# Patient Record
Sex: MALE | Race: OTHER | NOT HISPANIC OR LATINO | Employment: FULL TIME | URBAN - METROPOLITAN AREA
[De-identification: names, ages, dates, MRNs, and addresses within clinical notes are randomized per-mention and may not be internally consistent; named-entity substitution may affect disease eponyms.]

---

## 2008-10-22 LAB — EXTERNAL HIV SCREEN: NORMAL

## 2018-10-29 ENCOUNTER — HOSPITAL ENCOUNTER (EMERGENCY)
Facility: HOSPITAL | Age: 48
Discharge: HOME | End: 2018-10-29
Attending: EMERGENCY MEDICINE | Admitting: EMERGENCY MEDICINE
Payer: OTHER MISCELLANEOUS

## 2018-10-29 ENCOUNTER — APPOINTMENT (EMERGENCY)
Dept: RADIOLOGY | Facility: HOSPITAL | Age: 48
End: 2018-10-29
Payer: OTHER MISCELLANEOUS

## 2018-10-29 VITALS
DIASTOLIC BLOOD PRESSURE: 82 MMHG | TEMPERATURE: 98.8 F | RESPIRATION RATE: 20 BRPM | SYSTOLIC BLOOD PRESSURE: 130 MMHG | HEART RATE: 100 BPM | OXYGEN SATURATION: 97 % | WEIGHT: 185 LBS

## 2018-10-29 DIAGNOSIS — S68.119A: Primary | ICD-10-CM

## 2018-10-29 LAB
ALBUMIN SERPL BCP-MCNC: 3.4 G/DL (ref 3.5–5)
ALP SERPL-CCNC: 91 U/L (ref 46–116)
ALT SERPL W P-5'-P-CCNC: 37 U/L (ref 12–78)
ANION GAP SERPL CALCULATED.3IONS-SCNC: 9 MMOL/L (ref 4–13)
APTT PPP: 28 SECONDS (ref 24–36)
AST SERPL W P-5'-P-CCNC: 18 U/L (ref 5–45)
BASOPHILS # BLD AUTO: 0.04 THOUSANDS/ΜL (ref 0–0.1)
BASOPHILS NFR BLD AUTO: 1 % (ref 0–1)
BILIRUB SERPL-MCNC: 0.4 MG/DL (ref 0.2–1)
BUN SERPL-MCNC: 13 MG/DL (ref 5–25)
CALCIUM SERPL-MCNC: 8.7 MG/DL (ref 8.3–10.1)
CHLORIDE SERPL-SCNC: 103 MMOL/L (ref 100–108)
CO2 SERPL-SCNC: 26 MMOL/L (ref 21–32)
CREAT SERPL-MCNC: 1.03 MG/DL (ref 0.6–1.3)
EOSINOPHIL # BLD AUTO: 0.43 THOUSAND/ΜL (ref 0–0.61)
EOSINOPHIL NFR BLD AUTO: 7 % (ref 0–6)
ERYTHROCYTE [DISTWIDTH] IN BLOOD BY AUTOMATED COUNT: 12.4 % (ref 11.6–15.1)
GFR SERPL CREATININE-BSD FRML MDRD: 86 ML/MIN/1.73SQ M
GLUCOSE SERPL-MCNC: 126 MG/DL (ref 65–140)
HCT VFR BLD AUTO: 47.1 % (ref 36.5–49.3)
HGB BLD-MCNC: 15.4 G/DL (ref 12–17)
IMM GRANULOCYTES # BLD AUTO: 0.01 THOUSAND/UL (ref 0–0.2)
IMM GRANULOCYTES NFR BLD AUTO: 0 % (ref 0–2)
INR PPP: 1 (ref 0.86–1.16)
LYMPHOCYTES # BLD AUTO: 2.45 THOUSANDS/ΜL (ref 0.6–4.47)
LYMPHOCYTES NFR BLD AUTO: 38 % (ref 14–44)
MCH RBC QN AUTO: 29.6 PG (ref 26.8–34.3)
MCHC RBC AUTO-ENTMCNC: 32.7 G/DL (ref 31.4–37.4)
MCV RBC AUTO: 90 FL (ref 82–98)
MONOCYTES # BLD AUTO: 0.42 THOUSAND/ΜL (ref 0.17–1.22)
MONOCYTES NFR BLD AUTO: 7 % (ref 4–12)
NEUTROPHILS # BLD AUTO: 3.05 THOUSANDS/ΜL (ref 1.85–7.62)
NEUTS SEG NFR BLD AUTO: 47 % (ref 43–75)
NRBC BLD AUTO-RTO: 0 /100 WBCS
PLATELET # BLD AUTO: 322 THOUSANDS/UL (ref 149–390)
PMV BLD AUTO: 9.4 FL (ref 8.9–12.7)
POTASSIUM SERPL-SCNC: 3.7 MMOL/L (ref 3.5–5.3)
PROT SERPL-MCNC: 7.5 G/DL (ref 6.4–8.2)
PROTHROMBIN TIME: 10.5 SECONDS (ref 9.4–11.7)
RBC # BLD AUTO: 5.21 MILLION/UL (ref 3.88–5.62)
SODIUM SERPL-SCNC: 138 MMOL/L (ref 136–145)
WBC # BLD AUTO: 6.4 THOUSAND/UL (ref 4.31–10.16)

## 2018-10-29 PROCEDURE — 36415 COLL VENOUS BLD VENIPUNCTURE: CPT | Performed by: EMERGENCY MEDICINE

## 2018-10-29 PROCEDURE — 80053 COMPREHEN METABOLIC PANEL: CPT | Performed by: EMERGENCY MEDICINE

## 2018-10-29 PROCEDURE — 11730 AVULSION NAIL PLATE SIMPLE 1: CPT | Performed by: ORTHOPAEDIC SURGERY

## 2018-10-29 PROCEDURE — 85025 COMPLETE CBC W/AUTO DIFF WBC: CPT | Performed by: EMERGENCY MEDICINE

## 2018-10-29 PROCEDURE — 96375 TX/PRO/DX INJ NEW DRUG ADDON: CPT

## 2018-10-29 PROCEDURE — 99284 EMERGENCY DEPT VISIT MOD MDM: CPT

## 2018-10-29 PROCEDURE — 96365 THER/PROPH/DIAG IV INF INIT: CPT

## 2018-10-29 PROCEDURE — 73140 X-RAY EXAM OF FINGER(S): CPT

## 2018-10-29 PROCEDURE — 99245 OFF/OP CONSLTJ NEW/EST HI 55: CPT | Performed by: ORTHOPAEDIC SURGERY

## 2018-10-29 PROCEDURE — 20822 REPLANTATION DIGIT COMPLETE: CPT | Performed by: ORTHOPAEDIC SURGERY

## 2018-10-29 PROCEDURE — 85610 PROTHROMBIN TIME: CPT | Performed by: EMERGENCY MEDICINE

## 2018-10-29 PROCEDURE — 85730 THROMBOPLASTIN TIME PARTIAL: CPT | Performed by: EMERGENCY MEDICINE

## 2018-10-29 RX ORDER — BUPIVACAINE HYDROCHLORIDE 2.5 MG/ML
10 INJECTION, SOLUTION EPIDURAL; INFILTRATION; INTRACAUDAL ONCE
Status: COMPLETED | OUTPATIENT
Start: 2018-10-29 | End: 2018-10-29

## 2018-10-29 RX ORDER — CEPHALEXIN 500 MG/1
500 CAPSULE ORAL EVERY 8 HOURS SCHEDULED
Qty: 21 CAPSULE | Refills: 0 | Status: SHIPPED | OUTPATIENT
Start: 2018-10-29 | End: 2018-11-05

## 2018-10-29 RX ORDER — MORPHINE SULFATE 4 MG/ML
4 INJECTION, SOLUTION INTRAMUSCULAR; INTRAVENOUS ONCE
Status: COMPLETED | OUTPATIENT
Start: 2018-10-29 | End: 2018-10-29

## 2018-10-29 RX ORDER — OXYCODONE HYDROCHLORIDE AND ACETAMINOPHEN 5; 325 MG/1; MG/1
1 TABLET ORAL EVERY 4 HOURS PRN
Qty: 20 TABLET | Refills: 0 | Status: SHIPPED | OUTPATIENT
Start: 2018-10-29 | End: 2018-11-08

## 2018-10-29 RX ORDER — LIDOCAINE HYDROCHLORIDE 10 MG/ML
10 INJECTION, SOLUTION EPIDURAL; INFILTRATION; INTRACAUDAL; PERINEURAL ONCE
Status: COMPLETED | OUTPATIENT
Start: 2018-10-29 | End: 2018-10-29

## 2018-10-29 RX ADMIN — CEFAZOLIN SODIUM 1000 MG: 1 SOLUTION INTRAVENOUS at 06:38

## 2018-10-29 RX ADMIN — LIDOCAINE HYDROCHLORIDE 10 ML: 10 INJECTION, SOLUTION EPIDURAL; INFILTRATION; INTRACAUDAL; PERINEURAL at 08:20

## 2018-10-29 RX ADMIN — BUPIVACAINE HYDROCHLORIDE 10 ML: 2.5 INJECTION, SOLUTION EPIDURAL; INFILTRATION; INTRACAUDAL; PERINEURAL at 08:20

## 2018-10-29 RX ADMIN — MORPHINE SULFATE 4 MG: 4 INJECTION INTRAVENOUS at 06:34

## 2018-10-29 NOTE — LETTER
[address] [facility] EMERGENCY DEPARTMENT  [address] [facility] [address]  Dept: [phone]    October 29, 2018     Patient: Lashon Davidson   YOB: 1970   Date of Visit: 10/29/2018       To Whom it May Concern:    Lashon Davidson is under my professional care  He was seen in the hospital from 10/29/2018   to 10/29/18  He may return to work with limitations Left handed duty only until cleared by Dr Janey Ackerman  Needs clean environment, office work only until cleared by Dr Janey Ackerman  If you have any questions or concerns, please don't hesitate to call           Sincerely,          Michael Ramirez, DO

## 2018-10-29 NOTE — DISCHARGE INSTRUCTIONS
*Follow up with Dr Colten Drake in the office on Monday 11/5  *Do not remove dressing  *Pain medicine as needed only  *Take Antibiotic pilss for 7 days  *Left handed duty at work until cleared by Dr Malina Dunlap clean environment at work  *Dressing on at all times  *Do not get dressing we  *Call office if any problems in mean time  *This was discussed with the patient and RN work supervisor

## 2018-10-29 NOTE — OP NOTE
OPERATIVE REPORT  PATIENT NAME: Sony Martínez    :  1970  MRN: [id number]    ER procedure dictation        Operative Findings:  Amputation skin RSF   p3 intact  fdp intact    Complications:   None    Procedure and Technique:  Sony presented to the ER today after a finger tip avulsion type injury to the right small finger  He presented to hours after the injury  He brought the finger tip skin with him  This was placed appropriately and a wet gauze and that bag was placed on ice  Emergency room reason have a skin avulsion type amputation of the right small finger  This is the mid P3 level  FDP FDS Ext were intact  The diagnosis was discussed with the patient and his work comp RN supervisor  He was explained to the patient that we can attempt a composite graft and suture the finger tip back onto the finger  He is aware that this may not work and the finger tip may become necrotic  If it does not work he may require more surgery  Further surgery include shortening the finger in which case he may lose DIP function  He understands this and wishes to proceed with composite graft  He is completely aware the risks that this may fail become necrotic  He understands this  He still wishes for the skin back on  In the ER patient underwent sterile prep and drape procedure  The hand was prepped with Betadine  A sterile glove was used as a tourniquet at the base of the small finger  Attention was then turned to the avulsed tip  The nail was removed keeping the nail matrix in place  This was done successfully  The tip was defatted  This was the fat down to dermis  All foreign material was removed and necrotic tissue was debrided  There is no bony remnant within the tip  Attention was then turned to the finger  All foreign bodies were removed  An all necrotic tissue was debrided  The avulsed tip was then placed on the finger that a topical lidocaine    Suturing was then begun with 5060 chromic suture  There was anatomic apposition of the edges  Patient tolerated procedure well  FDP FDS and EXTR were all intact after the surgery  Vaseline gauze was placed over the germinal matrix and sterile gauze was placed over top of this  A little foam splint was then placed over top  Patient tolerated the procedure well  Again he is aware that this may fail he may require revision surgery  He received Ancef in the ER and will be discharged home on Keflex for 7 days  He also be given pain medicine  He will be one-handed duty at work in a clean environment in the office  He will perform this job until he is cleared by myself  Again he is aware that this may not heal and he may need revision surgery       I was present for the entire procedure      SIGNATURE: Gilma Shaver DO  DATE: October 29, 2018  TIME: 2:34 PM

## 2018-10-29 NOTE — ED PROVIDER NOTES
History  Chief Complaint   Patient presents with    Finger Injury     pt caught right 5th finger in a machine at work     52-year-old right-hand dominant male amputated the tip of his right 5th finger in a piece of machinery at work today  Tetanus up-to-date  Bleeding controlled  Exposed distal phalanx  Finger tip salvaged and on ice wrapped in saline soaked gauze  History provided by:  Patient  Hand Injury   Location:  Finger  Finger location:  R little finger  Injury: yes    Time since incident:  30 minutes  Mechanism of injury: amputation    Amputation:     Extent:  Complete    Cause: Industrial machinery    Body part recovered: yes      Reported condition of body part: Intact    Body part discovered within:  30 minutes    Preservation of body part: On ice  Pain details:     Quality:  Dull    Radiates to:  Does not radiate    Severity:  Moderate    Onset quality:  Sudden    Timing:  Constant    Progression:  Unchanged  Handedness:  Right-handed  Dislocation: no    Foreign body present:  No foreign bodies  Tetanus status:  Up to date  Relieved by:  None tried  Worsened by: Movement  Ineffective treatments:  None tried  Associated symptoms: no fever        None       Past Medical History:   Diagnosis Date    High cholesterol        History reviewed  No pertinent surgical history  Family History   Problem Relation Age of Onset    Hypertension Mother      I have reviewed and agree with the history as documented  Social History   Substance Use Topics    Smoking status: Never Smoker    Smokeless tobacco: Not on file    Alcohol use Not on file        Review of Systems   Constitutional: Negative for chills and fever  HENT: Negative  Respiratory: Negative  Cardiovascular: Negative  Gastrointestinal: Negative  Genitourinary: Negative  Musculoskeletal:        Right 5th finger tip amputation   Skin: Negative  Neurological: Negative  Hematological: Negative  Psychiatric/Behavioral: Negative  All other systems reviewed and are negative  Physical Exam  Physical Exam   Constitutional: He is oriented to person, place, and time  He appears well-developed and well-nourished  HENT:   Head: Normocephalic and atraumatic  Right Ear: External ear normal    Left Ear: External ear normal    Nose: Nose normal    Mouth/Throat: Oropharynx is clear and moist    Eyes: EOM are normal    Neck: Normal range of motion  Neck supple  Cardiovascular: Normal rate, regular rhythm, normal heart sounds and intact distal pulses  Pulmonary/Chest: Effort normal and breath sounds normal    Abdominal: Soft  Bowel sounds are normal    Musculoskeletal: Normal range of motion  Amputated right distal finger tip with exposed distal phalanx, bleeding controlled   Neurological: He is alert and oriented to person, place, and time  Skin: Skin is warm and dry  Capillary refill takes less than 2 seconds  Psychiatric: He has a normal mood and affect  His behavior is normal  Judgment and thought content normal    Nursing note and vitals reviewed        Vital Signs  ED Triage Vitals [10/29/18 0615]   Temperature Pulse Respirations Blood Pressure SpO2   98 8 °F (37 1 °C) 100 20 130/82 97 %      Temp Source Heart Rate Source Patient Position - Orthostatic VS BP Location FiO2 (%)   Tympanic -- -- -- --      Pain Score       8           Vitals:    10/29/18 0615   BP: 130/82   Pulse: 100       Visual Acuity      ED Medications  Medications   morphine (PF) 4 mg/mL injection 4 mg (4 mg Intravenous Given 10/29/18 0634)   ceFAZolin (ANCEF) IVPB (premix) 1,000 mg (0 mg Intravenous Stopped 10/29/18 0724)       Diagnostic Studies  Results Reviewed     Procedure Component Value Units Date/Time    Comprehensive metabolic panel [[id number]]  (Abnormal) Collected:  10/29/18 0636    Lab Status:  Final result Specimen:  Blood from Arm, Left Updated:  10/29/18 0706     Sodium 138 mmol/L      Potassium 3 7 mmol/L Chloride 103 mmol/L      CO2 26 mmol/L      ANION GAP 9 mmol/L      BUN 13 mg/dL      Creatinine 1 03 mg/dL      Glucose 126 mg/dL      Calcium 8 7 mg/dL      AST 18 U/L      ALT 37 U/L      Alkaline Phosphatase 91 U/L      Total Protein 7 5 g/dL      Albumin 3 4 (L) g/dL      Total Bilirubin 0 40 mg/dL      eGFR 86 ml/min/1 73sq m     Narrative:         National Kidney Disease Education Program recommendations are as follows:  GFR calculation is accurate only with a steady state creatinine  Chronic Kidney disease less than 60 ml/min/1 73 sq  meters  Kidney failure less than 15 ml/min/1 73 sq  meters  Protime-INR [[id number]]  (Normal) Collected:  10/29/18 0636    Lab Status:  Final result Specimen:  Blood from Arm, Left Updated:  10/29/18 0704     Protime 10 5 seconds      INR 1 00    APTT [[id number]]  (Normal) Collected:  10/29/18 0636    Lab Status:  Final result Specimen:  Blood from Arm, Left Updated:  10/29/18 0704     PTT 28 seconds     CBC and differential [[id number]]  (Abnormal) Collected:  10/29/18 0636    Lab Status:  Final result Specimen:  Blood from Arm, Left Updated:  10/29/18 0646     WBC 6 40 Thousand/uL      RBC 5 21 Million/uL      Hemoglobin 15 4 g/dL      Hematocrit 47 1 %      MCV 90 fL      MCH 29 6 pg      MCHC 32 7 g/dL      RDW 12 4 %      MPV 9 4 fL      Platelets 857 Thousands/uL      nRBC 0 /100 WBCs      Neutrophils Relative 47 %      Immat GRANS % 0 %      Lymphocytes Relative 38 %      Monocytes Relative 7 %      Eosinophils Relative 7 (H) %      Basophils Relative 1 %      Neutrophils Absolute 3 05 Thousands/µL      Immature Grans Absolute 0 01 Thousand/uL      Lymphocytes Absolute 2 45 Thousands/µL      Monocytes Absolute 0 42 Thousand/µL      Eosinophils Absolute 0 43 Thousand/µL      Basophils Absolute 0 04 Thousands/µL                  XR finger fifth digit-pinkie RIGHT   Final Result by Jenny Richmond MD (10/29 0701)      No fracture    Amputation of the soft tissues distally in the pinky finger  Workstation performed: [id number]                    Procedures  Procedures       Phone Contacts  ED Phone Contact    ED Course                               MDM  CritCare Time    Disposition  Final diagnoses:   Traumatic amputation of finger tip - Right 5th     Time reflects when diagnosis was documented in both MDM as applicable and the Disposition within this note     Time User Action Codes Description Comment    10/29/2018  7:09 AM Billy Warner Add [Z66 655Y] Traumatic amputation of finger tip     10/29/2018  7:10 AM Billy Warner Modify [R34 687M] Traumatic amputation of finger tip Right 5th      ED Disposition     None      Follow-up Information    None         Patient's Medications    No medications on file     No discharge procedures on file      ED Provider  Electronically Signed by           Jaquelin Ovalles MD  10/29/18 0471

## 2018-10-29 NOTE — ED NOTES
Pt arrives with glove he was wearing during accident, glove is intact, tip of right 5th finger removed from glove, rinsed with saline and placed in saline dampened gauze and placed on ice   Betadine and saline dampened gauze placed on right 5th fingertip, exposed bone noted,     Luba Rico, RN  10/29/18 [address] [place] [address], RN  10/29/18 2169

## 2018-11-05 VITALS
HEIGHT: 64 IN | WEIGHT: 194.4 LBS | BODY MASS INDEX: 33.19 KG/M2 | DIASTOLIC BLOOD PRESSURE: 73 MMHG | SYSTOLIC BLOOD PRESSURE: 115 MMHG | HEART RATE: 65 BPM

## 2018-11-05 DIAGNOSIS — S68.119D TRAUMATIC AMPUTATION OF FINGERTIP, SUBSEQUENT ENCOUNTER: Primary | ICD-10-CM

## 2018-11-05 PROCEDURE — 99024 POSTOP FOLLOW-UP VISIT: CPT | Performed by: ORTHOPAEDIC SURGERY

## 2018-11-05 NOTE — LETTER
November 5, 2018     Patient: Kimberley Peterson   YOB: 1970   Date of Visit: 11/5/2018       To Whom it May Concern:    Kimberley Peterson is under my professional care  He was seen in my office on 11/5/2018  He may return to work with limitations of left hand duty only  No use of the right hand  He needs a clean environment  Office work only until further notice  If you have any questions or concerns, please don't hesitate to call           Sincerely,          Jane Whitt, DO

## 2018-11-05 NOTE — PROGRESS NOTES
Assessment/Plan:  1  Traumatic amputation of fingertip, subsequent encounter         Scribe Attestation    I,:   Yanna Decker MA am acting as a scribe while in the presence of the attending physician :        I,:   Daisha Carranza, DO personally performed the services described in this documentation    as scribed in my presence :              I discussed with Sony today that on examination he does have some active bleeding at the nail bed  There is some necrosis at the distal 4 mm of the tip of the right small finger which I did discusses with him today may not survive  I discussed with him today that I would like for him to follow up in 1 week for recheck  He was instructed to keep the hand warm and to avoid bumping his right small finger  A work note was provided today stating he can return to work with left hand duty only, no use of the right hand  He was provided another prescription for an additional week of Keflex   He was also provided with a prescription of Asprin 81 mg that he was instructed to take daily  He was instructed if he notes any redness or increased pain to the right small finger he is to call the office and come back in for reevaluation  Sterile dressing was applied today and he will follow up in 1 week for repeat evaluation  Subjective:   Williams Mcnamara is a 53 y o  male who presents to the office today for right small finger injury  On 10/29/18 he was at work when his gloves got stuck in the machine he was working in pulling his hand into the machine  He suffered a complete finger amputation and presented to the ED where this was reattached as a composite graft  Please see op note 10/29/18  He states he has no pain to the right small finger but does feel some pressure  He states he has been complaint with his dressing and finger splint use  Review of Systems   Constitutional: Negative for chills and fever  HENT: Negative for drooling and sneezing      Eyes: Negative for redness  Respiratory: Negative for cough and wheezing  Gastrointestinal: Negative for nausea and vomiting  Musculoskeletal: Negative for arthralgias, joint swelling and myalgias  Neurological: Negative for weakness and numbness  Psychiatric/Behavioral: Negative for behavioral problems  The patient is not nervous/anxious  Past Medical History:   Diagnosis Date    High cholesterol        History reviewed  No pertinent surgical history  Family History   Problem Relation Age of Onset    Hypertension Mother        Social History     Occupational History    Not on file  Social History Main Topics    Smoking status: Never Smoker    Smokeless tobacco: Never Used    Alcohol use Not on file    Drug use: Unknown    Sexual activity: Not on file         Current Outpatient Prescriptions:     cephalexin (KEFLEX) 500 mg capsule, Take 1 capsule (500 mg total) by mouth every 8 (eight) hours for 7 days (Patient not taking: Reported on 11/5/2018 ), Disp: 21 capsule, Rfl: 0    oxyCODONE-acetaminophen (PERCOCET) 5-325 mg per tablet, Take 1 tablet by mouth every 4 (four) hours as needed for moderate pain for up to 10 days Max Daily Amount: 6 tablets (Patient not taking: Reported on 11/5/2018 ), Disp: 20 tablet, Rfl: 0    No Known Allergies    Objective:  Vitals:    11/05/18 0907   BP: 115/73   Pulse: 65       Ortho Exam     Right small Finger    Necrosis noted distal 4 mm of tip small finger  Bleeding noted from nailbed  FDP and FDS intact   Ext intact  Suture line skin appears viable  No edema about the fingertip      Physical Exam   Constitutional: He is oriented to person, place, and time  He appears well-developed and well-nourished  HENT:   Head: Atraumatic  Eyes: Conjunctivae are normal    Neck: Normal range of motion  Cardiovascular: Normal rate  Pulmonary/Chest: Effort normal    Musculoskeletal:   As noted in HPI   Neurological: He is alert and oriented to person, place, and time  Skin: Skin is warm and dry  Psychiatric: He has a normal mood and affect   His behavior is normal  Judgment and thought content normal

## 2018-11-12 VITALS
WEIGHT: 195.4 LBS | DIASTOLIC BLOOD PRESSURE: 71 MMHG | HEIGHT: 64 IN | BODY MASS INDEX: 33.36 KG/M2 | SYSTOLIC BLOOD PRESSURE: 111 MMHG | HEART RATE: 60 BPM

## 2018-11-12 DIAGNOSIS — S68.119D TRAUMATIC AMPUTATION OF FINGERTIP, SUBSEQUENT ENCOUNTER: Primary | ICD-10-CM

## 2018-11-12 PROCEDURE — 99213 OFFICE O/P EST LOW 20 MIN: CPT | Performed by: ORTHOPAEDIC SURGERY

## 2018-11-12 NOTE — PROGRESS NOTES
Assessment/Plan:  1  Traumatic amputation of fingertip, subsequent encounter         Scribe Attestation    I,:   Yanna Decker MA am acting as a scribe while in the presence of the attending physician :        I,:   Janina Martines, DO personally performed the services described in this documentation    as scribed in my presence :              On examination today Sony's necrosis is unchanged from pervious visit he does have active bleeding noted from the nail bed  I discussed with him today that we can continue to watch this closely for 4 weeks total and he may require surgery to shorten his right small finger  At this time, he is to continue with the daily Asprin use  He does not require anymore anti-biotics at this time  A sterile dressing was placed today  He will follow up in 1 weeks for repeat clinical evaluation  He was instructed if he notes any change to the clinical picture or signs of infections to give the office a call  He needs to continue keeping the wound clean and working in a clean environment under light duty restrictions  Subjective:     Yamilet Mccollum is a 53 y o  male who presents to the office today approximately 2 weeks right small finger amputation sustained on 10/29/18  He states he has been compliant with his splint use and anti-biotic use  He denies any fever or chills  He denies any drainage  He denies any pain to the right small finger  Review of Systems   Constitutional: Negative for chills and fever  HENT: Negative for drooling and sneezing  Eyes: Negative for redness  Respiratory: Negative for cough and wheezing  Gastrointestinal: Negative for nausea and vomiting  Musculoskeletal: Negative for arthralgias, joint swelling and myalgias  Neurological: Negative for weakness and numbness  Psychiatric/Behavioral: Negative for behavioral problems  The patient is not nervous/anxious            Past Medical History:   Diagnosis Date    High cholesterol History reviewed  No pertinent surgical history  Family History   Problem Relation Age of Onset    Hypertension Mother        Social History     Occupational History    Not on file  Social History Main Topics    Smoking status: Never Smoker    Smokeless tobacco: Never Used    Alcohol use No    Drug use: No    Sexual activity: Not on file       No current outpatient prescriptions on file  No Known Allergies    Objective:  Vitals:    11/12/18 0854   BP: 111/71   Pulse: 60       Ortho Exam     Right Small Finger     Sluggish cap refill   Necrosis is the same as last appointment  FDP and FDS intact  Extensors intact   No edema about the fingertip  Sensation intact over the tip   Bleeding noted from the nail bed   No puss drainage or abscess noted         Physical Exam   Constitutional: He is oriented to person, place, and time  He appears well-developed and well-nourished  HENT:   Head: Atraumatic  Eyes: Conjunctivae are normal    Neck: Normal range of motion  Cardiovascular: Normal rate  Pulmonary/Chest: Effort normal    Musculoskeletal:   As noted in HPI   Neurological: He is alert and oriented to person, place, and time  Skin: Skin is warm and dry  Psychiatric: He has a normal mood and affect   His behavior is normal  Judgment and thought content normal

## 2018-11-12 NOTE — LETTER
November 12, 2018     Patient: Antoinette Olivarez   YOB: 1970   Date of Visit: 11/12/2018       To Whom it May Concern:    Antoinette Olivarez is under my professional care  He was seen in my office on 11/12/2018  He may return to work with limitations as stated  May continue prior restrictions  Clean area, No weight bearing right hand, light duty   I will see him in the office next Wed      If you have any questions or concerns, please don't hesitate to call           Sincerely,          Shasta Eisenmenger,         CC: No Recipients

## 2018-11-19 ENCOUNTER — OFFICE VISIT (OUTPATIENT)
Dept: OBGYN CLINIC | Facility: CLINIC | Age: 48
End: 2018-11-19

## 2018-11-19 VITALS
HEIGHT: 64 IN | BODY MASS INDEX: 33.84 KG/M2 | SYSTOLIC BLOOD PRESSURE: 119 MMHG | HEART RATE: 69 BPM | DIASTOLIC BLOOD PRESSURE: 78 MMHG | WEIGHT: 198.2 LBS

## 2018-11-19 DIAGNOSIS — S68.119D TRAUMATIC AMPUTATION OF FINGERTIP, SUBSEQUENT ENCOUNTER: Primary | ICD-10-CM

## 2018-11-19 PROCEDURE — 99024 POSTOP FOLLOW-UP VISIT: CPT | Performed by: ORTHOPAEDIC SURGERY

## 2018-11-19 NOTE — PROGRESS NOTES
Assessment/Plan:  1  Traumatic amputation of fingertip, subsequent encounter         Scribe Attestation    I,:   Yanna Decker MA am acting as a scribe while in the presence of the attending physician :        I,:   John Shields DO personally performed the services described in this documentation    as scribed in my presence :              On examination today Sony's necrosis is unchanged  I discussed with him  that we can continue to watch this and treat this conservatively  He was agreeable to this  There is no puss or drainage noted  There is no signs of infection  He will continue on light duty at this time  A sterile dressing was placed today  He was instructed to continue with Aspirin use  He will follow up in 1 week for repeat evaluation  He was instructed if there is any change in his clinical picture or signs of infection to call the office  He will continue to keep the wound clean  He is aware that the nail will not grow back  Subjective:   Megan Craig is a 53 y o  male who presents to the office today approximately 3 weeks right small finger amputation sustained on 10/29/18  He states he has been complaint with splint use  He denies any issue's  He states he has been compliant with Aspirin use  He denies any increased pain  He denies nay drainage  He denies any fever or chills  Review of Systems   Constitutional: Negative for chills and fever  HENT: Negative for drooling and sneezing  Eyes: Negative for redness  Respiratory: Negative for cough and wheezing  Gastrointestinal: Negative for nausea and vomiting  Musculoskeletal: Negative for arthralgias, joint swelling and myalgias  Neurological: Negative for weakness and numbness  Psychiatric/Behavioral: Negative for behavioral problems  The patient is not nervous/anxious  Past Medical History:   Diagnosis Date    High cholesterol        History reviewed  No pertinent surgical history      Family History Problem Relation Age of Onset    Hypertension Mother        Social History     Occupational History    Not on file  Social History Main Topics    Smoking status: Never Smoker    Smokeless tobacco: Never Used    Alcohol use No    Drug use: No    Sexual activity: Not on file       No current outpatient prescriptions on file  No Known Allergies    Objective:  Vitals:    11/19/18 0911   BP: 119/78   Pulse: 69       Ortho Exam    Right Small finger     FDP and FDS intact  Extensors intact  Sensation intact over fingertip  Necrosis is unchanged  No pus expressed her palpable abscess  No erythema about the finger  No edema  Sluggish cap refill present over distal tip  New skin formation noted her distal tip         Physical Exam   Constitutional: He is oriented to person, place, and time  He appears well-developed and well-nourished  HENT:   Head: Atraumatic  Eyes: Conjunctivae are normal    Neck: Normal range of motion  Cardiovascular: Normal rate  Pulmonary/Chest: Effort normal    Musculoskeletal:   As noted in HPI   Neurological: He is alert and oriented to person, place, and time  Skin: Skin is warm and dry  Psychiatric: He has a normal mood and affect   His behavior is normal  Judgment and thought content normal

## 2018-11-19 NOTE — LETTER
November 19, 2018     Patient: Gomez Zuluaga   YOB: 1970   Date of Visit: 11/19/2018       To Whom it May Concern:    Gomez uZluaga is under my professional care  He was seen in my office on 11/19/2018  He He may return to work with limitations as stated  May continue prior restrictions  Clean area, No weight bearing right hand, light duty  He will follow up in 1 week  If you have any questions or concerns, please don't hesitate to call           Sincerely,          Roxie Bob, DO

## 2018-11-26 ENCOUNTER — APPOINTMENT (OUTPATIENT)
Dept: RADIOLOGY | Facility: CLINIC | Age: 48
End: 2018-11-26
Payer: OTHER MISCELLANEOUS

## 2018-11-26 ENCOUNTER — OFFICE VISIT (OUTPATIENT)
Dept: OBGYN CLINIC | Facility: CLINIC | Age: 48
End: 2018-11-26

## 2018-11-26 VITALS
DIASTOLIC BLOOD PRESSURE: 82 MMHG | BODY MASS INDEX: 33.39 KG/M2 | HEIGHT: 64 IN | HEART RATE: 73 BPM | WEIGHT: 195.6 LBS | SYSTOLIC BLOOD PRESSURE: 120 MMHG

## 2018-11-26 DIAGNOSIS — S68.119D TRAUMATIC AMPUTATION OF FINGERTIP, SUBSEQUENT ENCOUNTER: ICD-10-CM

## 2018-11-26 DIAGNOSIS — S68.119D TRAUMATIC AMPUTATION OF FINGERTIP, SUBSEQUENT ENCOUNTER: Primary | ICD-10-CM

## 2018-11-26 PROCEDURE — 99024 POSTOP FOLLOW-UP VISIT: CPT | Performed by: ORTHOPAEDIC SURGERY

## 2018-11-26 PROCEDURE — 73140 X-RAY EXAM OF FINGER(S): CPT

## 2018-11-26 RX ORDER — CEPHALEXIN 500 MG/1
500 CAPSULE ORAL EVERY 8 HOURS SCHEDULED
Qty: 15 CAPSULE | Refills: 0 | Status: SHIPPED | OUTPATIENT
Start: 2018-11-26 | End: 2018-12-01

## 2018-11-26 RX ORDER — CEPHALEXIN 500 MG/1
500 CAPSULE ORAL EVERY 8 HOURS SCHEDULED
Qty: 15 CAPSULE | Refills: 0 | Status: SHIPPED | OUTPATIENT
Start: 2018-11-26 | End: 2018-11-26 | Stop reason: SDUPTHER

## 2018-11-26 NOTE — PROGRESS NOTES
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 not of been  He was instructed to keep his right small finger clean and dry and no lifting with the right hand  He is to remain on light duty at work and a note was provided today stating this  Debridement of the necrotic tissue was performed in the office today and bleeding granulating tissue was presents underneath  A prescription was provided for Keflex as there is a small amount of drainage on examination today  A sterile dressing was applied  A referral was provided for hand therapy for wound care of the right small finger that he should attend at least 2 times a week  He will follow up in 1 week for repeat evaluation  He was advised if there is any changes in his clinical picture or signs of infection to give the office a call  Hopefully at next visit we can further debride any dead tissue and progress with work duty      Subjective:   Samson Valdovinos is a 53 y o  male who presents to the office 4 weeks right small finger amputation that occurred on 10/29/18  He states he has been compliant with splint and Aspirin use  He denies any fever or chills  He denies any drainage  He states 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  He denies any increase in pain  Review of Systems   Constitutional: Negative for chills and fever  HENT: Negative for drooling and sneezing  Eyes: Negative for redness  Respiratory: Negative for cough and wheezing      Gastrointestinal: Negative for nausea and vomiting  Musculoskeletal: Negative for arthralgias, joint swelling and myalgias  Neurological: Negative for weakness and numbness  Psychiatric/Behavioral: Negative for behavioral problems  The patient is not nervous/anxious  Past Medical History:   Diagnosis Date    High cholesterol        History reviewed  No pertinent surgical history  Family History   Problem Relation Age of Onset    Hypertension Mother        Social History     Occupational History    Not on file  Social History Main Topics    Smoking status: Never Smoker    Smokeless tobacco: Never Used    Alcohol use No    Drug use: No    Sexual activity: Not on file       No current outpatient prescriptions on file  No Known Allergies    Objective:  Vitals:    11/26/18 0902   BP: 120/82   Pulse: 73       Ortho Exam     Right Small Finger    Full composite fist   Tip is stable  Tender at suture line  FDP and FDS intact  Extensors intact   Bleeding granulating tissue underneath necrotic tissue after debridement   Small amount of serous drainage noted at the suture line volarly  No redness or swelling about the proximal aspect of finger      Physical Exam   Constitutional: He is oriented to person, place, and time  He appears well-developed and well-nourished  HENT:   Head: Atraumatic  Eyes: Conjunctivae are normal    Neck: Normal range of motion  Cardiovascular: Normal rate  Pulmonary/Chest: Effort normal    Musculoskeletal:   As noted in HPI   Neurological: He is alert and oriented to person, place, and time  Skin: Skin is warm and dry  Psychiatric: He has a normal mood and affect  His behavior is normal  Judgment and thought content normal       I have personally reviewed pertinent films in PACS and my interpretation is as follows: X-ray right small finger performed in the office today demonstrates no signs of infection    No signs of osteomyelitis    Incision and drain  Date/Time: 11/26/2018 9:54 AM  Performed by: Rere Cuevas by: Oscar Thomson     Patient location:  Clinic  Other Assisting Provider: No    Consent:     Consent obtained:  Verbal    Consent given by:  Patient    Risks discussed:  Bleeding and pain    Alternatives discussed:  Observation  Universal protocol:     Procedure explained and questions answered to patient or proxy's satisfaction: yes      Relevant documents present and verified: yes      Test results available and properly labeled: yes      Radiology Images displayed and confirmed  If images not available, report reviewed: yes      Required blood products, implants, devices, and special equipment available: yes      Site/side marked: yes      Immediately prior to procedure a time out was called: yes      Patient identity confirmed:  Verbally with patient  Location:     Type:  Surgical wound infection    Location:  Upper extremity    Upper extremity location:  R small finger  Pre-procedure details:     Skin preparation:  Antiseptic wash  Anesthesia (see MAR for exact dosages): Anesthesia method:  Nerve block    Block needle gauge:  27 G    Block anesthetic:  Lidocaine 1% w/o epi    Block injection procedure:  Anatomic landmarks identified and introduced needle    Block outcome:  Anesthesia achieved  Procedure details:     Complexity:  Simple    Needle aspiration: no      Incision types: Other (comment)    Approach:  Open    Incision depth:  Skin    Wound management:  Probed and deloculated, debrided and irrigated with saline    Drainage:  Bloody and serous    Wound treatment:  Wound left open    Packing materials:  None  Post-procedure details:     Patient tolerance of procedure:   Tolerated well, no immediate complications  Comments:      Debrided dead skin around tip of finger, good bleeding granulation tissue noted

## 2018-11-26 NOTE — LETTER
November 26, 2018     Patient: Megan Craig   YOB: 1970   Date of Visit: 11/26/2018       To Whom it May Concern:    Megan Craig is under my professional care  He was seen in my office on 11/26/2018  Johnie Gomez return to work with limitations as stated  Gray Hortonr continue prior restrictions  Veto [place] area, No weight bearing right hand, light duty  He will follow up in 1 week  If you have any questions or concerns, please don't hesitate to call           Sincerely,          John Shields DO        CC: No Recipients

## 2018-11-28 ENCOUNTER — EVALUATION (OUTPATIENT)
Dept: PHYSICAL THERAPY | Facility: MEDICAL CENTER | Age: 48
End: 2018-11-28
Payer: OTHER MISCELLANEOUS

## 2018-11-28 DIAGNOSIS — S68.119D TRAUMATIC AMPUTATION OF FINGERTIP, SUBSEQUENT ENCOUNTER: Primary | ICD-10-CM

## 2018-11-28 PROCEDURE — 97140 MANUAL THERAPY 1/> REGIONS: CPT

## 2018-11-28 PROCEDURE — G8985 CARRY GOAL STATUS: HCPCS

## 2018-11-28 PROCEDURE — G8984 CARRY CURRENT STATUS: HCPCS

## 2018-11-28 PROCEDURE — 97161 PT EVAL LOW COMPLEX 20 MIN: CPT

## 2018-11-28 NOTE — PROGRESS NOTES
PT Evaluation     Today's date: 2018  Patient name: Williams Mcnamara  : 1970  MRN: [id number]  Referring provider: Hugo Avitia DO  Dx:   Encounter Diagnosis     ICD-10-CM    1  Traumatic amputation of fingertip, subsequent encounter S68 129D Ambulatory referral to PT/OT hand therapy                  Assessment  Assessment details: Pt is a 53year old RHD male who presents to PT following traumatic amputation of the tip of his SF  He has good healing tissue surrounding skin flap, NEI, with no drainage  He has good mobility and ROM of his ulnar digits, with some apprehension to form full composite fist  Redressed wound with xerofrom, cling gauze, and coban to hold in place  He should benefit from skilled PT to help promote full healing of his wound, promote densensitization after wound closure and new skin layer forms, maintain his ROM, restore strength once approved, and improve overall functioning to allow him to RTW   Thank you kindly for your referral    Impairments: abnormal or restricted ROM, activity intolerance, impaired physical strength and pain with function  Understanding of Dx/Px/POC: good   Prognosis: good    Goals  STG (2-3 weeks)  1: full ROM SF  2: Pt able to apply light pressure to pulp of SF without pain  3: Closure of wound, decreased necrotic tissue    LTG (4-6 weeks)  1: Improve FOTO 10-15 pts  2:  strength within 25% of uninvolved side  3: Pt has no sensitivity to pulp of SF  4: pt able to perform all ADL's without pain or limitation    Plan  Plan details: Initiate PT as per POC  Patient would benefit from: skilled physical therapy  Planned therapy interventions: manual therapy, massage, neuromuscular re-education, therapeutic activities, therapeutic exercise, home exercise program, functional ROM exercises and flexibility  Other planned therapy interventions: wound care  Frequency: 2x week  Duration in visits: 12  Duration in weeks: 6  Plan of Care beginning date: 2018  Plan of Care expiration date: 2019  Treatment plan discussed with: patient        Subjective Evaluation    History of Present Illness  Date of onset: 10/29/2018  Mechanism of injury: trauma  Mechanism of injury: Was working with air tube, got hand caught when switch accidentally went to "on"  Went to ER  Saw  last week, cleaned up dead tissue, going back to them next week  SF was a lot more red and swollen than it is now, looks a lot better even after two days  Mild pain when finger gets bumped against something  Denies parasthesias    Pain  Current pain ratin  At best pain ratin  At worst pain rating: 3  Quality: dull ache  Relieving factors: rest  Progression: improved    Social Support    Employment status: working (,  at the [employer])  Hand dominance: right      Diagnostic Tests  X-ray: abnormal  Patient Goals  Patient goals for therapy: decreased pain, increased motion, return to work, independence with ADLs/IADLs and increased strength          Objective     Observations     Right Wrist/Hand   Negative for drainage       Additional Observation Details  Incision clean, dry, no drainage, NEI  necrotic skin flap covering volar and dorsal distal pulp  New skin layer around perimeter of skin flap  + nail bed intact  Minimal edema        Neurological Testing     Sensation     Wrist/Hand     Right   Intact: light touch    Comments   Right light touch: to healthy skin layer on SF    Active Range of Motion     Right Digits   Flexion   Ring     MCP: 70    PIP: 90    DIP: 85  Little     MCP: 70    PIP: 85    DIP: 55  Extension   Ring     MCP: 0    PIP: 0    DIP: 0  Little     MCP: 0    PIP: 0    DIP: 0    Additional Active Range of Motion Details  Apprehension to place distal pulp fully into palm  Pt able to fully abduct/adduct digits    Passive Range of Motion     Right Digits   Flexion   Little     MCP: 90    PIP: 95    DIP: 75    Strength/Myotome Testing     Additional Strength Details  Incision clean, dry, no drainage, NEI  necrotic skin flap covering volar and dorsal distal pulp  New skin layer around perimeter of skin flap  + nail bed intact  Minimal edema              Precautions: no use of R hand    Daily Treatment Diary     Manual              Dressing change             TGE's/blocking                                                        Exercise Diary              Wrist AROM             Wooden pegs grasping                                                                                                                                                                                                                                                           Modalities                                                       HEP: TGE's and blocking for SF

## 2018-11-30 ENCOUNTER — OFFICE VISIT (OUTPATIENT)
Dept: PHYSICAL THERAPY | Facility: MEDICAL CENTER | Age: 48
End: 2018-11-30
Payer: OTHER MISCELLANEOUS

## 2018-11-30 DIAGNOSIS — S68.119D TRAUMATIC AMPUTATION OF FINGERTIP, SUBSEQUENT ENCOUNTER: Primary | ICD-10-CM

## 2018-11-30 PROCEDURE — 97110 THERAPEUTIC EXERCISES: CPT

## 2018-11-30 PROCEDURE — 97140 MANUAL THERAPY 1/> REGIONS: CPT

## 2018-11-30 NOTE — PROGRESS NOTES
Daily Note     Today's date: 2018  Patient name: Durga Swenson  : 1970  MRN: [id number]  Referring provider: Juan Pablo Chambers DO  Dx: No diagnosis found  Subjective: Pt reports he has been good not using R hand, dressing may have been too tight with coban  Objective: See treatment diary below      Assessment: Tolerated treatment well  Patient exhibited good technique with therapeutic exercises and would benefit from continued PT previous dressing was too tight on SF, decreased blood flow to dorsum of SF, good cap refill along volar surface  Redressed SF with gauze and tubular sleeve instead of coban to keep dressings looser  Perimeter surrounding skin flap had minor amt of slough surrounding dorsal border, gauze dressing had dried drainage, no present drainage  RTD Mon 12/3/18  Plan: Continue per plan of care     Precautions: no use of R hand    Daily Treatment Diary     Manual              Dressing change 10            TGE's/blocking 3                                       13                Exercise Diary              Wrist AROM 2x10 with wooden peg            Wooden pegs grasping 2 rounds            Groove pegs > 1 at time 1 board                                                                                                                                                                                                                             10                Modalities                                                       HEP: TGE's and blocking for SF

## 2018-12-03 ENCOUNTER — OFFICE VISIT (OUTPATIENT)
Dept: OBGYN CLINIC | Facility: CLINIC | Age: 48
End: 2018-12-03

## 2018-12-03 ENCOUNTER — OFFICE VISIT (OUTPATIENT)
Dept: PHYSICAL THERAPY | Facility: MEDICAL CENTER | Age: 48
End: 2018-12-03
Payer: OTHER MISCELLANEOUS

## 2018-12-03 VITALS
HEIGHT: 64 IN | DIASTOLIC BLOOD PRESSURE: 83 MMHG | SYSTOLIC BLOOD PRESSURE: 122 MMHG | BODY MASS INDEX: 33.29 KG/M2 | HEART RATE: 98 BPM | WEIGHT: 195 LBS

## 2018-12-03 DIAGNOSIS — S68.119D TRAUMATIC AMPUTATION OF FINGERTIP, SUBSEQUENT ENCOUNTER: Primary | ICD-10-CM

## 2018-12-03 PROCEDURE — 99024 POSTOP FOLLOW-UP VISIT: CPT | Performed by: ORTHOPAEDIC SURGERY

## 2018-12-03 PROCEDURE — 97110 THERAPEUTIC EXERCISES: CPT

## 2018-12-03 PROCEDURE — 97112 NEUROMUSCULAR REEDUCATION: CPT

## 2018-12-03 NOTE — LETTER
December 3, 2018     Patient: Katlyn Vasquez   YOB: 1970   Date of Visit: 12/3/2018       To Whom it May Concern:    Katlyn Vasquez is under my professional care  He was seen in my office on 12/3/2018  He may return to work on full duty with no restrictions on 12/4/18  Keep right small finger clean and dry  He must attend OT 2 times a week for wound care  If you have any questions or concerns, please don't hesitate to call           Sincerely,          Lane López DO        CC: No Recipients

## 2018-12-03 NOTE — PROGRESS NOTES
Assessment/Plan:  1  Traumatic amputation of fingertip, subsequent encounter         Scribe Attestation    I,:   Yanna Decker MA am acting as a scribe while in the presence of the attending physician :        I,:   Carrie Coelho DO personally performed the services described in this documentation    as scribed in my presence :              Debridement of the necrotic tissue was performed in the office today  Granulating tissue at the tip underneath necrotic tissue was noted after debridement  He was instructed to continue with OT for wound care 2 times a week  He was instructed to not change the dressings at home to allow wound care to do this for him  At this time he may return to work full duty with no restrictions and a note was provided stating this today  He was instructed to keep the finger clean and dry  A sterile dressing was applied in the office today  He will follow up in 2 weeks for repeat evaluation  Subjective:   Augie Ortega is a 53 y o  male who presents presents to the office today 5 weeks right small finger amputation that occurred on 10/29/18  He states he has been compliant with Keflex use and this was finished yesterday  He denies any drainage  He denies any fever or chills  He has been attending OT for wound care  Review of Systems   Constitutional: Negative for chills and fever  HENT: Negative for drooling and sneezing  Eyes: Negative for redness  Respiratory: Negative for cough and wheezing  Gastrointestinal: Negative for nausea and vomiting  Musculoskeletal: Negative for arthralgias, joint swelling and myalgias  Neurological: Negative for weakness and numbness  Psychiatric/Behavioral: Negative for behavioral problems  The patient is not nervous/anxious  Past Medical History:   Diagnosis Date    High cholesterol        History reviewed  No pertinent surgical history      Family History   Problem Relation Age of Onset    Hypertension Mother Social History     Occupational History    Not on file  Social History Main Topics    Smoking status: Never Smoker    Smokeless tobacco: Never Used    Alcohol use No    Drug use: No    Sexual activity: Not on file       No current outpatient prescriptions on file  No Known Allergies    Objective:  Vitals:    12/03/18 0906   BP: 122/83   Pulse: 98       Ortho Exam     Right Small Finger    Full composite fist  Tip is stable  FDP and FDS intact  Bleeding granulating tissue underneath tip of necrotic tissue after debridement    Small amount of black eschar   Compartments soft  Sensation intact median, radial, and ulnar nerve distribution     Physical Exam   Constitutional: He is oriented to person, place, and time  He appears well-developed and well-nourished  HENT:   Head: Atraumatic  Eyes: Conjunctivae are normal    Neck: Normal range of motion  Cardiovascular: Normal rate  Pulmonary/Chest: Effort normal    Musculoskeletal:   As noted in HPI   Neurological: He is alert and oriented to person, place, and time  Skin: Skin is warm and dry  Psychiatric: He has a normal mood and affect   His behavior is normal  Judgment and thought content normal

## 2018-12-03 NOTE — PROGRESS NOTES
Daily Note     Today's date: 12/3/2018  Patient name: Kimberley Peterson  : 1970  MRN: [id number]  Referring provider: Maegan Santamaria DO  Dx:   Encounter Diagnosis     ICD-10-CM    1  Traumatic amputation of fingertip, subsequent encounter S68 129D                   Subjective: Cleared to go back to work tomorrow  Doctor debrided finger more, was bleeding, couldn't get all black tissue off  Objective: See treatment diary below      Assessment: Tolerated treatment well  Patient exhibited good technique with therapeutic exercises and would benefit from continued PT  Redressed wound, dry drainage, eschar decreasing n surface area, granulation tissue dried, applied xeroform to keep wound moist and tubular gauze  Add light strengthening exercises to program  No c/o after session  Plan: Continue per plan of care     Precautions: no use of R hand    Daily Treatment Diary     Manual  11/30 12/3           Dressing change 10 5           TGE's/blocking 3                                       13                Exercise Diary  11/30 12/3           Wrist AROM 2x10 with wooden peg 3# 2x10           Wooden pegs grasping 2 rounds NP           Groove pegs > 1 at time 1 board 1 board           rubberband finger spread             Hand helper  1 blue/ 1 green 30x           flexbar towel twist  Red 20x           flexbar sup/pron  Red 20x           o'daniella  3 min           digiflex  Green puppet hand 20x                                                                                                                                              10 10/10               Modalities                                                       HEP: TGE's and blocking for [facility]

## 2018-12-05 ENCOUNTER — OFFICE VISIT (OUTPATIENT)
Dept: PHYSICAL THERAPY | Facility: MEDICAL CENTER | Age: 48
End: 2018-12-05
Payer: OTHER MISCELLANEOUS

## 2018-12-05 DIAGNOSIS — S68.119D TRAUMATIC AMPUTATION OF FINGERTIP, SUBSEQUENT ENCOUNTER: Primary | ICD-10-CM

## 2018-12-05 PROCEDURE — 97110 THERAPEUTIC EXERCISES: CPT

## 2018-12-05 PROCEDURE — 97530 THERAPEUTIC ACTIVITIES: CPT

## 2018-12-05 NOTE — PROGRESS NOTES
Daily Note     Today's date: 2018  Patient name: Augie Ortega  : 1970  MRN: [id number]  Referring provider: Ankit Mason DO  Dx:   Encounter Diagnosis     ICD-10-CM    1  Traumatic amputation of fingertip, subsequent encounter S68 129D                   Subjective: Pt reports he has been feeling good, no c/o pain  Nurse at work has been redressing his wound      Objective: See treatment diary below      Assessment: Tolerated treatment well  Patient exhibited good technique with therapeutic exercises Increase resistance to Pt's program     II R/L 85/105  Wound has good granulation tissue intact, no drainage  Black eschar decreasing in size  Possibly 2 more visits then D/C to home program    Plan: Continue per plan of care     Precautions: none    Daily Treatment Diary     Manual  11/30 12/3 12/5          Dressing change 10 5 5          TGE's/blocking 3                                       13                Exercise Diary  11/30 12/3 12/5          Wrist AROM 2x10 with wooden peg 3# 2x10 4# 2x10          Wooden pegs grasping 2 rounds NP           Groove pegs > 1 at time 1 board 1 board 1 board          rubberband finger spread             Hand helper  1 blue/ 1 green 30x 1 blue 30x          flexbar towel twist  Red 20x Green 30x          flexbar sup/pron  Red 20x Green 30x          o'daniella  3 min 3 min          digiflex  Green puppet hand 20x Green puppet hand 30x                                                                                                                                             10 10/10 10/10              Modalities                                                       HEP: TGE's and blocking for [facility]

## 2018-12-10 ENCOUNTER — OFFICE VISIT (OUTPATIENT)
Dept: PHYSICAL THERAPY | Facility: MEDICAL CENTER | Age: 48
End: 2018-12-10
Payer: OTHER MISCELLANEOUS

## 2018-12-10 DIAGNOSIS — S68.119D TRAUMATIC AMPUTATION OF FINGERTIP, SUBSEQUENT ENCOUNTER: Primary | ICD-10-CM

## 2018-12-10 PROCEDURE — 97530 THERAPEUTIC ACTIVITIES: CPT

## 2018-12-10 PROCEDURE — 97110 THERAPEUTIC EXERCISES: CPT

## 2018-12-10 NOTE — PROGRESS NOTES
Daily Note     Today's date: 12/10/2018  Patient name: Yane Gallegos  : 1970  MRN: [id number]  Referring provider: Isha Parikh DO  Dx:   Encounter Diagnosis     ICD-10-CM    1  Traumatic amputation of fingertip, subsequent encounter S68 129D                   Subjective: Pt reported he got his hand wet, currently wearing bandaid      Objective: See treatment diary below      Assessment: Tolerated treatment well  Patient exhibited good technique with therapeutic exercises and would benefit from continued PT skin surrounding open areas looked macerated due to increased moisture  Black eschar decreasing in size only covering about 50% of pulp now  No drainage  Redressed with xeroform and tubular gauze Pt tolerated exercises well  Possible D/C NV  Plan: Continue per plan of care     Precautions: none    Daily Treatment Diary     Manual  11/30 12/3 12/5 12/10         Dressing change 10 5 5 5         TGE's/blocking 3                                       13                Exercise Diary  11/30 12/3 12/5 12/10         Wrist AROM 2x10 with wooden peg 3# 2x10 4# 2x10 4# 2x10 each         Wooden pegs grasping 2 rounds NP           Groove pegs > 1 at time 1 board 1 board 1 board 1 board         rubberband finger spread             Hand helper  1 blue/ 1 green 30x 2 blue 30x 2 blue 30x         flexbar towel twist  Red 20x Green 30x Green 30x         flexbar sup/pron  Red 20x Green 30x [id number]         o'daniella  3 min 3 min 3 min         digiflex  Green puppet hand 20x Green puppet hand 30x Green puppet hand                                                                                                                                             10 10/10 10/10 10/10             Modalities                                                       HEP: TGE's and blocking for [facility]

## 2018-12-12 ENCOUNTER — OFFICE VISIT (OUTPATIENT)
Dept: PHYSICAL THERAPY | Facility: MEDICAL CENTER | Age: 48
End: 2018-12-12
Payer: OTHER MISCELLANEOUS

## 2018-12-12 DIAGNOSIS — S68.119D TRAUMATIC AMPUTATION OF FINGERTIP, SUBSEQUENT ENCOUNTER: Primary | ICD-10-CM

## 2018-12-12 PROCEDURE — 97140 MANUAL THERAPY 1/> REGIONS: CPT

## 2018-12-12 PROCEDURE — G8986 CARRY D/C STATUS: HCPCS

## 2018-12-12 PROCEDURE — G8985 CARRY GOAL STATUS: HCPCS

## 2018-12-12 NOTE — PROGRESS NOTES
Daily Note/Discharge    Today's date: 2018  Patient name: Adonis Mantilla  : 1970  MRN: [id number]  Referring provider: David Herrera DO  Dx:   Encounter Diagnosis     ICD-10-CM    1  Traumatic amputation of fingertip, subsequent encounter S68 129D                   Subjective: Pt reports his wound has closed, only wearing dressing when at work  Objective: See treatment diary below      Assessment: Tolerated treatment well  Patient exhibited good technique with therapeutic exercises Wound dry, closed, eschar decreased in size  Black eschar measuring 1 2 cm length; 0 6 cm width- new epithelial layer forming underneath perimeter of eschar- likely to continue to decrease surface area of dead tissue  A SF  MCP 75  PIP 85  DIP 80   II R/L 90/100 lbs  Mild sensitivity to tip of SF- advised Pt to try different desensitization activities- towel bunching, heated rice  Advised pt to keep area covered when at work to make sure there isn't any soft tissue damage to healing structures  Pt does not require continued PT, he has adequate ROM, strength and wound healing doesn't require any further intervention  D/C to HEP at this time  STG (2-3 weeks)  1: full ROM SF- partially met  2: Pt able to apply light pressure to pulp of SF without pain- partially met  3: Closure of wound, decreased necrotic tissue- met    LTG (4-6 weeks)  1: Improve FOTO 10-15 pts- met  2:  strength within 25% of uninvolved side- met  3: Pt has no sensitivity to pulp of SF- partially met  4: pt able to perform all ADL's without pain or limitation- met    Plan: Continue per plan of care       Daily Treatment Diary     Manual  11/30 12/3 12/5 12/10 12/12        Dressing change 10 5 5 5 measurements 15        TGE's/blocking 3                                       13    15            Exercise Diary  11/30 12/3 12/5 12/10         Wrist AROM 2x10 with wooden peg 3# 2x10 4# 2x10 4# 2x10 each         Wooden pegs grasping 2 rounds NP Groove pegs > 1 at time 1 board 1 board 1 board 1 board         rubberband finger spread             Hand helper  1 blue/ 1 green 30x 2 blue 30x 2 blue 30x         flexbar towel twist  Red 20x Green 30x Green 30x         flexbar sup/pron  Red 20x Green 30x [id number]         o'daniella  3 min 3 min 3 min         digiflex  Green puppet hand 20x Green puppet hand 30x Green puppet hand                                                                                                                                             10 10/10 10/10 10/10             Modalities                                                       HEP: TGE's and blocking for SF, heated rice, towel bunches

## 2018-12-17 ENCOUNTER — OFFICE VISIT (OUTPATIENT)
Dept: OBGYN CLINIC | Facility: CLINIC | Age: 48
End: 2018-12-17

## 2018-12-17 VITALS
HEART RATE: 73 BPM | DIASTOLIC BLOOD PRESSURE: 74 MMHG | HEIGHT: 64 IN | WEIGHT: 180 LBS | SYSTOLIC BLOOD PRESSURE: 110 MMHG | BODY MASS INDEX: 30.73 KG/M2

## 2018-12-17 DIAGNOSIS — S68.119D TRAUMATIC AMPUTATION OF FINGERTIP, SUBSEQUENT ENCOUNTER: Primary | ICD-10-CM

## 2018-12-17 PROCEDURE — 99024 POSTOP FOLLOW-UP VISIT: CPT | Performed by: ORTHOPAEDIC SURGERY

## 2018-12-17 NOTE — PROGRESS NOTES
Assessment/Plan:  1  Traumatic amputation of fingertip, subsequent encounter         Scribe Attestation    I,:   Yanna Decker MA am acting as a scribe while in the presence of the attending physician :        I,:   Rach Swift, DO personally performed the services described in this documentation    as scribed in my presence :              Elie Player is doing well  He has a 1 cm scab over the tip without drainage  He has finished with wound care and PT  He has full DIP ROM on examination today  He was instructed to keep this covered while at work  He has no restrictions at work  Feel the need to do a worker's keep it covered  At this point he has full range of motion I am only concern about infection at this time  He will follow up in 4 weeks for repeat evaluation  Subjective:   Patsy Thornton is a 53 y o  male who presents to the office 7 weeks right small finger amputation that occurred in 10/29/18  Patient states he has been compliant with wound care  He states he I doing well  He denies any fever or chills  He denies any pain  Review of Systems   Constitutional: Negative for chills and fever  HENT: Negative for drooling and sneezing  Eyes: Negative for redness  Respiratory: Negative for cough and wheezing  Gastrointestinal: Negative for nausea and vomiting  Musculoskeletal: Negative for arthralgias, joint swelling and myalgias  Neurological: Negative for weakness and numbness  Psychiatric/Behavioral: Negative for behavioral problems  The patient is not nervous/anxious  Past Medical History:   Diagnosis Date    High cholesterol        History reviewed  No pertinent surgical history  Family History   Problem Relation Age of Onset    Hypertension Mother        Social History     Occupational History    Not on file       Social History Main Topics    Smoking status: Never Smoker    Smokeless tobacco: Never Used    Alcohol use No    Drug use: No    Sexual activity: Not on file       No current outpatient prescriptions on file  No Known Allergies    Objective:  Vitals:    12/17/18 0918   BP: 110/74   Pulse: 73       Ortho Exam     Right Small Finger     Able to make full composite fist  Compartments soft  Sensation intact median, radial and ulnar nerve  Full DIP ROM  FDP and FDS intact  1 cm scab over tip  No drainage   Ext intact      Physical Exam   Constitutional: He is oriented to person, place, and time  He appears well-developed and well-nourished  HENT:   Head: Normocephalic and atraumatic  Eyes: Conjunctivae are normal  Right eye exhibits no discharge  Left eye exhibits no discharge  Neck: Normal range of motion  Neck supple  Cardiovascular: Normal rate and intact distal pulses  Pulmonary/Chest: Effort normal  No respiratory distress  Musculoskeletal:   As noted in HPI   Neurological: He is alert and oriented to person, place, and time  Skin: Skin is warm and dry  Psychiatric: He has a normal mood and affect   His behavior is normal  Judgment and thought content normal

## 2019-01-14 ENCOUNTER — OFFICE VISIT (OUTPATIENT)
Dept: OBGYN CLINIC | Facility: CLINIC | Age: 49
End: 2019-01-14

## 2019-01-14 VITALS
SYSTOLIC BLOOD PRESSURE: 110 MMHG | BODY MASS INDEX: 30.73 KG/M2 | HEIGHT: 64 IN | DIASTOLIC BLOOD PRESSURE: 72 MMHG | WEIGHT: 180 LBS

## 2019-01-14 DIAGNOSIS — S68.119D TRAUMATIC AMPUTATION OF FINGERTIP, SUBSEQUENT ENCOUNTER: Primary | ICD-10-CM

## 2019-01-14 PROCEDURE — 99024 POSTOP FOLLOW-UP VISIT: CPT | Performed by: ORTHOPAEDIC SURGERY

## 2019-01-14 NOTE — PROGRESS NOTES
Assessment/Plan:  1  Traumatic amputation of fingertip, subsequent encounter         Scribe Attestation    I,:   Yanna Decker MA am acting as a scribe while in the presence of the attending physician :        I,:   Sophy Hylton DO personally performed the services described in this documentation    as scribed in my presence :              Libertad Lang is doing well  He has a healing scab and the necrotic area is gone  He has full ROM on examoination today and is non-tender to palpation about the finger  He has no restrictions at this time and may follow up with me as needed  Subjective:   Katie Moss is a 53 y o  male who presents to the office 11 weeks right small finger amputation that occurred on 10/29/18  He states he is doing well  He denies any pain  He denies any fever or chills  Review of Systems   Constitutional: Negative for chills and fever  HENT: Negative for drooling and sneezing  Eyes: Negative for redness  Respiratory: Negative for cough and wheezing  Gastrointestinal: Negative for nausea and vomiting  Musculoskeletal: Negative for arthralgias, joint swelling and myalgias  Neurological: Negative for weakness and numbness  Psychiatric/Behavioral: Negative for behavioral problems  The patient is not nervous/anxious  Past Medical History:   Diagnosis Date    High cholesterol        No past surgical history on file  Family History   Problem Relation Age of Onset    Hypertension Mother        Social History     Occupational History    Not on file  Social History Main Topics    Smoking status: Never Smoker    Smokeless tobacco: Never Used    Alcohol use No    Drug use: No    Sexual activity: Not on file       No current outpatient prescriptions on file  No Known Allergies    Objective: There were no vitals filed for this visit      Ortho Exam     Right small finger    Necrotic area gone  healing scab  FDS FDP intact   nail growth noted  NTTP about the finger  Sensation intact median, radial, and ulnar nerve  Compartments soft         Physical Exam   Constitutional: He is oriented to person, place, and time  He appears well-developed and well-nourished  HENT:   Head: Normocephalic and atraumatic  Eyes: Conjunctivae are normal  Right eye exhibits no discharge  Left eye exhibits no discharge  Neck: Normal range of motion  Neck supple  Cardiovascular: Normal rate and intact distal pulses  Pulmonary/Chest: Effort normal  No respiratory distress  Neurological: He is alert and oriented to person, place, and time  Skin: Skin is warm and dry  Psychiatric: He has a normal mood and affect   His behavior is normal  Judgment and thought content normal

## 2019-07-29 ENCOUNTER — HOSPITAL ENCOUNTER (OUTPATIENT)
Dept: RADIOLOGY | Facility: HOSPITAL | Age: 49
Discharge: HOME | End: 2019-07-29
Payer: COMMERCIAL

## 2019-07-29 ENCOUNTER — APPOINTMENT (OUTPATIENT)
Dept: LAB | Facility: HOSPITAL | Age: 49
End: 2019-07-29
Payer: COMMERCIAL

## 2019-07-29 ENCOUNTER — OFFICE VISIT (OUTPATIENT)
Dept: FAMILY MEDICINE CLINIC | Facility: CLINIC | Age: 49
End: 2019-07-29
Payer: COMMERCIAL

## 2019-07-29 VITALS
HEART RATE: 83 BPM | BODY MASS INDEX: 34.01 KG/M2 | WEIGHT: 199.2 LBS | OXYGEN SATURATION: 96 % | DIASTOLIC BLOOD PRESSURE: 80 MMHG | SYSTOLIC BLOOD PRESSURE: 110 MMHG | HEIGHT: 64 IN

## 2019-07-29 DIAGNOSIS — L91.0 KELOID OF SKIN: ICD-10-CM

## 2019-07-29 DIAGNOSIS — Z77.098 SUSPECTED EXPOSURE TO HAZARDOUS CHEMICAL: ICD-10-CM

## 2019-07-29 DIAGNOSIS — Z13.220 SCREENING FOR CHOLESTEROL LEVEL: ICD-10-CM

## 2019-07-29 DIAGNOSIS — Z00.01 ENCOUNTER FOR WELL ADULT EXAM WITH ABNORMAL FINDINGS: ICD-10-CM

## 2019-07-29 DIAGNOSIS — E78.2 MIXED HYPERLIPIDEMIA: ICD-10-CM

## 2019-07-29 DIAGNOSIS — E66.9 OBESITY (BMI 30.0-34.9): ICD-10-CM

## 2019-07-29 DIAGNOSIS — Z00.01 ENCOUNTER FOR WELL ADULT EXAM WITH ABNORMAL FINDINGS: Primary | ICD-10-CM

## 2019-07-29 DIAGNOSIS — R73.9 HYPERGLYCEMIA: ICD-10-CM

## 2019-07-29 PROBLEM — E66.811 OBESITY (BMI 30.0-34.9): Status: ACTIVE | Noted: 2019-07-29

## 2019-07-29 LAB
ALBUMIN SERPL BCP-MCNC: 4.3 G/DL (ref 3–5.2)
ALP SERPL-CCNC: 84 U/L (ref 43–122)
ALT SERPL W P-5'-P-CCNC: 42 U/L (ref 9–52)
ANION GAP SERPL CALCULATED.3IONS-SCNC: 8 MMOL/L (ref 5–14)
AST SERPL W P-5'-P-CCNC: 28 U/L (ref 17–59)
BASOPHILS # BLD AUTO: 0.1 THOUSANDS/ΜL (ref 0–0.1)
BASOPHILS NFR BLD AUTO: 1 % (ref 0–1)
BILIRUB SERPL-MCNC: 0.5 MG/DL
BUN SERPL-MCNC: 12 MG/DL (ref 5–25)
CALCIUM SERPL-MCNC: 9.3 MG/DL (ref 8.4–10.2)
CHLORIDE SERPL-SCNC: 102 MMOL/L (ref 97–108)
CHOLEST SERPL-MCNC: 267 MG/DL
CO2 SERPL-SCNC: 28 MMOL/L (ref 22–30)
CREAT SERPL-MCNC: 0.91 MG/DL (ref 0.7–1.5)
EOSINOPHIL # BLD AUTO: 0.4 THOUSAND/ΜL (ref 0–0.4)
EOSINOPHIL NFR BLD AUTO: 8 % (ref 0–6)
ERYTHROCYTE [DISTWIDTH] IN BLOOD BY AUTOMATED COUNT: 14 %
EST. AVERAGE GLUCOSE BLD GHB EST-MCNC: 131 MG/DL
GFR SERPL CREATININE-BSD FRML MDRD: 99 ML/MIN/1.73SQ M
GLUCOSE P FAST SERPL-MCNC: 105 MG/DL (ref 70–99)
HBA1C MFR BLD: 6.2 % (ref 4.2–6.3)
HCT VFR BLD AUTO: 46.7 % (ref 41–53)
HDLC SERPL-MCNC: 42 MG/DL (ref 40–59)
HGB BLD-MCNC: 15.4 G/DL (ref 13.5–17.5)
LDLC SERPL CALC-MCNC: 190 MG/DL
LYMPHOCYTES # BLD AUTO: 2.2 THOUSANDS/ΜL (ref 0.5–4)
LYMPHOCYTES NFR BLD AUTO: 42 % (ref 25–45)
MCH RBC QN AUTO: 29.7 PG (ref 26–34)
MCHC RBC AUTO-ENTMCNC: 33 G/DL (ref 31–36)
MCV RBC AUTO: 90 FL (ref 80–100)
MONOCYTES # BLD AUTO: 0.3 THOUSAND/ΜL (ref 0.2–0.9)
MONOCYTES NFR BLD AUTO: 6 % (ref 1–10)
NEUTROPHILS # BLD AUTO: 2.2 THOUSANDS/ΜL (ref 1.8–7.8)
NEUTS SEG NFR BLD AUTO: 43 % (ref 45–65)
NONHDLC SERPL-MCNC: 225 MG/DL
PLATELET # BLD AUTO: 370 THOUSANDS/UL (ref 150–450)
PMV BLD AUTO: 8.1 FL (ref 8.9–12.7)
POTASSIUM SERPL-SCNC: 4.1 MMOL/L (ref 3.6–5)
PROT SERPL-MCNC: 7.8 G/DL (ref 5.9–8.4)
RBC # BLD AUTO: 5.18 MILLION/UL (ref 4.5–5.9)
SODIUM SERPL-SCNC: 138 MMOL/L (ref 137–147)
TRIGL SERPL-MCNC: 174 MG/DL
TSH SERPL DL<=0.05 MIU/L-ACNC: 1.81 UIU/ML (ref 0.47–4.68)
WBC # BLD AUTO: 5.2 THOUSAND/UL (ref 4.5–11)

## 2019-07-29 PROCEDURE — 71046 X-RAY EXAM CHEST 2 VIEWS: CPT

## 2019-07-29 PROCEDURE — 83036 HEMOGLOBIN GLYCOSYLATED A1C: CPT

## 2019-07-29 PROCEDURE — 84443 ASSAY THYROID STIM HORMONE: CPT

## 2019-07-29 PROCEDURE — 80061 LIPID PANEL: CPT

## 2019-07-29 PROCEDURE — 93000 ELECTROCARDIOGRAM COMPLETE: CPT | Performed by: NURSE PRACTITIONER

## 2019-07-29 PROCEDURE — 36415 COLL VENOUS BLD VENIPUNCTURE: CPT

## 2019-07-29 PROCEDURE — 85025 COMPLETE CBC W/AUTO DIFF WBC: CPT

## 2019-07-29 PROCEDURE — 80053 COMPREHEN METABOLIC PANEL: CPT

## 2019-07-29 PROCEDURE — 99396 PREV VISIT EST AGE 40-64: CPT | Performed by: NURSE PRACTITIONER

## 2019-07-29 NOTE — ASSESSMENT & PLAN NOTE
-Referral given to General surgery for possible removal of keloid formation on upper back of thorax

## 2019-07-29 NOTE — PATIENT INSTRUCTIONS
Obesity   AMBULATORY CARE:   Obesity  is when your body mass index (BMI) is greater than 30  Your healthcare provider will use your height and weight to measure your BMI  The risks of obesity include  many health problems, such as injuries or physical disability  You may need tests to check for the following:  · Diabetes     · High blood pressure or high cholesterol     · Heart disease     · Gallbladder or liver disease     · Cancer of the colon, breast, prostate, liver, or kidney     · Sleep apnea     · Arthritis or gout  Seek care immediately if:   · You have a severe headache, confusion, or difficulty speaking  · You have weakness on one side of your body  · You have chest pain, sweating, or shortness of breath  Contact your healthcare provider if:   · You have symptoms of gallbladder or liver disease, such as pain in your upper abdomen  · You have knee or hip pain and discomfort while walking  · You have symptoms of diabetes, such as intense hunger and thirst, and frequent urination  · You have symptoms of sleep apnea, such as snoring or daytime sleepiness  · You have questions or concerns about your condition or care  Treatment for obesity  focuses on helping you lose weight to improve your health  Even a small decrease in BMI can reduce the risk for many health problems  Your healthcare provider will help you set a weight-loss goal   · Lifestyle changes  are the first step in treating obesity  These include making healthy food choices and getting regular physical activity  Your healthcare provider may suggest a weight-loss program that involves coaching, education, and therapy  · Medicine  may help you lose weight when it is used with a healthy diet and physical activity  · Surgery  can help you lose weight if you are very obese and have other health problems  There are several types of weight-loss surgery  Ask your healthcare provider for more information    Be successful losing weight:   · Set small, realistic goals  An example of a small goal is to walk for 20 minutes 5 days a week  Anther goal is to lose 5% of your body weight  · Tell friends, family members, and coworkers about your goals  and ask for their support  Ask a friend to lose weight with you, or join a weight-loss support group  · Identify foods or triggers that may cause you to overeat , and find ways to avoid them  Remove tempting high-calorie foods from your home and workplace  Place a bowl of fresh fruit on your kitchen counter  If stress causes you to eat, then find other ways to cope with stress  · Keep a diary to track what you eat and drink  Also write down how many minutes of physical activity you do each day  Weigh yourself once a week and record it in your diary  Eating changes: You will need to eat 500 to 1,000 fewer calories each day than you currently eat to lose 1 to 2 pounds a week  The following changes will help you cut calories:  · Eat smaller portions  Use small plates, no larger than 9 inches in diameter  Fill your plate half full of fruits and vegetables  Measure your food using measuring cups until you know what a serving size looks like  · Eat 3 meals and 1 or 2 snacks each day  Plan your meals in advance  Daja Garcia and eat at home most of the time  Eat slowly  · Eat fruits and vegetables at every meal   They are low in calories and high in fiber, which makes you feel full  Do not add butter, margarine, or cream sauce to vegetables  Use herbs to season steamed vegetables  · Eat less fat and fewer fried foods  Eat more baked or grilled chicken and fish  These protein sources are lower in calories and fat than red meat  Limit fast food  Dress your salads with olive oil and vinegar instead of bottled dressing  · Limit the amount of sugar you eat  Do not drink sugary beverages  Limit alcohol  Activity changes:  Physical activity is good for your body in many ways   It helps you burn calories and build strong muscles  It decreases stress and depression, and improves your mood  It can also help you sleep better  Talk to your healthcare provider before you begin an exercise program   · Exercise for at least 30 minutes 5 days a week  Start slowly  Set aside time each day for physical activity that you enjoy and that is convenient for you  It is best to do both weight training and an activity that increases your heart rate, such as walking, bicycling, or swimming  · Find ways to be more active  Do yard work and housecleaning  Walk up the stairs instead of using elevators  Spend your leisure time going to events that require walking, such as outdoor festivals or fairs  This extra physical activity can help you lose weight and keep it off  Follow up with your healthcare provider as directed: You may need to meet with a dietitian  Write down your questions so you remember to ask them during your visits  © 2017 [employer] Information is for End User's use only and may not be sold, redistributed or otherwise used for commercial purposes  All illustrations and images included in CareNotes® are the copyrighted property of [employer]  or Slava Naylor  The above information is an  only  It is not intended as medical advice for individual conditions or treatments  Talk to your doctor, nurse or pharmacist before following any medical regimen to see if it is safe and effective for you  Weight Management   AMBULATORY CARE:   Why it is important to manage your weight:  Being overweight increases your risk of health conditions such as heart disease, high blood pressure, type 2 diabetes, and certain types of cancer  It can also increase your risk for osteoarthritis, sleep apnea, and other respiratory problems  Aim for a slow, steady weight loss  Even a small amount of weight loss can lower your risk of health problems    How to lose weight safely:  A safe and healthy way to lose weight is to eat fewer calories and get regular exercise  You can lose up about 1 pound a week by decreasing the number of calories you eat by 500 calories each day  You can decrease calories by eating smaller portion sizes or by cutting out high-calorie foods  Read labels to find out how many calories are in the foods you eat  You can also burn calories with exercise such as walking, swimming, or biking  You will be more likely to keep weight off if you make these changes part of your lifestyle  Healthy meal plan for weight management:  A healthy meal plan includes a variety of foods, contains fewer calories, and helps you stay healthy  A healthy meal plan includes the following:  · Eat whole-grain foods more often  A healthy meal plan should contain fiber  Fiber is the part of grains, fruits, and vegetables that is not broken down by your body  Whole-grain foods are healthy and provide extra fiber in your diet  Some examples of whole-grain foods are whole-wheat breads and pastas, oatmeal, brown rice, and bulgur  · Eat a variety of vegetables every day  Include dark, leafy greens such as spinach, kale, kalyan greens, and mustard greens  Eat yellow and orange vegetables such as carrots, sweet potatoes, and winter squash  · Eat a variety of fruits every day  Choose fresh or canned fruit (canned in its own juice or light syrup) instead of juice  Fruit juice has very little or no fiber  · Eat low-fat dairy foods  Drink fat-free (skim) milk or 1% milk  Eat fat-free yogurt and low-fat cottage cheese  Try low-fat cheeses such as mozzarella and other reduced-fat cheeses  · Choose meat and other protein foods that are low in fat  Choose beans or other legumes such as split peas or lentils  Choose fish, skinless poultry (chicken or turkey), or lean cuts of red meat (beef or pork)  Before you cook meat or poultry, cut off any visible fat  · Use less fat and oil  Try baking foods instead of frying them  Add less fat, such as margarine, sour cream, regular salad dressing and mayonnaise to foods  Eat fewer high-fat foods  Some examples of high-fat foods include french fries, doughnuts, ice cream, and cakes  · Eat fewer sweets  Limit foods and drinks that are high in sugar  This includes candy, cookies, regular soda, and sweetened drinks  Ways to decrease calories:   · Eat smaller portions  ¨ Use a small plate with smaller servings  ¨ Do not eat second helpings  ¨ When you eat at a restaurant, ask for a box and place half of your meal in the box before you eat  ¨ Share an entrée with someone else  · Replace high-calorie snacks with healthy, low-calorie snacks  ¨ Choose fresh fruit, vegetables, fat-free rice cakes, or air-popped popcorn instead of potato chips, nuts, or chocolate  ¨ Choose water or calorie-free drinks instead of soda or sweetened drinks  · Eat regular meals  Skipping meals can lead to overeating later in the day  Eat a healthy snack in place of a meal if you do not have time to eat a regular meal      · Do not shop for groceries when you are hungry  You may be more likely to make unhealthy food choices  Take a grocery list of healthy foods and shop after you have eaten  Exercise:  Exercise at least 30 minutes per day on most days of the week  Some examples of exercise include walking, biking, dancing, and swimming  You can also fit in more physical activity by taking the stairs instead of the elevator or parking farther away from stores  Ask your healthcare provider about the best exercise plan for you  Other things to consider as you try to lose weight:   · Be aware of situations that may give you the urge to overeat, such as eating while watching television  Find ways to avoid these situations  For example, read a book, go for a walk, or do crafts      · Meet with a weight loss support group or friends who are also trying to lose weight  This may help you stay motivated to continue working on your weight loss goals  © 2017 [employer] Information is for End User's use only and may not be sold, redistributed or otherwise used for commercial purposes  All illustrations and images included in CareNotes® are the copyrighted property of [employer]  or Slava Naylor  The above information is an  only  It is not intended as medical advice for individual conditions or treatments  Talk to your doctor, nurse or pharmacist before following any medical regimen to see if it is safe and effective for you  Low Fat Diet   AMBULATORY CARE:   A low-fat diet  is an eating plan that is low in total fat, unhealthy fat, and cholesterol  You may need to follow a low-fat diet if you have trouble digesting or absorbing fat  You may also need to follow this diet if you have high cholesterol  You can also lower your cholesterol by increasing the amount of fiber in your diet  Soluble fiber is a type of fiber that helps to decrease cholesterol levels  Different types of fat in food:   · Limit unhealthy fats  A diet that is high in cholesterol, saturated fat, and trans fat may cause unhealthy cholesterol levels  Unhealthy cholesterol levels increase your risk of heart disease  ¨ Cholesterol:  Limit intake of cholesterol to less than 200 mg per day  Cholesterol is found in meat, eggs, and dairy  ¨ Saturated fat:  Limit saturated fat to less than 7% of your total daily calories  Ask your dietitian how many calories you need each day  Saturated fat is found in butter, cheese, ice cream, whole milk, and palm oil  Saturated fat is also found in meat, such as beef, pork, chicken skin, and processed meats  Processed meats include sausage, hot dogs, and bologna  ¨ Trans fat:  Avoid trans fat as much as possible  Trans fat is used in fried and baked foods   Foods that say trans fat free on the label may still have up to 0 5 grams of trans fat per serving  · Include healthy fats  Replace foods that are high in saturated and trans fat with foods high in healthy fats  This may help to decrease high cholesterol levels  ¨ Monounsaturated fats: These are found in avocados, nuts, and vegetable oils, such as olive, canola, and sunflower oil  ¨ Polyunsaturated fats: These can be found in vegetable oils, such as soybean or corn oil  Omega-3 fats can help to decrease the risk of heart disease  Omega-3 fats are found in fish, such as salmon, herring, trout, and tuna  Omega-3 fats can also be found in plant foods, such as walnuts, flaxseed, soybeans, and canola oil    Foods to limit or avoid:   · Grains:      ¨ Snacks that are made with partially hydrogenated oils, such as chips, regular crackers, and butter-flavored popcorn    ¨ High-fat baked goods, such as biscuits, croissants, doughnuts, pies, cookies, and pastries    · Dairy:      ¨ Whole milk, 2% milk, and yogurt and ice cream made with whole milk    ¨ Half and half creamer, heavy cream, and whipping cream    ¨ Cheese, cream cheese, and sour cream    · Meats and proteins:      ¨ High-fat cuts of meat (T-bone steak, regular hamburger, and ribs)    ¨ Fried meat, poultry (turkey and chicken), and fish    ¨ Poultry (chicken and turkey) with skin    ¨ Cold cuts (salami or bologna), hot dogs, dixon, and sausage    ¨ Whole eggs and egg yolks    · Vegetables and fruits with added fat:      ¨ Fried vegetables or vegetables in butter or high-fat sauces, such as cream or cheese sauces    ¨ Fried fruit or fruit served with butter or cream    · Fats:      ¨ Butter, stick margarine, and shortening    ¨ Coconut, palm oil, and palm kernel oil  Foods to include:   · Grains:      ¨ Whole-grain breads, cereals, pasta, and brown rice    ¨ Low-fat crackers and pretzels    · Vegetables and fruits:      ¨ Fresh, frozen, or canned vegetables (no salt or low-sodium)    ¨ Fresh, frozen, dried, or canned fruit (canned in light syrup or fruit juice)    ¨ Avocado    · Low-fat dairy products:      ¨ Nonfat (skim) or 1% milk    ¨ Nonfat or low-fat cheese, yogurt, and cottage cheese    · Meats and proteins:      ¨ Chicken or turkey with no skin    ¨ Baked or broiled fish    ¨ Lean beef and pork (loin, round, extra lean hamburger)    ¨ Beans and peas, unsalted nuts, soy products    ¨ Egg whites and substitutes    ¨ Seeds and nuts    · Fats:      ¨ Unsaturated oil, such as canola, olive, peanut, soybean, or sunflower oil    ¨ Soft or liquid margarine and vegetable oil spread    ¨ Low-fat salad dressing  Other ways to decrease fat:   · Read food labels before you buy foods  Choose foods that have less than 30% of calories from fat  Choose low-fat or fat-free dairy products  Remember that fat free does not mean calorie free  These foods still contain calories, and too many calories can lead to weight gain  · Trim fat from meat and avoid fried food  Trim all visible fat from meat before you cook it  Remove the skin from poultry  Do not long meat, fish, or poultry  Bake, roast, boil, or broil these foods instead  Avoid fried foods  Eat a baked potato instead of Western Geri fries  Steam vegetables instead of sautéing them in butter  · Add less fat to foods  Use imitation dixon bits on salads and baked potatoes instead of regular dixon bits  Use fat-free or low-fat salad dressings instead of regular dressings  Use low-fat or nonfat butter-flavored topping instead of regular butter or margarine on popcorn and other foods  Ways to decrease fat in recipes:  Replace high-fat ingredients with low-fat or nonfat ones  This may cause baked goods to be drier than usual  You may need to use nonfat cooking spray on pans to prevent food from sticking  You also may need to change the amount of other ingredients, such as water, in the recipe   Try the following:  · Use low-fat or light margarine instead of regular margarine or shortening  · Use lean ground turkey breast or chicken, or lean ground beef (less than 5% fat) instead of hamburger  · Add 1 teaspoon of canola oil to 8 ounces of skim milk instead of using cream or half and half  · Use grated zucchini, carrots, or apples in breads instead of coconut  · Use blenderized, low-fat cottage cheese, plain tofu, or low-fat ricotta cheese instead of cream cheese  · Use 1 egg white and 1 teaspoon of canola oil, or use ¼ cup (2 ounces) of fat-free egg substitute instead of a whole egg  · Replace half of the oil that is called for in a recipe with applesauce when you bake  Use 3 tablespoons of cocoa powder and 1 tablespoon of canola oil instead of a square of baking chocolate  How to increase fiber:  Eat enough high-fiber foods to get 20 to 30 grams of fiber every day  Slowly increase your fiber intake to avoid stomach cramps, gas, and other problems  · Eat 3 ounces of whole-grain foods each day  An ounce is about 1 slice of bread  Eat whole-grain breads, such as whole-wheat bread  Whole wheat, whole-wheat flour, or other whole grains should be listed as the first ingredient on the food label  Replace white flour with whole-grain flour or use half of each in recipes  Whole-grain flour is heavier than white flour, so you may have to add more yeast or baking powder  · Eat a high-fiber cereal for breakfast   Oatmeal is a good source of soluble fiber  Look for cereals that have bran or fiber in the name  Choose whole-grain products, such as brown rice, barley, and whole-wheat pasta  · Eat more beans, peas, and lentils  For example, add beans to soups or salads  Eat at least 5 cups of fruits and vegetables each day  Eat fruits and vegetables with the peel because the peel is high in fiber  © 2017 [employer] Information is for End User's use only and may not be sold, redistributed or otherwise used for commercial purposes   All illustrations and images included in CareNotes® are the copyrighted property of [employer]  or Slava Naylor  The above information is an  only  It is not intended as medical advice for individual conditions or treatments  Talk to your doctor, nurse or pharmacist before following any medical regimen to see if it is safe and effective for you

## 2019-07-29 NOTE — ASSESSMENT & PLAN NOTE
Recommend  lifestyle modifications like eating a heart healthy diet, regular exercises, and maintaining a  desirable body weight  Advise on reduced added sugars, increase omega-3, eliminate trans fat and saturated fat  Also recommend to avoid fried and fatty foods and limit sodium intake

## 2019-07-29 NOTE — PROGRESS NOTES
Assessment/Plan:    Encounter for well adult exam with abnormal findings  Patient is here for physical exam we find this visit without any distress or abnormalities  We recommended exercise at least three and 0 5 hours per week and healthy diet with a low carbohydrate and low saturated fat  Began to follow up in one year for regular physical exams  Mixed hyperlipidemia  Recommend  lifestyle modifications like eating a heart healthy diet, regular exercises, and maintaining a  desirable body weight  Advise on reduced added sugars, increase omega-3, eliminate trans fat and saturated fat  Also recommend to avoid fried and fatty foods and limit sodium intake  Obesity (BMI 30 0-34 9)  I counseled patient about healthy BMI with benefit of avoiding weight gain to prevent health risks  I recommended increased intake of fruits, vegetables, yogurt, whole grains, and diet soda  Also advised patient on increased physical activity and sleeping 6-8 hours/night  Limiting trans fat, saturated fat, fried foods, sugar and adding low fat foods in her diet  Keloid of skin  -Referral given to General surgery for possible removal of keloid formation on upper back of thorax  Hyperglycemia  -Checking CMP and Hgb A1c    Screening for cholesterol level  -Checking lipid panel  Suspected exposure to hazardous chemical  -Ordering PFT and chest xray  Diagnoses and all orders for this visit:    Encounter for well adult exam with abnormal findings  -     CBC and differential; Future  -     POCT ECG  -     Testosterone; Future    Mixed hyperlipidemia    Obesity (BMI 30 0-34 9)  -     TSH, 3rd generation with Free T4 reflex; Future    Keloid of skin  -     Ambulatory referral to General Surgery; Future    Hyperglycemia  -     Comprehensive metabolic panel; Future  -     Hemoglobin A1C; Future    Screening for cholesterol level  -     Lipid panel;  Future    Suspected exposure to hazardous chemical  -     XR chest pa & lateral; Future  -     Complete pulmonary function test; Future    BMI 34 0-34 9,adult          Subjective:      Patient ID: Leonel tSeen is a 51 y o  male  51year old male patient presents for a physical exam and for keloid removal  States that the keloid was there for about over a year now and its bothering now an causing discomfort  Pain is 5/10  Patient states shortly appeared after mole removal  Also verbalized that 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 and is concerned he may have ingested fumes in his lungs  Denies chest pain/fatigue/shortness of breath of cough  Rash   This is a recurrent problem  The current episode started more than 1 year ago  The problem has been gradually worsening since onset  The affected locations include the back  The rash is characterized by pain (keloid)  Associated with: due to mole removal  Pertinent negatives include no congestion, cough, fatigue, fever or shortness of breath  Past treatments include nothing  The following portions of the patient's history were reviewed and updated as appropriate: allergies, current medications, past family history, past medical history, past social history, past surgical history and problem list     Review of Systems   Constitutional: Negative  Negative for chills, fatigue, fever and unexpected weight change  HENT: Negative  Negative for congestion  Eyes: Negative  Respiratory: Negative  Negative for cough, chest tightness, shortness of breath and wheezing  Cardiovascular: Negative  Negative for chest pain, palpitations and leg swelling  Gastrointestinal: Negative  Endocrine: Negative  Genitourinary: Negative  Musculoskeletal: Negative  Negative for arthralgias  Skin: Positive for rash (keloid on upper back of thorax)  Negative for color change  Allergic/Immunologic: Negative  Neurological: Negative  Hematological: Negative  Psychiatric/Behavioral: Negative            Objective:      BP 110/80 (BP Location: Right arm, Patient Position: Sitting, Cuff Size: Adult)   Pulse 83   Ht 5' 4" (1 626 m)   Wt 90 4 kg (199 lb 3 2 oz)   SpO2 96%   BMI 34 19 kg/m²          Physical Exam   Constitutional: He is oriented to person, place, and time  He appears well-developed and well-nourished  No distress  HENT:   Head: Normocephalic and atraumatic  Right Ear: External ear normal    Left Ear: External ear normal    Nose: Nose normal    Mouth/Throat: Oropharynx is clear and moist    Eyes: Pupils are equal, round, and reactive to light  Conjunctivae and EOM are normal  Right eye exhibits no discharge  Left eye exhibits no discharge  Neck: Normal range of motion  Neck supple  No thyromegaly present  Cardiovascular: Normal rate, regular rhythm, normal heart sounds and intact distal pulses  Exam reveals no gallop and no friction rub  No murmur heard  Pulmonary/Chest: Effort normal and breath sounds normal  No respiratory distress  He has no wheezes  Abdominal: Soft  Bowel sounds are normal  He exhibits no distension  There is no tenderness  Genitourinary: Rectum normal, prostate normal and penis normal    Musculoskeletal: Normal range of motion  Neurological: He is alert and oriented to person, place, and time  He has normal reflexes  Skin: Skin is warm and dry  Capillary refill takes less than 2 seconds  Rash noted  Rash is papular  He is not diaphoretic  No cyanosis or erythema  Nails show no clubbing  Psychiatric: He has a normal mood and affect  His behavior is normal  Judgment and thought content normal    Nursing note and vitals reviewed  BMI Counseling: Body mass index is 34 19 kg/m²  Discussed the patient's BMI with him  The BMI is above average  BMI counseling and education was provided to the patient   Nutrition recommendations include reducing portion sizes, 3-5 servings of fruits/vegetables daily, consuming healthier snacks, reducing intake of saturated fat and trans fat and reducing intake of cholesterol  Exercise recommendations include moderate aerobic physical activity for 150 minutes/week

## 2019-07-30 DIAGNOSIS — E78.2 MIXED HYPERLIPIDEMIA: Primary | ICD-10-CM

## 2019-08-03 ENCOUNTER — APPOINTMENT (OUTPATIENT)
Dept: LAB | Facility: HOSPITAL | Age: 49
End: 2019-08-03
Payer: COMMERCIAL

## 2019-08-03 DIAGNOSIS — E78.2 MIXED HYPERLIPIDEMIA: ICD-10-CM

## 2019-08-03 DIAGNOSIS — Z00.01 ENCOUNTER FOR WELL ADULT EXAM WITH ABNORMAL FINDINGS: ICD-10-CM

## 2019-08-03 LAB
CHOLEST SERPL-MCNC: 260 MG/DL
HDLC SERPL-MCNC: 36 MG/DL (ref 40–59)
LDLC SERPL CALC-MCNC: 191 MG/DL
NONHDLC SERPL-MCNC: 224 MG/DL
TESTOST SERPL-MCNC: 337 NG/DL (ref 113–1065)
TRIGL SERPL-MCNC: 163 MG/DL

## 2019-08-03 PROCEDURE — 36415 COLL VENOUS BLD VENIPUNCTURE: CPT

## 2019-08-03 PROCEDURE — 84403 ASSAY OF TOTAL TESTOSTERONE: CPT

## 2019-08-03 PROCEDURE — 80061 LIPID PANEL: CPT

## 2019-08-05 DIAGNOSIS — E78.2 MIXED HYPERLIPIDEMIA: Primary | ICD-10-CM

## 2020-06-10 ENCOUNTER — OCCMED (OUTPATIENT)
Dept: URGENT CARE | Facility: CLINIC | Age: 50
End: 2020-06-10
Payer: OTHER MISCELLANEOUS

## 2020-06-10 ENCOUNTER — APPOINTMENT (OUTPATIENT)
Dept: RADIOLOGY | Facility: CLINIC | Age: 50
End: 2020-06-10
Payer: OTHER MISCELLANEOUS

## 2020-06-10 DIAGNOSIS — S61.222A LACERATION OF RIGHT MIDDLE FINGER WITH FOREIGN BODY WITHOUT DAMAGE TO NAIL, INITIAL ENCOUNTER: ICD-10-CM

## 2020-06-10 DIAGNOSIS — S67.192A CRUSHING INJURY OF RIGHT MIDDLE FINGER, INITIAL ENCOUNTER: ICD-10-CM

## 2020-06-10 DIAGNOSIS — S67.193A CRUSHING INJURY OF LEFT MIDDLE FINGER, INITIAL ENCOUNTER: Primary | ICD-10-CM

## 2020-06-10 DIAGNOSIS — S67.193A CRUSHING INJURY OF LEFT MIDDLE FINGER, INITIAL ENCOUNTER: ICD-10-CM

## 2020-06-10 PROCEDURE — G0382 LEV 3 HOSP TYPE B ED VISIT: HCPCS | Performed by: PHYSICIAN ASSISTANT

## 2020-06-10 PROCEDURE — 73140 X-RAY EXAM OF FINGER(S): CPT

## 2020-06-10 PROCEDURE — 99283 EMERGENCY DEPT VISIT LOW MDM: CPT | Performed by: PHYSICIAN ASSISTANT

## 2020-06-12 ENCOUNTER — APPOINTMENT (OUTPATIENT)
Dept: URGENT CARE | Facility: CLINIC | Age: 50
End: 2020-06-12
Payer: OTHER MISCELLANEOUS

## 2020-06-12 PROCEDURE — 99213 OFFICE O/P EST LOW 20 MIN: CPT | Performed by: PHYSICIAN ASSISTANT

## 2020-06-16 ENCOUNTER — APPOINTMENT (OUTPATIENT)
Dept: URGENT CARE | Facility: CLINIC | Age: 50
End: 2020-06-16
Payer: OTHER MISCELLANEOUS

## 2020-06-16 PROCEDURE — 99213 OFFICE O/P EST LOW 20 MIN: CPT

## 2020-09-08 ENCOUNTER — TELEPHONE (OUTPATIENT)
Dept: ADMINISTRATIVE | Facility: OTHER | Age: 50
End: 2020-09-08

## 2020-09-08 ENCOUNTER — APPOINTMENT (OUTPATIENT)
Dept: LAB | Facility: HOSPITAL | Age: 50
End: 2020-09-08
Payer: COMMERCIAL

## 2020-09-08 ENCOUNTER — OFFICE VISIT (OUTPATIENT)
Dept: FAMILY MEDICINE CLINIC | Facility: CLINIC | Age: 50
End: 2020-09-08
Payer: COMMERCIAL

## 2020-09-08 VITALS
WEIGHT: 195 LBS | SYSTOLIC BLOOD PRESSURE: 120 MMHG | HEIGHT: 64 IN | OXYGEN SATURATION: 97 % | HEART RATE: 83 BPM | TEMPERATURE: 98 F | RESPIRATION RATE: 20 BRPM | BODY MASS INDEX: 33.29 KG/M2 | DIASTOLIC BLOOD PRESSURE: 80 MMHG

## 2020-09-08 DIAGNOSIS — R53.83 OTHER FATIGUE: ICD-10-CM

## 2020-09-08 DIAGNOSIS — Z13.220 SCREENING FOR CHOLESTEROL LEVEL: ICD-10-CM

## 2020-09-08 DIAGNOSIS — R73.9 HYPERGLYCEMIA: ICD-10-CM

## 2020-09-08 DIAGNOSIS — Z00.01 ENCOUNTER FOR WELL ADULT EXAM WITH ABNORMAL FINDINGS: Primary | ICD-10-CM

## 2020-09-08 DIAGNOSIS — L91.0 KELOID OF SKIN: ICD-10-CM

## 2020-09-08 DIAGNOSIS — E66.9 OBESITY (BMI 30.0-34.9): ICD-10-CM

## 2020-09-08 DIAGNOSIS — N52.9 ERECTILE DYSFUNCTION, UNSPECIFIED ERECTILE DYSFUNCTION TYPE: ICD-10-CM

## 2020-09-08 DIAGNOSIS — Z00.00 ROUTINE CHECK-UP: ICD-10-CM

## 2020-09-08 PROBLEM — Z23 NEEDS FLU SHOT: Status: ACTIVE | Noted: 2020-09-08

## 2020-09-08 LAB
ALBUMIN SERPL BCP-MCNC: 4.3 G/DL (ref 3–5.2)
ALP SERPL-CCNC: 76 U/L (ref 43–122)
ALT SERPL W P-5'-P-CCNC: 36 U/L (ref 9–52)
ANION GAP SERPL CALCULATED.3IONS-SCNC: 7 MMOL/L (ref 5–14)
AST SERPL W P-5'-P-CCNC: 29 U/L (ref 17–59)
BASOPHILS # BLD AUTO: 0 THOUSANDS/ΜL (ref 0–0.1)
BASOPHILS NFR BLD AUTO: 1 % (ref 0–1)
BILIRUB SERPL-MCNC: 0.4 MG/DL
BUN SERPL-MCNC: 14 MG/DL (ref 5–25)
CALCIUM SERPL-MCNC: 9.6 MG/DL (ref 8.4–10.2)
CHLORIDE SERPL-SCNC: 100 MMOL/L (ref 97–108)
CHOLEST SERPL-MCNC: 309 MG/DL
CO2 SERPL-SCNC: 30 MMOL/L (ref 22–30)
CREAT SERPL-MCNC: 1.17 MG/DL (ref 0.7–1.5)
EOSINOPHIL # BLD AUTO: 0.3 THOUSAND/ΜL (ref 0–0.4)
EOSINOPHIL NFR BLD AUTO: 5 % (ref 0–6)
ERYTHROCYTE [DISTWIDTH] IN BLOOD BY AUTOMATED COUNT: 13.4 %
EST. AVERAGE GLUCOSE BLD GHB EST-MCNC: 126 MG/DL
GFR SERPL CREATININE-BSD FRML MDRD: 73 ML/MIN/1.73SQ M
GLUCOSE P FAST SERPL-MCNC: 103 MG/DL (ref 70–99)
HBA1C MFR BLD: 6 %
HCT VFR BLD AUTO: 46.6 % (ref 41–53)
HDLC SERPL-MCNC: 49 MG/DL
HGB BLD-MCNC: 15.9 G/DL (ref 13.5–17.5)
LDLC SERPL CALC-MCNC: 209 MG/DL
LYMPHOCYTES # BLD AUTO: 2.6 THOUSANDS/ΜL (ref 0.5–4)
LYMPHOCYTES NFR BLD AUTO: 43 % (ref 25–45)
MCH RBC QN AUTO: 30.8 PG (ref 26–34)
MCHC RBC AUTO-ENTMCNC: 34.2 G/DL (ref 31–36)
MCV RBC AUTO: 90 FL (ref 80–100)
MONOCYTES # BLD AUTO: 0.5 THOUSAND/ΜL (ref 0.2–0.9)
MONOCYTES NFR BLD AUTO: 8 % (ref 1–10)
NEUTROPHILS # BLD AUTO: 2.7 THOUSANDS/ΜL (ref 1.8–7.8)
NEUTS SEG NFR BLD AUTO: 43 % (ref 45–65)
NONHDLC SERPL-MCNC: 260 MG/DL
PLATELET # BLD AUTO: 309 THOUSANDS/UL (ref 150–450)
PMV BLD AUTO: 8 FL (ref 8.9–12.7)
POTASSIUM SERPL-SCNC: 4.1 MMOL/L (ref 3.6–5)
PROT SERPL-MCNC: 7.9 G/DL (ref 5.9–8.4)
RBC # BLD AUTO: 5.18 MILLION/UL (ref 4.5–5.9)
SODIUM SERPL-SCNC: 137 MMOL/L (ref 137–147)
TRIGL SERPL-MCNC: 253 MG/DL
TSH SERPL DL<=0.05 MIU/L-ACNC: 2.19 UIU/ML (ref 0.47–4.68)
WBC # BLD AUTO: 6.2 THOUSAND/UL (ref 4.5–11)

## 2020-09-08 PROCEDURE — 84443 ASSAY THYROID STIM HORMONE: CPT

## 2020-09-08 PROCEDURE — 83036 HEMOGLOBIN GLYCOSYLATED A1C: CPT

## 2020-09-08 PROCEDURE — 36415 COLL VENOUS BLD VENIPUNCTURE: CPT

## 2020-09-08 PROCEDURE — 99396 PREV VISIT EST AGE 40-64: CPT | Performed by: NURSE PRACTITIONER

## 2020-09-08 PROCEDURE — 80061 LIPID PANEL: CPT

## 2020-09-08 PROCEDURE — 1036F TOBACCO NON-USER: CPT | Performed by: NURSE PRACTITIONER

## 2020-09-08 PROCEDURE — 85025 COMPLETE CBC W/AUTO DIFF WBC: CPT

## 2020-09-08 PROCEDURE — 3725F SCREEN DEPRESSION PERFORMED: CPT | Performed by: NURSE PRACTITIONER

## 2020-09-08 PROCEDURE — 80053 COMPREHEN METABOLIC PANEL: CPT

## 2020-09-08 NOTE — PROGRESS NOTES
BMI Counseling: Body mass index is 33 47 kg/m²  The BMI is above normal  Nutrition recommendations include encouraging healthy choices of fruits and vegetables, decreasing fast food intake, consuming healthier snacks, limiting drinks that contain sugar, increasing intake of lean protein, reducing intake of saturated and trans fat and reducing intake of cholesterol  Exercise recommendations include moderate physical activity 150 minutes/week  Depression Screening and Follow-up Plan: Patient's depression screening was positive with a PHQ-2 score of 0  Clincally patient does not have depression  No treatment is required  Assessment/Plan:    Encounter for well adult exam with abnormal findings  -Patient recommended healthy eating habits  Health risk assessment was discussed with patient also and the ways to stay healthier  Recommended a exercising frequently at least 5 days a week for 30 minutes at a time; such as joining a gym if not already enrolled or walking  Eating low-fat and low-cholesterol foods with avoidance of saturated fats or fried foods  Immunizations, and the need to comply with current CDC's recommendations were discussed  To follow up yearly for physical exams       Diagnoses and all orders for this visit:    Encounter for well adult exam with abnormal findings    Hyperglycemia  -     Comprehensive metabolic panel; Future  -     HEMOGLOBIN A1C W/ EAG ESTIMATION; Future    Other fatigue  -     CBC and differential; Future  -     TSH, 3rd generation with Free T4 reflex; Future    Screening for cholesterol level  -     Lipid panel; Future    Routine check-up  -     Ambulatory referral to Ophthalmology; Future    Keloid of skin  -     Ambulatory referral to General Surgery; Future    Erectile dysfunction, unspecified erectile dysfunction type  -     Ambulatory referral to Urology;  Future    Obesity (BMI 30 0-34 9)    Other orders  -     Cancel: influenza vaccine, quadrivalent, 0 5 mL, preservative-free, for adult and pediatric patients 6 mos+ (AFLURIA, FLUARIX, FLULAVAL, FLUZONE); Future          Subjective:      Patient ID: Maribell Ortiz is a 53 y o  male  Patient presents with:  Physical Exam    53year old male patient here for physical exam  States he is exercising and his BMI is 33 47 in walking  Eating healthy per patient  Has seen the eye doctor this year per patient  States he has cataracts  Dentist was last year, had a cleaning  The following portions of the patient's history were reviewed and updated as appropriate: allergies, current medications, past family history, past medical history, past social history, past surgical history and problem list     Review of Systems   Constitutional: Negative  Negative for appetite change, fatigue and fever  HENT: Negative  Eyes: Negative  Respiratory: Negative  Negative for cough, chest tightness, shortness of breath and wheezing  Cardiovascular: Negative  Negative for chest pain and palpitations  Gastrointestinal: Negative  Negative for abdominal distention  Endocrine: Negative  Genitourinary: Negative  Erectile dysfunction   Musculoskeletal: Negative  Skin: Negative  Allergic/Immunologic: Negative  Neurological: Negative  Negative for dizziness and headaches  Hematological: Negative  Psychiatric/Behavioral: Negative  Objective:      /80 (BP Location: Left arm, Patient Position: Sitting, Cuff Size: Standard)   Pulse 83   Temp 98 °F (36 7 °C) (Temporal)   Resp 20   Ht 5' 4" (1 626 m)   Wt 88 5 kg (195 lb)   SpO2 97%   BMI 33 47 kg/m²          Physical Exam  Vitals signs and nursing note reviewed  Constitutional:       General: He is not in acute distress  Appearance: Normal appearance  He is well-developed  He is obese  He is not ill-appearing, toxic-appearing or diaphoretic  HENT:      Head: Normocephalic and atraumatic        Right Ear: Tympanic membrane, ear canal and external ear normal  There is no impacted cerumen  Left Ear: Tympanic membrane, ear canal and external ear normal  There is no impacted cerumen  Nose: Nose normal  No congestion or rhinorrhea  Mouth/Throat:      Mouth: Mucous membranes are moist       Pharynx: Oropharynx is clear  No oropharyngeal exudate or posterior oropharyngeal erythema  Eyes:      General:         Right eye: No discharge  Left eye: No discharge  Extraocular Movements: Extraocular movements intact  Conjunctiva/sclera: Conjunctivae normal       Pupils: Pupils are equal, round, and reactive to light  Neck:      Musculoskeletal: Normal range of motion and neck supple  No neck rigidity or muscular tenderness  Thyroid: No thyromegaly  Cardiovascular:      Rate and Rhythm: Normal rate and regular rhythm  Pulses: Normal pulses  Heart sounds: Normal heart sounds  No murmur  No friction rub  No gallop  Pulmonary:      Effort: Pulmonary effort is normal  No respiratory distress  Breath sounds: Normal breath sounds  No wheezing  Abdominal:      General: Bowel sounds are normal  There is no distension  Palpations: Abdomen is soft  Tenderness: There is no abdominal tenderness  Genitourinary:     Comments: deferred  Musculoskeletal: Normal range of motion  General: No tenderness  Skin:     General: Skin is warm and dry  Capillary Refill: Capillary refill takes less than 2 seconds  Neurological:      General: No focal deficit present  Mental Status: He is alert and oriented to person, place, and time  Deep Tendon Reflexes: Reflexes are normal and symmetric  Psychiatric:         Mood and Affect: Mood normal          Behavior: Behavior normal          Thought Content:  Thought content normal          Judgment: Judgment normal

## 2020-09-08 NOTE — TELEPHONE ENCOUNTER
Upon review of the In Basket request we were able to locate, review, and update the patient chart as requested for HIV  Any additional questions or concerns should be emailed to the Practice Liaisons via [email or web address] email, please do not reply via In Basket      Thank you  Eleanor Celaya

## 2020-09-08 NOTE — ASSESSMENT & PLAN NOTE
-Patient recommended healthy eating habits  Health risk assessment was discussed with patient also and the ways to stay healthier  Recommended a exercising frequently at least 5 days a week for 30 minutes at a time; such as joining a gym if not already enrolled or walking  Eating low-fat and low-cholesterol foods with avoidance of saturated fats or fried foods  Immunizations, and the need to comply with current CDC's recommendations were discussed   To follow up yearly for physical exams

## 2020-09-08 NOTE — TELEPHONE ENCOUNTER
----- Message from Raz Gonsalves sent at 9/8/2020 12:28 PM EDT -----  Regarding: HIV test  09/08/20 12:28 PM    Hello, our patient No patient name on file  has had HIV completed/performed  Please assist in updating the patient chart by pulling the Care Everywhere (CE) document  The date of service is ##10/22/2008#       Thank you,  Tristan Catherine   [address]

## 2020-09-10 ENCOUNTER — TELEPHONE (OUTPATIENT)
Dept: SURGERY | Facility: CLINIC | Age: 50
End: 2020-09-10

## 2020-09-10 NOTE — TELEPHONE ENCOUNTER
Called patient to schedule appt    Unable to leave message as phone has a mail box that is full and cannot accept any messages

## 2020-09-14 DIAGNOSIS — E78.2 MIXED HYPERLIPIDEMIA: Primary | ICD-10-CM

## 2020-09-16 DIAGNOSIS — L91.0 KELOID OF SKIN: Primary | ICD-10-CM

## 2021-10-25 ENCOUNTER — APPOINTMENT (OUTPATIENT)
Dept: LAB | Facility: HOSPITAL | Age: 51
End: 2021-10-25
Payer: COMMERCIAL

## 2021-10-25 ENCOUNTER — OFFICE VISIT (OUTPATIENT)
Dept: FAMILY MEDICINE CLINIC | Facility: CLINIC | Age: 51
End: 2021-10-25
Payer: COMMERCIAL

## 2021-10-25 VITALS
SYSTOLIC BLOOD PRESSURE: 130 MMHG | HEIGHT: 65 IN | DIASTOLIC BLOOD PRESSURE: 80 MMHG | BODY MASS INDEX: 32.65 KG/M2 | RESPIRATION RATE: 18 BRPM | OXYGEN SATURATION: 99 % | WEIGHT: 196 LBS | TEMPERATURE: 98 F | HEART RATE: 92 BPM

## 2021-10-25 DIAGNOSIS — R73.9 HYPERGLYCEMIA: ICD-10-CM

## 2021-10-25 DIAGNOSIS — E55.9 VITAMIN D DEFICIENCY: ICD-10-CM

## 2021-10-25 DIAGNOSIS — Z11.59 ENCOUNTER FOR HEPATITIS C SCREENING TEST FOR LOW RISK PATIENT: ICD-10-CM

## 2021-10-25 DIAGNOSIS — Z12.5 SCREENING FOR PROSTATE CANCER: ICD-10-CM

## 2021-10-25 DIAGNOSIS — Z00.00 ANNUAL PHYSICAL EXAM: Primary | ICD-10-CM

## 2021-10-25 DIAGNOSIS — R53.83 OTHER FATIGUE: ICD-10-CM

## 2021-10-25 DIAGNOSIS — R10.13 EPIGASTRIC PAIN: ICD-10-CM

## 2021-10-25 DIAGNOSIS — E78.2 MIXED HYPERLIPIDEMIA: ICD-10-CM

## 2021-10-25 DIAGNOSIS — Z12.11 SCREENING FOR COLON CANCER: ICD-10-CM

## 2021-10-25 LAB
25(OH)D3 SERPL-MCNC: 21 NG/ML (ref 30–100)
ALBUMIN SERPL BCP-MCNC: 4.4 G/DL (ref 3–5.2)
ALP SERPL-CCNC: 96 U/L (ref 43–122)
ALT SERPL W P-5'-P-CCNC: 35 U/L
ANION GAP SERPL CALCULATED.3IONS-SCNC: 6 MMOL/L (ref 5–14)
AST SERPL W P-5'-P-CCNC: 32 U/L (ref 17–59)
BASOPHILS # BLD AUTO: 0.1 THOUSANDS/ΜL (ref 0–0.1)
BASOPHILS NFR BLD AUTO: 1 % (ref 0–1)
BILIRUB SERPL-MCNC: 0.68 MG/DL
BUN SERPL-MCNC: 12 MG/DL (ref 5–25)
CALCIUM SERPL-MCNC: 9.6 MG/DL (ref 8.4–10.2)
CHLORIDE SERPL-SCNC: 104 MMOL/L (ref 97–108)
CHOLEST SERPL-MCNC: 275 MG/DL
CO2 SERPL-SCNC: 27 MMOL/L (ref 22–30)
CREAT SERPL-MCNC: 0.82 MG/DL (ref 0.7–1.5)
EOSINOPHIL # BLD AUTO: 0.3 THOUSAND/ΜL (ref 0–0.4)
EOSINOPHIL NFR BLD AUTO: 7 % (ref 0–6)
ERYTHROCYTE [DISTWIDTH] IN BLOOD BY AUTOMATED COUNT: 13.4 %
EST. AVERAGE GLUCOSE BLD GHB EST-MCNC: 114 MG/DL
GFR SERPL CREATININE-BSD FRML MDRD: 103 ML/MIN/1.73SQ M
GLUCOSE P FAST SERPL-MCNC: 119 MG/DL (ref 70–99)
HBA1C MFR BLD: 5.6 %
HCT VFR BLD AUTO: 47.3 % (ref 41–53)
HCV AB SER QL: NORMAL
HDLC SERPL-MCNC: 44 MG/DL
HGB BLD-MCNC: 15.6 G/DL (ref 13.5–17.5)
LDLC SERPL CALC-MCNC: 188 MG/DL
LYMPHOCYTES # BLD AUTO: 1.8 THOUSANDS/ΜL (ref 0.5–4)
LYMPHOCYTES NFR BLD AUTO: 35 % (ref 25–45)
MCH RBC QN AUTO: 30 PG (ref 26–34)
MCHC RBC AUTO-ENTMCNC: 33 G/DL (ref 31–36)
MCV RBC AUTO: 91 FL (ref 80–100)
MONOCYTES # BLD AUTO: 0.3 THOUSAND/ΜL (ref 0.2–0.9)
MONOCYTES NFR BLD AUTO: 6 % (ref 1–10)
NEUTROPHILS # BLD AUTO: 2.6 THOUSANDS/ΜL (ref 1.8–7.8)
NEUTS SEG NFR BLD AUTO: 52 % (ref 45–65)
NONHDLC SERPL-MCNC: 231 MG/DL
PLATELET # BLD AUTO: 321 THOUSANDS/UL (ref 150–450)
PMV BLD AUTO: 8.2 FL (ref 8.9–12.7)
POTASSIUM SERPL-SCNC: 4.1 MMOL/L (ref 3.6–5)
PROT SERPL-MCNC: 8.2 G/DL (ref 5.9–8.4)
RBC # BLD AUTO: 5.21 MILLION/UL (ref 4.5–5.9)
SODIUM SERPL-SCNC: 137 MMOL/L (ref 137–147)
TRIGL SERPL-MCNC: 216 MG/DL
WBC # BLD AUTO: 5.1 THOUSAND/UL (ref 4.5–11)

## 2021-10-25 PROCEDURE — 36415 COLL VENOUS BLD VENIPUNCTURE: CPT

## 2021-10-25 PROCEDURE — 82306 VITAMIN D 25 HYDROXY: CPT

## 2021-10-25 PROCEDURE — 80053 COMPREHEN METABOLIC PANEL: CPT

## 2021-10-25 PROCEDURE — 3008F BODY MASS INDEX DOCD: CPT | Performed by: NURSE PRACTITIONER

## 2021-10-25 PROCEDURE — 1036F TOBACCO NON-USER: CPT | Performed by: NURSE PRACTITIONER

## 2021-10-25 PROCEDURE — 86803 HEPATITIS C AB TEST: CPT

## 2021-10-25 PROCEDURE — 84153 ASSAY OF PSA TOTAL: CPT

## 2021-10-25 PROCEDURE — 80061 LIPID PANEL: CPT

## 2021-10-25 PROCEDURE — 83036 HEMOGLOBIN GLYCOSYLATED A1C: CPT

## 2021-10-25 PROCEDURE — 84154 ASSAY OF PSA FREE: CPT

## 2021-10-25 PROCEDURE — 3725F SCREEN DEPRESSION PERFORMED: CPT | Performed by: NURSE PRACTITIONER

## 2021-10-25 PROCEDURE — 85025 COMPLETE CBC W/AUTO DIFF WBC: CPT

## 2021-10-25 PROCEDURE — 99396 PREV VISIT EST AGE 40-64: CPT | Performed by: NURSE PRACTITIONER

## 2021-10-27 LAB
PSA FREE MFR SERPL: 30 %
PSA FREE SERPL-MCNC: 0.18 NG/ML
PSA SERPL-MCNC: 0.6 NG/ML (ref 0–4)

## 2021-11-10 ENCOUNTER — CONSULT (OUTPATIENT)
Dept: SURGERY | Facility: CLINIC | Age: 51
End: 2021-11-10
Payer: COMMERCIAL

## 2021-11-10 ENCOUNTER — PREP FOR PROCEDURE (OUTPATIENT)
Dept: SURGERY | Facility: CLINIC | Age: 51
End: 2021-11-10

## 2021-11-10 VITALS
HEART RATE: 88 BPM | WEIGHT: 196 LBS | DIASTOLIC BLOOD PRESSURE: 80 MMHG | TEMPERATURE: 98.1 F | HEIGHT: 65 IN | BODY MASS INDEX: 32.65 KG/M2 | SYSTOLIC BLOOD PRESSURE: 128 MMHG

## 2021-11-10 DIAGNOSIS — Z12.11 ENCOUNTER FOR SCREENING COLONOSCOPY: Primary | ICD-10-CM

## 2021-11-10 DIAGNOSIS — Z12.11 SCREENING FOR COLON CANCER: Primary | ICD-10-CM

## 2021-11-10 PROCEDURE — 3008F BODY MASS INDEX DOCD: CPT | Performed by: SURGERY

## 2021-11-10 PROCEDURE — 99244 OFF/OP CNSLTJ NEW/EST MOD 40: CPT | Performed by: SURGERY

## 2021-11-26 ENCOUNTER — TELEPHONE (OUTPATIENT)
Dept: PREADMISSION TESTING | Facility: HOSPITAL | Age: 51
End: 2021-11-26

## 2021-12-07 ENCOUNTER — TELEPHONE (OUTPATIENT)
Dept: GASTROENTEROLOGY | Facility: HOSPITAL | Age: 51
End: 2021-12-07

## 2021-12-08 ENCOUNTER — ANESTHESIA EVENT (OUTPATIENT)
Dept: GASTROENTEROLOGY | Facility: HOSPITAL | Age: 51
End: 2021-12-08

## 2021-12-08 ENCOUNTER — HOSPITAL ENCOUNTER (OUTPATIENT)
Dept: GASTROENTEROLOGY | Facility: HOSPITAL | Age: 51
Setting detail: OUTPATIENT SURGERY
Discharge: HOME | End: 2021-12-08
Attending: SURGERY | Admitting: SURGERY
Payer: COMMERCIAL

## 2021-12-08 ENCOUNTER — ANESTHESIA (OUTPATIENT)
Dept: GASTROENTEROLOGY | Facility: HOSPITAL | Age: 51
End: 2021-12-08

## 2021-12-08 VITALS
DIASTOLIC BLOOD PRESSURE: 63 MMHG | OXYGEN SATURATION: 98 % | HEIGHT: 65 IN | HEART RATE: 50 BPM | RESPIRATION RATE: 20 BRPM | BODY MASS INDEX: 32.62 KG/M2 | SYSTOLIC BLOOD PRESSURE: 123 MMHG | TEMPERATURE: 96.7 F

## 2021-12-08 DIAGNOSIS — Z12.11 ENCOUNTER FOR SCREENING COLONOSCOPY: ICD-10-CM

## 2021-12-08 PROCEDURE — 88305 TISSUE EXAM BY PATHOLOGIST: CPT | Performed by: PATHOLOGY

## 2021-12-08 PROCEDURE — 45380 COLONOSCOPY AND BIOPSY: CPT | Performed by: SURGERY

## 2021-12-08 RX ORDER — SODIUM CHLORIDE 9 MG/ML
125 INJECTION, SOLUTION INTRAVENOUS CONTINUOUS
Status: DISCONTINUED | OUTPATIENT
Start: 2021-12-08 | End: 2021-12-12 | Stop reason: HOSPADM

## 2021-12-08 RX ORDER — SODIUM CHLORIDE 9 MG/ML
75 INJECTION, SOLUTION INTRAVENOUS CONTINUOUS
Status: CANCELLED | OUTPATIENT
Start: 2021-12-08

## 2021-12-08 RX ORDER — SODIUM CHLORIDE 9 MG/ML
INJECTION, SOLUTION INTRAVENOUS CONTINUOUS PRN
Status: DISCONTINUED | OUTPATIENT
Start: 2021-12-08 | End: 2021-12-08

## 2021-12-08 RX ORDER — LIDOCAINE HYDROCHLORIDE 10 MG/ML
INJECTION, SOLUTION EPIDURAL; INFILTRATION; INTRACAUDAL; PERINEURAL AS NEEDED
Status: DISCONTINUED | OUTPATIENT
Start: 2021-12-08 | End: 2021-12-08

## 2021-12-08 RX ORDER — PROPOFOL 10 MG/ML
INJECTION, EMULSION INTRAVENOUS AS NEEDED
Status: DISCONTINUED | OUTPATIENT
Start: 2021-12-08 | End: 2021-12-08

## 2021-12-08 RX ADMIN — PROPOFOL 150 MG: 10 INJECTION, EMULSION INTRAVENOUS at 09:34

## 2021-12-08 RX ADMIN — PROPOFOL 50 MG: 10 INJECTION, EMULSION INTRAVENOUS at 09:44

## 2021-12-08 RX ADMIN — PROPOFOL 50 MG: 10 INJECTION, EMULSION INTRAVENOUS at 09:49

## 2021-12-08 RX ADMIN — SODIUM CHLORIDE: 0.9 INJECTION, SOLUTION INTRAVENOUS at 08:21

## 2021-12-08 RX ADMIN — LIDOCAINE HYDROCHLORIDE 50 MG: 10 INJECTION, SOLUTION EPIDURAL; INFILTRATION; INTRACAUDAL; PERINEURAL at 09:34

## 2021-12-08 RX ADMIN — PROPOFOL 30 MG: 10 INJECTION, EMULSION INTRAVENOUS at 09:54

## 2021-12-08 RX ADMIN — SODIUM CHLORIDE 125 ML/HR: 0.9 INJECTION, SOLUTION INTRAVENOUS at 08:22

## 2021-12-08 RX ADMIN — PROPOFOL 50 MG: 10 INJECTION, EMULSION INTRAVENOUS at 09:39

## 2021-12-17 ENCOUNTER — TELEPHONE (OUTPATIENT)
Dept: SURGERY | Facility: CLINIC | Age: 51
End: 2021-12-17

## 2021-12-22 ENCOUNTER — OFFICE VISIT (OUTPATIENT)
Dept: UROLOGY | Facility: CLINIC | Age: 51
End: 2021-12-22
Payer: COMMERCIAL

## 2021-12-22 VITALS
SYSTOLIC BLOOD PRESSURE: 124 MMHG | HEART RATE: 78 BPM | BODY MASS INDEX: 33.49 KG/M2 | DIASTOLIC BLOOD PRESSURE: 80 MMHG | WEIGHT: 201 LBS | HEIGHT: 65 IN

## 2021-12-22 DIAGNOSIS — N52.9 ERECTILE DYSFUNCTION, UNSPECIFIED ERECTILE DYSFUNCTION TYPE: Primary | ICD-10-CM

## 2021-12-22 DIAGNOSIS — Z12.5 SCREENING FOR PROSTATE CANCER: ICD-10-CM

## 2021-12-22 LAB
SL AMB  POCT GLUCOSE, UA: ABNORMAL
SL AMB LEUKOCYTE ESTERASE,UA: ABNORMAL
SL AMB POCT BILIRUBIN,UA: ABNORMAL
SL AMB POCT BLOOD,UA: ABNORMAL
SL AMB POCT CLARITY,UA: CLEAR
SL AMB POCT COLOR,UA: YELLOW
SL AMB POCT KETONES,UA: ABNORMAL
SL AMB POCT NITRITE,UA: ABNORMAL
SL AMB POCT PH,UA: 7
SL AMB POCT SPECIFIC GRAVITY,UA: 1.01
SL AMB POCT URINE PROTEIN: ABNORMAL
SL AMB POCT UROBILINOGEN: 0.2

## 2021-12-22 PROCEDURE — 1036F TOBACCO NON-USER: CPT | Performed by: NURSE PRACTITIONER

## 2021-12-22 PROCEDURE — 81002 URINALYSIS NONAUTO W/O SCOPE: CPT | Performed by: NURSE PRACTITIONER

## 2021-12-22 PROCEDURE — 99242 OFF/OP CONSLTJ NEW/EST SF 20: CPT | Performed by: NURSE PRACTITIONER

## 2021-12-22 PROCEDURE — 3008F BODY MASS INDEX DOCD: CPT | Performed by: NURSE PRACTITIONER

## 2021-12-22 RX ORDER — OMEGA-3S/DHA/EPA/FISH OIL/D3 300MG-1000
400 CAPSULE ORAL DAILY
COMMUNITY
End: 2022-01-18

## 2021-12-22 RX ORDER — SILDENAFIL CITRATE 20 MG/1
20 TABLET ORAL AS NEEDED
Qty: 20 TABLET | Refills: 6 | Status: SHIPPED | OUTPATIENT
Start: 2021-12-22 | End: 2022-01-18

## 2022-01-10 ENCOUNTER — HOSPITAL ENCOUNTER (OUTPATIENT)
Facility: HOSPITAL | Age: 52
Setting detail: OBSERVATION
Discharge: HOME | End: 2022-01-12
Attending: EMERGENCY MEDICINE | Admitting: SURGERY
Payer: COMMERCIAL

## 2022-01-10 ENCOUNTER — APPOINTMENT (EMERGENCY)
Dept: ULTRASOUND IMAGING | Facility: HOSPITAL | Age: 52
End: 2022-01-10
Payer: COMMERCIAL

## 2022-01-10 DIAGNOSIS — K81.0 ACUTE CHOLECYSTITIS: ICD-10-CM

## 2022-01-10 DIAGNOSIS — K80.00 ACUTE CALCULOUS CHOLECYSTITIS: Primary | ICD-10-CM

## 2022-01-10 LAB
ALBUMIN SERPL BCP-MCNC: 3.5 G/DL (ref 3.5–5)
ALP SERPL-CCNC: 94 U/L (ref 46–116)
ALT SERPL W P-5'-P-CCNC: 44 U/L (ref 12–78)
ANION GAP SERPL CALCULATED.3IONS-SCNC: 9 MMOL/L (ref 4–13)
AST SERPL W P-5'-P-CCNC: 20 U/L (ref 5–45)
BASOPHILS # BLD AUTO: 0.03 THOUSANDS/ΜL (ref 0–0.1)
BASOPHILS NFR BLD AUTO: 0 % (ref 0–1)
BILIRUB SERPL-MCNC: 0.74 MG/DL (ref 0.2–1)
BUN SERPL-MCNC: 10 MG/DL (ref 5–25)
CALCIUM SERPL-MCNC: 8.3 MG/DL (ref 8.3–10.1)
CHLORIDE SERPL-SCNC: 98 MMOL/L (ref 100–108)
CO2 SERPL-SCNC: 29 MMOL/L (ref 21–32)
CREAT SERPL-MCNC: 1.08 MG/DL (ref 0.6–1.3)
EOSINOPHIL # BLD AUTO: 0 THOUSAND/ΜL (ref 0–0.61)
EOSINOPHIL NFR BLD AUTO: 0 % (ref 0–6)
ERYTHROCYTE [DISTWIDTH] IN BLOOD BY AUTOMATED COUNT: 13.3 % (ref 11.6–15.1)
GFR SERPL CREATININE-BSD FRML MDRD: 79 ML/MIN/1.73SQ M
GLUCOSE SERPL-MCNC: 132 MG/DL (ref 65–140)
HCT VFR BLD AUTO: 46 % (ref 36.5–49.3)
HGB BLD-MCNC: 15.4 G/DL (ref 12–17)
IMM GRANULOCYTES # BLD AUTO: 0.09 THOUSAND/UL (ref 0–0.2)
IMM GRANULOCYTES NFR BLD AUTO: 1 % (ref 0–2)
LIPASE SERPL-CCNC: 88 U/L (ref 73–393)
LYMPHOCYTES # BLD AUTO: 1.74 THOUSANDS/ΜL (ref 0.6–4.47)
LYMPHOCYTES NFR BLD AUTO: 12 % (ref 14–44)
MCH RBC QN AUTO: 29.4 PG (ref 26.8–34.3)
MCHC RBC AUTO-ENTMCNC: 33.5 G/DL (ref 31.4–37.4)
MCV RBC AUTO: 88 FL (ref 82–98)
MONOCYTES # BLD AUTO: 0.81 THOUSAND/ΜL (ref 0.17–1.22)
MONOCYTES NFR BLD AUTO: 5 % (ref 4–12)
NEUTROPHILS # BLD AUTO: 12.4 THOUSANDS/ΜL (ref 1.85–7.62)
NEUTS SEG NFR BLD AUTO: 82 % (ref 43–75)
NRBC BLD AUTO-RTO: 0 /100 WBCS
PLATELET # BLD AUTO: 326 THOUSANDS/UL (ref 149–390)
PMV BLD AUTO: 9.1 FL (ref 8.9–12.7)
POTASSIUM SERPL-SCNC: 3.6 MMOL/L (ref 3.5–5.3)
PROT SERPL-MCNC: 7.7 G/DL (ref 6.4–8.2)
RBC # BLD AUTO: 5.23 MILLION/UL (ref 3.88–5.62)
SODIUM SERPL-SCNC: 136 MMOL/L (ref 136–145)
WBC # BLD AUTO: 15.07 THOUSAND/UL (ref 4.31–10.16)

## 2022-01-10 PROCEDURE — 99285 EMERGENCY DEPT VISIT HI MDM: CPT | Performed by: EMERGENCY MEDICINE

## 2022-01-10 PROCEDURE — 96361 HYDRATE IV INFUSION ADD-ON: CPT

## 2022-01-10 PROCEDURE — 76705 ECHO EXAM OF ABDOMEN: CPT

## 2022-01-10 PROCEDURE — 83690 ASSAY OF LIPASE: CPT | Performed by: EMERGENCY MEDICINE

## 2022-01-10 PROCEDURE — 96374 THER/PROPH/DIAG INJ IV PUSH: CPT

## 2022-01-10 PROCEDURE — 96375 TX/PRO/DX INJ NEW DRUG ADDON: CPT

## 2022-01-10 PROCEDURE — 99285 EMERGENCY DEPT VISIT HI MDM: CPT

## 2022-01-10 PROCEDURE — 36415 COLL VENOUS BLD VENIPUNCTURE: CPT | Performed by: EMERGENCY MEDICINE

## 2022-01-10 PROCEDURE — 76705 ECHO EXAM OF ABDOMEN: CPT | Performed by: EMERGENCY MEDICINE

## 2022-01-10 PROCEDURE — 85025 COMPLETE CBC W/AUTO DIFF WBC: CPT | Performed by: EMERGENCY MEDICINE

## 2022-01-10 PROCEDURE — 99219 PR INITIAL OBSERVATION CARE/DAY 50 MINUTES: CPT | Performed by: SURGERY

## 2022-01-10 PROCEDURE — 80053 COMPREHEN METABOLIC PANEL: CPT | Performed by: EMERGENCY MEDICINE

## 2022-01-10 RX ORDER — FENTANYL CITRATE 50 UG/ML
50 INJECTION, SOLUTION INTRAMUSCULAR; INTRAVENOUS ONCE
Status: COMPLETED | OUTPATIENT
Start: 2022-01-10 | End: 2022-01-10

## 2022-01-10 RX ORDER — SODIUM CHLORIDE, SODIUM LACTATE, POTASSIUM CHLORIDE, CALCIUM CHLORIDE 600; 310; 30; 20 MG/100ML; MG/100ML; MG/100ML; MG/100ML
130 INJECTION, SOLUTION INTRAVENOUS CONTINUOUS
Status: DISCONTINUED | OUTPATIENT
Start: 2022-01-10 | End: 2022-01-12

## 2022-01-10 RX ORDER — ONDANSETRON 4 MG/1
4 TABLET, ORALLY DISINTEGRATING ORAL EVERY 6 HOURS PRN
Status: DISCONTINUED | OUTPATIENT
Start: 2022-01-10 | End: 2022-01-12 | Stop reason: HOSPADM

## 2022-01-10 RX ORDER — OXYCODONE HYDROCHLORIDE 10 MG/1
10 TABLET ORAL EVERY 4 HOURS PRN
Status: DISCONTINUED | OUTPATIENT
Start: 2022-01-10 | End: 2022-01-12 | Stop reason: HOSPADM

## 2022-01-10 RX ORDER — HYDROMORPHONE HCL/PF 1 MG/ML
0.5 SYRINGE (ML) INJECTION
Status: DISCONTINUED | OUTPATIENT
Start: 2022-01-10 | End: 2022-01-12 | Stop reason: HOSPADM

## 2022-01-10 RX ORDER — ONDANSETRON 2 MG/ML
4 INJECTION INTRAMUSCULAR; INTRAVENOUS ONCE
Status: COMPLETED | OUTPATIENT
Start: 2022-01-10 | End: 2022-01-10

## 2022-01-10 RX ORDER — HEPARIN SODIUM 5000 [USP'U]/ML
5000 INJECTION, SOLUTION INTRAVENOUS; SUBCUTANEOUS EVERY 8 HOURS SCHEDULED
Status: DISCONTINUED | OUTPATIENT
Start: 2022-01-10 | End: 2022-01-12 | Stop reason: HOSPADM

## 2022-01-10 RX ORDER — OXYCODONE HYDROCHLORIDE 5 MG/1
5 TABLET ORAL EVERY 4 HOURS PRN
Status: DISCONTINUED | OUTPATIENT
Start: 2022-01-10 | End: 2022-01-12 | Stop reason: HOSPADM

## 2022-01-10 RX ADMIN — OXYCODONE HYDROCHLORIDE 10 MG: 10 TABLET ORAL at 21:56

## 2022-01-10 RX ADMIN — SODIUM CHLORIDE, SODIUM LACTATE, POTASSIUM CHLORIDE, AND CALCIUM CHLORIDE 130 ML/HR: .6; .31; .03; .02 INJECTION, SOLUTION INTRAVENOUS at 21:56

## 2022-01-10 RX ADMIN — ONDANSETRON 4 MG: 2 INJECTION INTRAMUSCULAR; INTRAVENOUS at 18:23

## 2022-01-10 RX ADMIN — PIPERACILLIN AND TAZOBACTAM 3.38 G: 3; .375 INJECTION, POWDER, LYOPHILIZED, FOR SOLUTION INTRAVENOUS at 23:42

## 2022-01-10 RX ADMIN — HEPARIN SODIUM 5000 UNITS: 5000 INJECTION INTRAVENOUS; SUBCUTANEOUS at 23:42

## 2022-01-10 RX ADMIN — SODIUM CHLORIDE 1000 ML: 0.9 INJECTION, SOLUTION INTRAVENOUS at 18:29

## 2022-01-10 RX ADMIN — FENTANYL CITRATE 50 MCG: 50 INJECTION INTRAMUSCULAR; INTRAVENOUS at 18:26

## 2022-01-10 NOTE — ED PROVIDER NOTES
History  Chief Complaint   Patient presents with    Abdominal Pain     Upper abdominal pain starting early this morning  Vomiting  Linda Macias is a 49y o  year old male presenting to the [facility] ED for abdominal pain  Patient has had one day of epigastric and right upper quadrant abdominal pain  Rated as 8/10, constant though waxes/wanes in intensity  Radiating to the back on the right side  Associated nausea and two episodes of NB vomiting  Patient denies associated fevers, diarrhea, flank pain or dysuria/hematuria  The patient has taken ibuprofen at home for symptomatic treatment  Patient denies history of prior abdominal surgeries  History provided by:  Patient   used: No    Abdominal Pain  Associated symptoms: nausea and vomiting    Associated symptoms: no chest pain, no chills, no constipation, no cough, no diarrhea, no dysuria, no fatigue, no fever, no hematuria and no shortness of breath        Prior to Admission Medications   Prescriptions Last Dose Informant Patient Reported? Taking?   atorvastatin (LIPITOR) 20 mg tablet Not Taking at Unknown time  No No   Sig: Take 1 tablet (20 mg total) by mouth daily   Patient not taking: Reported on 12/22/2021    cholecalciferol (VITAMIN D3) 400 units tablet Not Taking at Unknown time  Yes No   Sig: Take 400 Units by mouth daily   Patient not taking: Reported on 1/10/2022    sildenafil (REVATIO) 20 mg tablet Not Taking at Unknown time  No No   Sig: Take 1 tablet (20 mg total) by mouth as needed (erectile dysfunction) Take 1 tablet 2 hours after meal and 1 hour before sexual intercourse  If 1 tablet is not effective during the next sexual encounter can increase to 2 tablets  You can increase the dose by 1 tablet per encounter to a maximum of 5 tablets per encounter     Patient not taking: Reported on 1/10/2022       Facility-Administered Medications: None       Past Medical History:   Diagnosis Date    Elevated cholesterol     Fingertip amputation     right 5th    High cholesterol        Past Surgical History:   Procedure Laterality Date    COLONOSCOPY         Family History   Problem Relation Age of Onset    Hypertension Mother     No Known Problems Father     No Known Problems Sister     No Known Problems Brother     No Known Problems Maternal Aunt     No Known Problems Maternal Uncle     No Known Problems Paternal Aunt     No Known Problems Paternal Uncle     No Known Problems Maternal Grandmother     No Known Problems Maternal Grandfather     No Known Problems Paternal Grandmother     No Known Problems Paternal Grandfather     ADD / ADHD Neg Hx     Anesthesia problems Neg Hx     Cancer Neg Hx     Clotting disorder Neg Hx     Collagen disease Neg Hx     Diabetes Neg Hx     Dislocations Neg Hx     Learning disabilities Neg Hx     Neurological problems Neg Hx     Osteoporosis Neg Hx     Rheumatologic disease Neg Hx     Scoliosis Neg Hx     Vascular Disease Neg Hx      I have reviewed and agree with the history as documented  E-Cigarette/Vaping    E-Cigarette Use Never User      E-Cigarette/Vaping Substances    Nicotine No     THC No     CBD No     Flavoring No     Other No     Unknown No      Social History     Tobacco Use    Smoking status: Never Smoker    Smokeless tobacco: Never Used   Vaping Use    Vaping Use: Never used   Substance Use Topics    Alcohol use: Yes    Drug use: No        Review of Systems   Constitutional: Negative for chills, fatigue and fever  HENT: Negative for congestion and rhinorrhea  Eyes: Negative for visual disturbance  Respiratory: Negative for cough and shortness of breath  Cardiovascular: Negative for chest pain  Gastrointestinal: Positive for abdominal pain, nausea and vomiting  Negative for abdominal distention, constipation and diarrhea  Endocrine: Negative for polyuria     Genitourinary: Negative for dysuria, flank pain and hematuria  Musculoskeletal: Negative for arthralgias  Skin: Negative for rash  Neurological: Negative for syncope, weakness, light-headedness and numbness  Psychiatric/Behavioral: Negative for confusion  All other systems reviewed and are negative  Physical Exam  ED Triage Vitals   Temperature Pulse Respirations Blood Pressure SpO2   01/10/22 1532 01/10/22 1532 01/10/22 1532 01/10/22 1533 01/10/22 1532   98 3 °F (36 8 °C) 70 18 150/82 99 %      Temp Source Heart Rate Source Patient Position - Orthostatic VS BP Location FiO2 (%)   01/10/22 1532 01/10/22 1532 01/10/22 1532 01/10/22 1532 --   Oral Monitor Sitting Right arm       Pain Score       01/10/22 1532       10 - Worst Possible Pain             Orthostatic Vital Signs  Vitals:    01/10/22 1532 01/10/22 1533 01/10/22 1824   BP:  150/82 144/88   Pulse: 70  78   Patient Position - Orthostatic VS: Sitting  Lying       Physical Exam  Vitals and nursing note reviewed  Constitutional:       General: He is not in acute distress  Appearance: He is well-developed  He is not ill-appearing, toxic-appearing or diaphoretic  HENT:      Head: Normocephalic and atraumatic  Nose: No congestion or rhinorrhea  Eyes:      General:         Right eye: No discharge  Left eye: No discharge  Cardiovascular:      Rate and Rhythm: Normal rate and regular rhythm  Pulmonary:      Effort: Pulmonary effort is normal  No respiratory distress  Breath sounds: Normal breath sounds  No wheezing or rales  Abdominal:      General: Bowel sounds are normal       Palpations: Abdomen is soft  Tenderness: There is abdominal tenderness in the right upper quadrant  There is no right CVA tenderness, left CVA tenderness, guarding or rebound  Positive signs include García's sign  Musculoskeletal:      Cervical back: No rigidity  Right lower leg: No edema  Left lower leg: No edema  Skin:     General: Skin is warm        Capillary Refill: Capillary refill takes less than 2 seconds  Neurological:      Mental Status: He is alert and oriented to person, place, and time     Psychiatric:         Mood and Affect: Mood normal          Behavior: Behavior normal          ED Medications  Medications   ondansetron (ZOFRAN) injection 4 mg (4 mg Intravenous Given 1/10/22 1823)   fentanyl citrate (PF) 100 MCG/2ML 50 mcg (50 mcg Intravenous Given 1/10/22 1826)   sodium chloride 0 9 % bolus 1,000 mL (0 mL Intravenous Stopped 1/10/22 1944)       Diagnostic Studies  Results Reviewed     Procedure Component Value Units Date/Time    Lipase [[id number]]  (Normal) Collected: 01/10/22 1822    Lab Status: Final result Specimen: Blood from Arm, Right Updated: 01/10/22 1854     Lipase 88 u/L     Comprehensive metabolic panel [[id number]]  (Abnormal) Collected: 01/10/22 1822    Lab Status: Final result Specimen: Blood from Arm, Right Updated: 01/10/22 1854     Sodium 136 mmol/L      Potassium 3 6 mmol/L      Chloride 98 mmol/L      CO2 29 mmol/L      ANION GAP 9 mmol/L      BUN 10 mg/dL      Creatinine 1 08 mg/dL      Glucose 132 mg/dL      Calcium 8 3 mg/dL      AST 20 U/L      ALT 44 U/L      Alkaline Phosphatase 94 U/L      Total Protein 7 7 g/dL      Albumin 3 5 g/dL      Total Bilirubin 0 74 mg/dL      eGFR 79 ml/min/1 73sq m     Narrative:      [place] guidelines for Chronic Kidney Disease (CKD):     Stage 1 with normal or high GFR (GFR > 90 mL/min/1 73 square meters)    Stage 2 Mild CKD (GFR = 60-89 mL/min/1 73 square meters)    Stage 3A Moderate CKD (GFR = 45-59 mL/min/1 73 square meters)    Stage 3B Moderate CKD (GFR = 30-44 mL/min/1 73 square meters)    Stage 4 Severe CKD (GFR = 15-29 mL/min/1 73 square meters)    Stage 5 End Stage CKD (GFR <15 mL/min/1 73 square meters)  Note: GFR calculation is accurate only with a steady state creatinine    CBC and differential [[id number]]  (Abnormal) Collected: 01/10/22 1822    Lab Status: Final result Specimen: Blood from Arm, Right Updated: 01/10/22 1830     WBC 15 07 Thousand/uL      RBC 5 23 Million/uL      Hemoglobin 15 4 g/dL      Hematocrit 46 0 %      MCV 88 fL      MCH 29 4 pg      MCHC 33 5 g/dL      RDW 13 3 %      MPV 9 1 fL      Platelets 878 Thousands/uL      nRBC 0 /100 WBCs      Neutrophils Relative 82 %      Immat GRANS % 1 %      Lymphocytes Relative 12 %      Monocytes Relative 5 %      Eosinophils Relative 0 %      Basophils Relative 0 %      Neutrophils Absolute 12 40 Thousands/µL      Immature Grans Absolute 0 09 Thousand/uL      Lymphocytes Absolute 1 74 Thousands/µL      Monocytes Absolute 0 81 Thousand/µL      Eosinophils Absolute 0 00 Thousand/µL      Basophils Absolute 0 03 Thousands/µL                  US right upper quadrant   Final Result by Cheyenne Castillo DO (01/10 2010)      Gallbladder wall thickening ,multiple gallstones, dilation of the common bile duct likely represents acute calculus cholecystitis, although a sonographic [place] sign was unable to be assessed           Workstation performed: [id number]               Procedures  POC Biliary US    Date/Time: 1/10/2022 6:34 PM  Performed by: Kelly Lopez DO  Authorized by: Kelly Lopez DO     Patient location:  ED  Performed by:  Resident  Procedure details:     Exam Type:  Diagnostic    Indications: upper right quadrant abdominal pain      Assessment for:  Cholecystitis and cholelithiasis    Views obtained: gallbladder (transverse and longitudinal)      Image quality: diagnostic      Image availability:  Images available in PACS  Findings:     Cholelithiasis: identified      Common bile duct:  Unable to visualize    Gallbladder wall:  Normal    Gallbladder wall thickness (mm):  3    Pericholecystic fluid: identified      Sonographic García's sign: positive      Polyps: not identified      Mass: not identified    Interpretation:     Biliary ultrasound impressions: cholecystitis            ED Course  ED Course as of 01/10/22 2025   Mon Satish 10, 2022   1834 WBC(!): 15 07                                       MDM  Number of Diagnoses or Management Options  Acute calculous cholecystitis  Diagnosis management comments:   49 y o  male presenting for abdominal pain  Will order CBC, CMP, lipase and RUQ US to evaluate for biliary disease, cholecystitis or pancreatitis  Will also treat symptomatically in ED  Due to acute cholecystitis, will admit for further evaluation and management  Patient care discussed with surgery resident who will assume care and admit patient to the hospital  I have discussed with the patient our recommendation of inpatient admission for further medical care  I have answered all of the patient's questions and concerns  The patient is in agreement with the plan to proceed with admission to the hospital         Amount and/or Complexity of Data Reviewed  Clinical lab tests: ordered and reviewed  Tests in the radiology section of CPT®: ordered and reviewed  Review and summarize past medical records: yes    Patient Progress  Patient progress: stable      Disposition  Final diagnoses:   Acute calculous cholecystitis     Time reflects when diagnosis was documented in both MDM as applicable and the Disposition within this note     Time User Action Codes Description Comment    1/10/2022  8:23 PM Nikki Vitale Add [K80 00] Acute calculous cholecystitis       ED Disposition     ED Disposition Condition Date/Time Comment    Admit Stable Mon Satish 10, 2022  8:23 PM Case was discussed with surgery resident and the patient's admission status was agreed to be Admission Status: observation status to the service of Dr Nolen Duverney  Follow-up Information    None         Patient's Medications   Discharge Prescriptions    No medications on file     No discharge procedures on file  PDMP Review     None           ED Provider  Attending physically available and evaluated Sony Martínez I managed the patient along with the ED Attending      Electronically Signed by         Evaristo Villafuerte DO  01/10/22 4348

## 2022-01-11 ENCOUNTER — ANESTHESIA (OUTPATIENT)
Dept: PERIOP | Facility: HOSPITAL | Age: 52
End: 2022-01-11
Payer: COMMERCIAL

## 2022-01-11 ENCOUNTER — ANESTHESIA EVENT (OUTPATIENT)
Dept: PERIOP | Facility: HOSPITAL | Age: 52
End: 2022-01-11
Payer: COMMERCIAL

## 2022-01-11 PROBLEM — K81.0 ACUTE CHOLECYSTITIS: Status: ACTIVE | Noted: 2022-01-11

## 2022-01-11 LAB
ALBUMIN SERPL BCP-MCNC: 3.2 G/DL (ref 3.5–5)
ALP SERPL-CCNC: 90 U/L (ref 46–116)
ALT SERPL W P-5'-P-CCNC: 38 U/L (ref 12–78)
ANION GAP SERPL CALCULATED.3IONS-SCNC: 15 MMOL/L (ref 4–13)
AST SERPL W P-5'-P-CCNC: 28 U/L (ref 5–45)
BILIRUB SERPL-MCNC: 1.05 MG/DL (ref 0.2–1)
BUN SERPL-MCNC: 9 MG/DL (ref 5–25)
CALCIUM ALBUM COR SERPL-MCNC: 8.9 MG/DL (ref 8.3–10.1)
CALCIUM SERPL-MCNC: 8.3 MG/DL (ref 8.3–10.1)
CHLORIDE SERPL-SCNC: 99 MMOL/L (ref 100–108)
CO2 SERPL-SCNC: 22 MMOL/L (ref 21–32)
CREAT SERPL-MCNC: 1.12 MG/DL (ref 0.6–1.3)
ERYTHROCYTE [DISTWIDTH] IN BLOOD BY AUTOMATED COUNT: 13.8 % (ref 11.6–15.1)
GFR SERPL CREATININE-BSD FRML MDRD: 76 ML/MIN/1.73SQ M
GLUCOSE SERPL-MCNC: 111 MG/DL (ref 65–140)
HCT VFR BLD AUTO: 44.6 % (ref 36.5–49.3)
HGB BLD-MCNC: 14.8 G/DL (ref 12–17)
MCH RBC QN AUTO: 29.1 PG (ref 26.8–34.3)
MCHC RBC AUTO-ENTMCNC: 33.2 G/DL (ref 31.4–37.4)
MCV RBC AUTO: 88 FL (ref 82–98)
PLATELET # BLD AUTO: 348 THOUSANDS/UL (ref 149–390)
PMV BLD AUTO: 9.7 FL (ref 8.9–12.7)
POTASSIUM SERPL-SCNC: 3.9 MMOL/L (ref 3.5–5.3)
PROT SERPL-MCNC: 7.2 G/DL (ref 6.4–8.2)
RBC # BLD AUTO: 5.09 MILLION/UL (ref 3.88–5.62)
SODIUM SERPL-SCNC: 136 MMOL/L (ref 136–145)
WBC # BLD AUTO: 15.07 THOUSAND/UL (ref 4.31–10.16)

## 2022-01-11 PROCEDURE — 88304 TISSUE EXAM BY PATHOLOGIST: CPT | Performed by: PATHOLOGY

## 2022-01-11 PROCEDURE — 47562 LAPAROSCOPIC CHOLECYSTECTOMY: CPT | Performed by: SURGERY

## 2022-01-11 PROCEDURE — 80053 COMPREHEN METABOLIC PANEL: CPT | Performed by: SURGERY

## 2022-01-11 PROCEDURE — 85027 COMPLETE CBC AUTOMATED: CPT | Performed by: SURGERY

## 2022-01-11 PROCEDURE — 99024 POSTOP FOLLOW-UP VISIT: CPT | Performed by: SURGERY

## 2022-01-11 RX ORDER — GLYCOPYRROLATE 0.2 MG/ML
INJECTION INTRAMUSCULAR; INTRAVENOUS AS NEEDED
Status: DISCONTINUED | OUTPATIENT
Start: 2022-01-11 | End: 2022-01-11

## 2022-01-11 RX ORDER — FENTANYL CITRATE/PF 50 MCG/ML
25 SYRINGE (ML) INJECTION
Status: DISCONTINUED | OUTPATIENT
Start: 2022-01-11 | End: 2022-01-11 | Stop reason: HOSPADM

## 2022-01-11 RX ORDER — ONDANSETRON 2 MG/ML
INJECTION INTRAMUSCULAR; INTRAVENOUS AS NEEDED
Status: DISCONTINUED | OUTPATIENT
Start: 2022-01-11 | End: 2022-01-11

## 2022-01-11 RX ORDER — HYDROMORPHONE HCL/PF 1 MG/ML
0.5 SYRINGE (ML) INJECTION
Status: DISCONTINUED | OUTPATIENT
Start: 2022-01-11 | End: 2022-01-11 | Stop reason: HOSPADM

## 2022-01-11 RX ORDER — ROCURONIUM BROMIDE 10 MG/ML
INJECTION, SOLUTION INTRAVENOUS AS NEEDED
Status: DISCONTINUED | OUTPATIENT
Start: 2022-01-11 | End: 2022-01-11

## 2022-01-11 RX ORDER — ONDANSETRON 2 MG/ML
4 INJECTION INTRAMUSCULAR; INTRAVENOUS ONCE AS NEEDED
Status: DISCONTINUED | OUTPATIENT
Start: 2022-01-11 | End: 2022-01-11 | Stop reason: HOSPADM

## 2022-01-11 RX ORDER — DEXAMETHASONE SODIUM PHOSPHATE 4 MG/ML
INJECTION, SOLUTION INTRA-ARTICULAR; INTRALESIONAL; INTRAMUSCULAR; INTRAVENOUS; SOFT TISSUE AS NEEDED
Status: DISCONTINUED | OUTPATIENT
Start: 2022-01-11 | End: 2022-01-11

## 2022-01-11 RX ORDER — PROPOFOL 10 MG/ML
INJECTION, EMULSION INTRAVENOUS AS NEEDED
Status: DISCONTINUED | OUTPATIENT
Start: 2022-01-11 | End: 2022-01-11

## 2022-01-11 RX ORDER — MAGNESIUM HYDROXIDE 1200 MG/15ML
LIQUID ORAL AS NEEDED
Status: DISCONTINUED | OUTPATIENT
Start: 2022-01-11 | End: 2022-01-11 | Stop reason: HOSPADM

## 2022-01-11 RX ORDER — FENTANYL CITRATE 50 UG/ML
INJECTION, SOLUTION INTRAMUSCULAR; INTRAVENOUS AS NEEDED
Status: DISCONTINUED | OUTPATIENT
Start: 2022-01-11 | End: 2022-01-11

## 2022-01-11 RX ORDER — NEOSTIGMINE METHYLSULFATE 1 MG/ML
INJECTION INTRAVENOUS AS NEEDED
Status: DISCONTINUED | OUTPATIENT
Start: 2022-01-11 | End: 2022-01-11

## 2022-01-11 RX ORDER — BUPIVACAINE HYDROCHLORIDE 2.5 MG/ML
INJECTION, SOLUTION EPIDURAL; INFILTRATION; INTRACAUDAL AS NEEDED
Status: DISCONTINUED | OUTPATIENT
Start: 2022-01-11 | End: 2022-01-11 | Stop reason: HOSPADM

## 2022-01-11 RX ORDER — MIDAZOLAM HYDROCHLORIDE 2 MG/2ML
INJECTION, SOLUTION INTRAMUSCULAR; INTRAVENOUS AS NEEDED
Status: DISCONTINUED | OUTPATIENT
Start: 2022-01-11 | End: 2022-01-11

## 2022-01-11 RX ADMIN — PIPERACILLIN AND TAZOBACTAM 3.38 G: 3; .375 INJECTION, POWDER, LYOPHILIZED, FOR SOLUTION INTRAVENOUS at 14:13

## 2022-01-11 RX ADMIN — ONDANSETRON 4 MG: 2 INJECTION INTRAMUSCULAR; INTRAVENOUS at 14:57

## 2022-01-11 RX ADMIN — FENTANYL CITRATE 50 MCG: 50 INJECTION INTRAMUSCULAR; INTRAVENOUS at 15:27

## 2022-01-11 RX ADMIN — SODIUM CHLORIDE, SODIUM LACTATE, POTASSIUM CHLORIDE, AND CALCIUM CHLORIDE: .6; .31; .03; .02 INJECTION, SOLUTION INTRAVENOUS at 15:28

## 2022-01-11 RX ADMIN — GLYCOPYRROLATE 0.4 MG: 0.2 INJECTION, SOLUTION INTRAMUSCULAR; INTRAVENOUS at 16:20

## 2022-01-11 RX ADMIN — PROPOFOL 200 MG: 10 INJECTION, EMULSION INTRAVENOUS at 14:31

## 2022-01-11 RX ADMIN — OXYCODONE HYDROCHLORIDE 10 MG: 10 TABLET ORAL at 12:53

## 2022-01-11 RX ADMIN — HEPARIN SODIUM 5000 UNITS: 5000 INJECTION INTRAVENOUS; SUBCUTANEOUS at 23:37

## 2022-01-11 RX ADMIN — NEOSTIGMINE METHYLSULFATE 3 MG: 1 INJECTION INTRAVENOUS at 16:20

## 2022-01-11 RX ADMIN — OXYCODONE HYDROCHLORIDE 10 MG: 10 TABLET ORAL at 08:12

## 2022-01-11 RX ADMIN — DEXAMETHASONE SODIUM PHOSPHATE 4 MG: 4 INJECTION INTRA-ARTICULAR; INTRALESIONAL; INTRAMUSCULAR; INTRAVENOUS; SOFT TISSUE at 14:57

## 2022-01-11 RX ADMIN — OXYCODONE HYDROCHLORIDE 5 MG: 5 TABLET ORAL at 02:49

## 2022-01-11 RX ADMIN — OXYCODONE HYDROCHLORIDE 5 MG: 5 TABLET ORAL at 23:41

## 2022-01-11 RX ADMIN — FENTANYL CITRATE 100 MCG: 50 INJECTION INTRAMUSCULAR; INTRAVENOUS at 14:29

## 2022-01-11 RX ADMIN — SODIUM CHLORIDE, SODIUM LACTATE, POTASSIUM CHLORIDE, AND CALCIUM CHLORIDE 130 ML/HR: .6; .31; .03; .02 INJECTION, SOLUTION INTRAVENOUS at 21:10

## 2022-01-11 RX ADMIN — MIDAZOLAM 2 MG: 1 INJECTION INTRAMUSCULAR; INTRAVENOUS at 14:26

## 2022-01-11 RX ADMIN — PIPERACILLIN AND TAZOBACTAM 3.38 G: 3; .375 INJECTION, POWDER, LYOPHILIZED, FOR SOLUTION INTRAVENOUS at 23:37

## 2022-01-11 RX ADMIN — PIPERACILLIN AND TAZOBACTAM 3.38 G: 3; .375 INJECTION, POWDER, LYOPHILIZED, FOR SOLUTION INTRAVENOUS at 09:12

## 2022-01-11 RX ADMIN — ROCURONIUM BROMIDE 50 MG: 50 INJECTION, SOLUTION INTRAVENOUS at 14:31

## 2022-01-11 RX ADMIN — ONDANSETRON 4 MG: 2 INJECTION INTRAMUSCULAR; INTRAVENOUS at 15:27

## 2022-01-11 RX ADMIN — SODIUM CHLORIDE, SODIUM LACTATE, POTASSIUM CHLORIDE, AND CALCIUM CHLORIDE 130 ML/HR: .6; .31; .03; .02 INJECTION, SOLUTION INTRAVENOUS at 05:08

## 2022-01-11 RX ADMIN — SODIUM CHLORIDE, SODIUM LACTATE, POTASSIUM CHLORIDE, AND CALCIUM CHLORIDE: .6; .31; .03; .02 INJECTION, SOLUTION INTRAVENOUS at 15:52

## 2022-01-11 RX ADMIN — PIPERACILLIN AND TAZOBACTAM 3.38 G: 3; .375 INJECTION, POWDER, LYOPHILIZED, FOR SOLUTION INTRAVENOUS at 05:06

## 2022-01-11 RX ADMIN — LIDOCAINE HYDROCHLORIDE 100 MG: 20 INJECTION INTRAVENOUS at 14:31

## 2022-01-11 RX ADMIN — SODIUM CHLORIDE, SODIUM LACTATE, POTASSIUM CHLORIDE, AND CALCIUM CHLORIDE 130 ML/HR: .6; .31; .03; .02 INJECTION, SOLUTION INTRAVENOUS at 14:06

## 2022-01-11 RX ADMIN — FENTANYL CITRATE 50 MCG: 50 INJECTION INTRAMUSCULAR; INTRAVENOUS at 14:50

## 2022-01-11 RX ADMIN — DEXAMETHASONE SODIUM PHOSPHATE 4 MG: 4 INJECTION INTRA-ARTICULAR; INTRALESIONAL; INTRAMUSCULAR; INTRAVENOUS; SOFT TISSUE at 15:27

## 2022-01-11 NOTE — ED ATTENDING ATTESTATION
1/10/2022  IKandy DO, saw and evaluated the patient  I have discussed the patient with the resident/non-physician practitioner and agree with the resident's/non-physician practitioner's findings, Plan of Care, and MDM as documented in the resident's/non-physician practitioner's note, except where noted  All available labs and Radiology studies were reviewed  I was present for key portions of any procedure(s) performed by the resident/non-physician practitioner and I was immediately available to provide assistance  At this point I agree with the current assessment done in the Emergency Department  I have conducted an independent evaluation of this patient a history and physical is as follows:    ED Course         Critical Care Time  Procedures    43-year-old male presents to the ED with right upper abdominal pain that started earlier today associated with 2 episodes of vomiting  No fevers or chills  On exam he appears uncomfortable but otherwise is in no acute distress  Heart is regular without murmur  Lungs are clear  Abdomen is tender with guarding in the right upper quadrant  Normal bowel sounds  No hernia  Skin is warm and dry, normal color no rash  Will obtain CBC, CMP, lipase  Suspect biliary colic versus acute cholecystitis    Will ultrasound right upper quadrant

## 2022-01-11 NOTE — QUICK NOTE
Postoperative check  Patient seen following laparoscopic cholecystectomy for acute cholecystitis, drain left intraoperatively due to concerns over inflamed and partially necrotic appearing cystic duct  VSS, afebrile    Pain well controlled, no nausea or vomiting, returned to room from PACU recently  Abdomen soft, appropriately tender, minimally distended, incisions C/D/I, right-sided abdominal TATUM drain with minimal serosanguineous effluent and bulb    -diet as tolerated  -discontinue IV fluids with adequate p o  Intake  -monitor TATUM drain output  -trend a m   Labs  -out of bed as able  -pain control  -DVT prophylaxis  -disposition planning

## 2022-01-11 NOTE — ANESTHESIA PREPROCEDURE EVALUATION
Procedure:  CHOLECYSTECTOMY LAPAROSCOPIC (N/A Abdomen)    Relevant Problems   CARDIO   (+) Mixed hyperlipidemia        Physical Exam    Airway    Mallampati score: III  TM Distance: >3 FB  Neck ROM: full     Dental       Cardiovascular  Rhythm: regular, Rate: normal,     Pulmonary  Breath sounds clear to auscultation,     Other Findings        Anesthesia Plan  ASA Score- 2 Emergent    Anesthesia Type- general with ASA Monitors  Additional Monitors:   Airway Plan:           Plan Factors-Exercise tolerance (METS): >4 METS  Chart reviewed  Existing labs reviewed  Patient summary reviewed  Patient is not a current smoker  Patient not instructed to abstain from smoking on day of procedure  Patient did not smoke on day of surgery  Obstructive sleep apnea risk education given perioperatively  Induction- intravenous  Postoperative Plan- Plan for postoperative opioid use  Informed Consent- Anesthetic plan and risks discussed with patient

## 2022-01-11 NOTE — UTILIZATION REVIEW
Initial Clinical Review    Admission: Date/Time/Statement:   Admission Orders (From admission, onward)     Ordered        01/10/22 2028  Place in Observation  Once                      Orders Placed This Encounter   Procedures    Place in Observation     Standing Status:   Standing     Number of Occurrences:   1     Order Specific Question:   Level of Care     Answer:   Med Surg [16]     ED Arrival Information     Expected Arrival Acuity    - 1/10/2022 15:10 Urgent         Means of arrival Escorted by Service Admission type    Wheelchair Self Surgery-General Urgent         Arrival complaint    abdominal pain        Chief Complaint   Patient presents with    Abdominal Pain     Upper abdominal pain starting early this morning  Vomiting  Initial Presentation: 49 y o  male with history of hyperlipidemia without surgical history who presents with abdominal pain  The pain started yesterday in RUQ, which accompanied with nausea and vomiting  Denies fever, diarrhea, or constipation  His lab showed elevated WBC and normal LFT  RUQ US showed cholelithiasis with gallbladder wall thickening  General surgery was consulted  ADMIT OBSERVATION STATUS        ED Triage Vitals   Temperature Pulse Respirations Blood Pressure SpO2   01/10/22 1532 01/10/22 1532 01/10/22 1532 01/10/22 1533 01/10/22 1532   98 3 °F (36 8 °C) 70 18 150/82 99 %      Temp Source Heart Rate Source Patient Position - Orthostatic VS BP Location FiO2 (%)   01/10/22 1532 01/10/22 1532 01/10/22 1532 01/10/22 1532 --   Oral Monitor Sitting Right arm       Pain Score       01/10/22 1532       10 - Worst Possible Pain          Wt Readings from Last 1 Encounters:   01/10/22 90 7 kg (200 lb)     Additional Vital Signs:   Date/Time Temp Pulse Resp BP MAP (mmHg) SpO2 O2 Device Patient Position - Orthostatic VS   01/10/22 2300 98 1 °F (36 7 °C) 81 20 131/67 -- 97 % None (Room air) Lying   01/10/22 2127 97 8 °F (36 6 °C) 88 18 129/71 94 95 % None (Room air) Lying 01/10/22 2114 -- 94 -- -- -- 97 % None (Room air) --   01/10/22 2040 -- 95 18 124/69 -- 96 % None (Room air) Lying   01/10/22 1825 -- -- -- -- -- -- None (Room air) --   01/10/22 1824 -- 78 17 144/88 -- 98 % None (Room air) Lying     Pertinent Labs/Diagnostic Test Results:   1/10 US RUQ:  Gallbladder wall thickening ,multiple gallstones, dilation of the common bile duct likely represents acute calculus cholecystitis, although a sonographic [place] sign was unable to be assessed        Results from last 7 days   Lab Units 01/11/22 0430 01/10/22  1822   WBC Thousand/uL 15 07* 15 07*   HEMOGLOBIN g/dL 14 8 15 4   HEMATOCRIT % 44 6 46 0   PLATELETS Thousands/uL 348 326   NEUTROS ABS Thousands/µL  --  12 40*     Results from last 7 days   Lab Units 01/11/22  0430 01/10/22  1822   SODIUM mmol/L 136 136   POTASSIUM mmol/L 3 9 3 6   CHLORIDE mmol/L 99* 98*   CO2 mmol/L 22 29   ANION GAP mmol/L 15* 9   BUN mg/dL 9 10   CREATININE mg/dL 1 12 1 08   EGFR ml/min/1 73sq m 76 79   CALCIUM mg/dL 8 3 8 3     Results from last 7 days   Lab Units 01/11/22  0430 01/10/22  1822   AST U/L 28 20   ALT U/L 38 44   ALK PHOS U/L 90 94   TOTAL PROTEIN g/dL 7 2 7 7   ALBUMIN g/dL 3 2* 3 5   TOTAL BILIRUBIN mg/dL 1 05* 0 74     Results from last 7 days   Lab Units 01/11/22  0430 01/10/22  1822   GLUCOSE RANDOM mg/dL 111 132     Results from last 7 days   Lab Units 01/10/22  1822   LIPASE u/L 88     ED Treatment:   Medication Administration from 01/10/2022 1510 to 01/10/2022 2122       Date/Time Order Dose Route Action     01/10/2022 1823 ondansetron (ZOFRAN) injection 4 mg 4 mg Intravenous Given     01/10/2022 1826 fentanyl citrate (PF) 100 MCG/2ML 50 mcg 50 mcg Intravenous Given     01/10/2022 1829 sodium chloride 0 9 % bolus 1,000 mL 1,000 mL Intravenous New Bag        Past Medical History:   Diagnosis Date    Elevated cholesterol     Fingertip amputation     right 5th    High cholesterol      Present on Admission:  **None**      Admitting Diagnosis: Abdominal pain [R10 9]  Acute calculous cholecystitis [K80 00]  Age/Sex: 49 y o  male  Admission Orders:  Scheduled Medications:  heparin (porcine), 5,000 Units, Subcutaneous, Q8H [address]  piperacillin-tazobactam, 3 375 g, Intravenous, Q6H      Continuous IV Infusions:  lactated ringers, 130 mL/hr, Intravenous, Continuous      PRN Meds:  HYDROmorphone, 0 5 mg, Intravenous, Q3H PRN  ondansetron, 4 mg, Oral, Q6H PRN  oxyCODONE, 10 mg, Oral, Q4H PRN X1 THUS FAR  oxyCODONE, 5 mg, Oral, Q4H PRN X1 THUS FAR      Network Utilization Review Department  ATTENTION: Please call with any questions or concerns to [phone] and carefully listen to the prompts so that you are directed to the right person  All voicemails are confidential   Akila Esquivel all requests for admission clinical reviews, approved or denied determinations and any other requests to dedicated fax number below belonging to the campus where the patient is receiving treatment   List of dedicated fax numbers for the Facilities:  [address] DENIALS (Administrative/Medical Necessity) [phone]   [address] (Maternity/NICU/Pediatrics) [phone]   [address]  [address] [address] [place] [address] [address] [facility] [phone]

## 2022-01-11 NOTE — H&P
Plan for laparoscopic cholecystectomy today  The risks benefits and alternatives were explained to him is agreeable to proceed

## 2022-01-11 NOTE — H&P
H&P Exam - General Surgery   Sony Martínez 49 y o  male MRN: [id number]  Unit/Bed#: ED 15 Encounter: [id number]    Assessment/Plan     Assessment:  48 yo M with acute cholecystitis    Plan:  Admit to surgery  NPO  IVF  IV abx   Pain management  Will schedule dejon hayes today    History of Present Illness     HPI:  Lashon Davidson is a 49 y o  male with history of hyperlipidemia without surgical history who presents with abdominal pain  The pain started yesterday in RUQ, which accompanied with nausea and vomiting  Denies fever, diarrhea, or constipation  His lab showed elevated WBC and normal LFT  RUQ US showed cholelithiasis with gallbladder wall thickening  General surgery was consulted    Review of Systems   Constitutional: Negative  HENT: Negative  Eyes: Negative  Respiratory: Negative  Cardiovascular: Negative  Gastrointestinal: Positive for abdominal pain, nausea and vomiting  Endocrine: Negative  Genitourinary: Negative  Musculoskeletal: Negative  Skin: Negative  Allergic/Immunologic: Negative  Neurological: Negative  Hematological: Negative  Psychiatric/Behavioral: Negative  All other systems reviewed and are negative        Historical Information   Past Medical History:   Diagnosis Date    Elevated cholesterol     Fingertip amputation     right 5th    High cholesterol      Past Surgical History:   Procedure Laterality Date    COLONOSCOPY       Social History   Social History     Substance and Sexual Activity   Alcohol Use Yes     Social History     Substance and Sexual Activity   Drug Use No     Social History     Tobacco Use   Smoking Status Never Smoker   Smokeless Tobacco Never Used     E-Cigarette/Vaping    E-Cigarette Use Never User      E-Cigarette/Vaping Substances    Nicotine No     THC No     CBD No     Flavoring No     Other No     Unknown No      Family History: non-contributory    Meds/Allergies   all medications and allergies reviewed  No Known Allergies    Objective   First Vitals:   Blood Pressure: 150/82 (01/10/22 1533)  Pulse: 70 (01/10/22 1532)  Temperature: 98 3 °F (36 8 °C) (01/10/22 1532)  Temp Source: Oral (01/10/22 1532)  Respirations: 18 (01/10/22 1532)  SpO2: 99 % (01/10/22 1532)    Current Vitals:   Blood Pressure: 144/88 (01/10/22 1824)  Pulse: 78 (01/10/22 1824)  Temperature: 98 3 °F (36 8 °C) (01/10/22 1532)  Temp Source: Oral (01/10/22 1532)  Respirations: 17 (01/10/22 1824)  SpO2: 98 % (01/10/22 1824)      Intake/Output Summary (Last 24 hours) at 1/10/2022 2039  Last data filed at 1/10/2022 1944  Gross per 24 hour   Intake 1000 ml   Output --   Net 1000 ml       Invasive Devices  Report    Peripheral Intravenous Line            Peripheral IV 01/10/22 Right Antecubital <1 day                Physical Exam  Vitals reviewed  Constitutional:       Appearance: He is normal weight  HENT:      Head: Normocephalic  Right Ear: External ear normal       Left Ear: External ear normal       Nose: Nose normal       Mouth/Throat:      Mouth: Mucous membranes are moist       Pharynx: Oropharynx is clear  Eyes:      Extraocular Movements: Extraocular movements intact  Conjunctiva/sclera: Conjunctivae normal       Pupils: Pupils are equal, round, and reactive to light  Cardiovascular:      Rate and Rhythm: Normal rate and regular rhythm  Pulses: Normal pulses  Heart sounds: Normal heart sounds  Pulmonary:      Effort: Pulmonary effort is normal       Breath sounds: Normal breath sounds  Abdominal:      General: Abdomen is flat  Bowel sounds are normal       Palpations: Abdomen is soft  Tenderness: There is abdominal tenderness in the right upper quadrant  Musculoskeletal:         General: Normal range of motion  Cervical back: Normal range of motion and neck supple  Skin:     General: Skin is warm  Capillary Refill: Capillary refill takes less than 2 seconds     Neurological:      General: No focal deficit present  Mental Status: He is alert and oriented to person, place, and time  Mental status is at baseline  Psychiatric:         Mood and Affect: Mood normal          Behavior: Behavior normal          Thought Content: Thought content normal          Judgment: Judgment normal          Lab Results: I have personally reviewed pertinent lab results  Imaging: I have personally reviewed pertinent reports  EKG, Pathology, and Other Studies: I have personally reviewed pertinent reports  Code Status: Level 1 - Full Code  Advance Directive and Living Will:      Power of :    POLST:      Counseling / Coordination of Care  Total floor / unit time spent today 30 minutes  Greater than 50% of total time was spent with the patient and / or family counseling and / or coordination of care    A description of the counseling / coordination of care:

## 2022-01-11 NOTE — ANESTHESIA POSTPROCEDURE EVALUATION
Post-Op Assessment Note    CV Status:  Stable  Pain Score: 1    Pain management: adequate     Mental Status:  Alert and awake   Hydration Status:  Euvolemic   PONV Controlled:  Controlled   Airway Patency:  Patent      Post Op Vitals Reviewed: Yes      Staff: Anesthesiologist         No complications documented      /62 (01/11/22 1705)    Temp      Pulse 86 (01/11/22 1705)   Resp 16 (01/11/22 1705)    SpO2 93 % (01/11/22 1708)

## 2022-01-11 NOTE — OP NOTE
PERATIVE REPORT  PATIENT NAME: Sony Martínez    :  1970  MRN: [id number]  Pt Location: [facility] OR ROOM 08    SURGERY DATE: 2022    Surgeon(s) and Role:     * Manny Murillo MD - Primary    Preop Diagnosis:  Acute calculous cholecystitis [K80 00]    Post-Op Diagnosis Codes:     * Acute gangrenous cholecystitis [K81 0]    Procedure(s) (LRB):  CHOLECYSTECTOMY LAPAROSCOPIC (N/A)    Specimen(s):  ID Type Source Tests Collected by Time Destination   1 : GALLBLADDER Tissue Gallbladder TISSUE EXAM Manny Murillo MD 2022 [id number]        Estimated Blood Loss:   20 mL    Drains:  Closed/Suction Drain Abdomen Bulb (Active)   Number of days: 0       NG/OG/Enteral Tube Orogastric 18 Fr Center mouth (Active)   Number of days: 0       Anesthesia Type:   General    Operative Indications:  Acute calculous cholecystitis [K80 00]      Operative Findings:  Extensive inflammatory response acute consistent acute cholecystitis  The base the gallbladder is his duct had early gangrenous changes to the wall  The duct was endo looped and a drain was placed in the right upper quadrant  Complications:   None    Procedure and Technique:    The patient was seen again in the Holding Room  The risks, benefits, complications, treatment options, and expected outcomes were discussed with the patient  The possibilities of reaction to medication, pulmonary aspiration, perforation of viscus, bleeding, recurrent infection, finding a normal gallbladder, the need for additional procedures, failure to diagnose a condition, the possible need to convert to an open procedure, and creating a complication requiring transfusion or operation were discussed with the patient  The patient and/or family concurred with the proposed plan, giving informed consent  The site of surgery properly noted/marked   The patient was taken to Operating Room, identified as Sony Martínez  and the procedure verified as Laparoscopic Cholecystectomy with possible Intraoperative Cholangiogram  A Time Out was held after prepping and draping in sterile fashion  The above information was confirmed  Prior to the induction of general anesthesia, antibiotic prophylaxis was administered  General endotracheal anesthesia was then administered and tolerated well  After the induction, the abdomen was prepped in the usual sterile fashion  The patient was positioned in the supine position, along with some reverse Trendelenburg  Local anesthetic agent was injected into the skin near the umbilicus and an incision made  The midline fascia was incised and the open technique was used to introduce a  port under direct vision  Pneumoperitoneum was then created with CO2 and was tolerated well without any adverse changes in the patient's vital signs  Additional trocars were introduced under direct vision  All skin incisions were infiltrated with a local anesthetic agent before making the incision and placing the trocars  The patient was placed in reverse Trendelenburg position  The gallbladder was identified, the fundus grasped and retracted cephalad  Adhesions were lysed bluntly and with the electrocautery where indicated, taking care not to injure any adjacent organs or viscus  The infundibulum was grasped and retracted laterally, exposing the peritoneum overlying the triangle of Calot  This was then dissected anteriorily and posteriorly and exposed in a blunt fashion or using cautery where appropriate  The cystic duct was clearly identified and  dissected circumferentially, as was the cystic artery, as the only two tubular structures leading into the gallbladder   The critical view of the [address] was identified  The posterior aspect of the gallbladder was lifted off the cystic plate, to insure that there were no posterior structres behind the gallbladder        Once this was all clearly identified, the cystic duct was then doubly ligated with surgical clips and/or Endoloop suture on the patient side and singly clipped on the gallbladder side and divided  The cystic artery was re-identified,  ligated with clips and divided as well  The gallbladder was dissected from the liver bed in retrograde fashion with the electrocautery  The gallbladder was placed into an endocatch bag and secured  The liver bed was irrigated and inspected  Hemostasis was achieved with the electrocautery  Copious irrigation was utilized and was repeatedly aspirated until clear  A 15 Alvaro drain was passed through the lateral trocar to secure the nylon suture  The camera was then switched to the lateral port and directed back to the midline  The specimen was removed under direct visualization from the midline incision  The fascia was then closed using the renfac suture passer and a figure of eight 0 vicryl suture  Pneumoperitoneum was completely reduced after viewing removal of the trocars under direct vision  The wound was thoroughly irrigated  The skin was then closed with 4-0 monocryl and histacryl  Instrument, sponge, and needle counts were correct at closure and at the conclusion of the case        I was present for the entire procedure    Patient Disposition:  PACU       SIGNATURE: Keagan Hernandez MD  DATE: January 11, 2022  TIME: 4:26 PM

## 2022-01-11 NOTE — PLAN OF CARE
Problem: Potential for Falls  Goal: Patient will remain free of falls  Description: INTERVENTIONS:  - Educate patient/family on patient safety including physical limitations  - Instruct patient to call for assistance with activity   - Consult OT/PT to assist with strengthening/mobility   - Keep Call bell within reach  - Keep bed low and locked with side rails adjusted as appropriate  - Keep care items and personal belongings within reach  - Initiate and maintain comfort rounds  - Make Fall Risk Sign visible to staff  Outcome: Progressing     Problem: GASTROINTESTINAL - ADULT  Goal: Minimal or absence of nausea and/or vomiting  Description: INTERVENTIONS:  - Administer IV fluids if ordered to ensure adequate hydration  - Maintain NPO status until nausea and vomiting are resolved  - Nasogastric tube if ordered  - Administer ordered antiemetic medications as needed  - Provide nonpharmacologic comfort measures as appropriate  - Advance diet as tolerated, if ordered  - Consider nutrition services referral to assist patient with adequate nutrition and appropriate food choices  Outcome: Progressing     Problem: PAIN - ADULT  Goal: Verbalizes/displays adequate comfort level or baseline comfort level  Description: Interventions:  - Encourage patient to monitor pain and request assistance  - Assess pain using appropriate pain scale  - Administer analgesics based on type and severity of pain and evaluate response  - Implement non-pharmacological measures as appropriate and evaluate response  - Consider cultural and social influences on pain and pain management  - Notify physician/advanced practitioner if interventions unsuccessful or patient reports new pain  Outcome: Progressing

## 2022-01-12 VITALS
DIASTOLIC BLOOD PRESSURE: 79 MMHG | WEIGHT: 200 LBS | HEIGHT: 65 IN | TEMPERATURE: 98.4 F | OXYGEN SATURATION: 100 % | HEART RATE: 100 BPM | RESPIRATION RATE: 18 BRPM | BODY MASS INDEX: 33.32 KG/M2 | SYSTOLIC BLOOD PRESSURE: 126 MMHG

## 2022-01-12 PROBLEM — K81.0 ACUTE CHOLECYSTITIS: Status: RESOLVED | Noted: 2022-01-11 | Resolved: 2022-01-12

## 2022-01-12 LAB
ALBUMIN SERPL BCP-MCNC: 2.6 G/DL (ref 3.5–5)
ALP SERPL-CCNC: 86 U/L (ref 46–116)
ALT SERPL W P-5'-P-CCNC: 81 U/L (ref 12–78)
ANION GAP SERPL CALCULATED.3IONS-SCNC: 7 MMOL/L (ref 4–13)
AST SERPL W P-5'-P-CCNC: 56 U/L (ref 5–45)
BASOPHILS # BLD AUTO: 0.03 THOUSANDS/ΜL (ref 0–0.1)
BASOPHILS NFR BLD AUTO: 0 % (ref 0–1)
BILIRUB SERPL-MCNC: 0.73 MG/DL (ref 0.2–1)
BUN SERPL-MCNC: 13 MG/DL (ref 5–25)
CALCIUM ALBUM COR SERPL-MCNC: 9.4 MG/DL (ref 8.3–10.1)
CALCIUM SERPL-MCNC: 8.3 MG/DL (ref 8.3–10.1)
CHLORIDE SERPL-SCNC: 102 MMOL/L (ref 100–108)
CO2 SERPL-SCNC: 27 MMOL/L (ref 21–32)
CREAT SERPL-MCNC: 1.1 MG/DL (ref 0.6–1.3)
EOSINOPHIL # BLD AUTO: 0 THOUSAND/ΜL (ref 0–0.61)
EOSINOPHIL NFR BLD AUTO: 0 % (ref 0–6)
ERYTHROCYTE [DISTWIDTH] IN BLOOD BY AUTOMATED COUNT: 14.2 % (ref 11.6–15.1)
GFR SERPL CREATININE-BSD FRML MDRD: 77 ML/MIN/1.73SQ M
GLUCOSE P FAST SERPL-MCNC: 121 MG/DL (ref 65–99)
GLUCOSE SERPL-MCNC: 121 MG/DL (ref 65–140)
HCT VFR BLD AUTO: 40.6 % (ref 36.5–49.3)
HGB BLD-MCNC: 13.1 G/DL (ref 12–17)
IMM GRANULOCYTES # BLD AUTO: 0.07 THOUSAND/UL (ref 0–0.2)
IMM GRANULOCYTES NFR BLD AUTO: 0 % (ref 0–2)
LYMPHOCYTES # BLD AUTO: 1.41 THOUSANDS/ΜL (ref 0.6–4.47)
LYMPHOCYTES NFR BLD AUTO: 8 % (ref 14–44)
MCH RBC QN AUTO: 28.7 PG (ref 26.8–34.3)
MCHC RBC AUTO-ENTMCNC: 32.3 G/DL (ref 31.4–37.4)
MCV RBC AUTO: 89 FL (ref 82–98)
MONOCYTES # BLD AUTO: 1.06 THOUSAND/ΜL (ref 0.17–1.22)
MONOCYTES NFR BLD AUTO: 6 % (ref 4–12)
NEUTROPHILS # BLD AUTO: 15.3 THOUSANDS/ΜL (ref 1.85–7.62)
NEUTS SEG NFR BLD AUTO: 86 % (ref 43–75)
NRBC BLD AUTO-RTO: 0 /100 WBCS
PLATELET # BLD AUTO: 302 THOUSANDS/UL (ref 149–390)
PMV BLD AUTO: 9.6 FL (ref 8.9–12.7)
POTASSIUM SERPL-SCNC: 3.9 MMOL/L (ref 3.5–5.3)
PROT SERPL-MCNC: 6.7 G/DL (ref 6.4–8.2)
RBC # BLD AUTO: 4.56 MILLION/UL (ref 3.88–5.62)
SODIUM SERPL-SCNC: 136 MMOL/L (ref 136–145)
WBC # BLD AUTO: 17.87 THOUSAND/UL (ref 4.31–10.16)

## 2022-01-12 PROCEDURE — 80053 COMPREHEN METABOLIC PANEL: CPT | Performed by: SURGERY

## 2022-01-12 PROCEDURE — ND001 PR NO DOCUMENTATION: Performed by: SURGERY

## 2022-01-12 PROCEDURE — 85025 COMPLETE CBC W/AUTO DIFF WBC: CPT | Performed by: SURGERY

## 2022-01-12 RX ORDER — OXYCODONE HYDROCHLORIDE AND ACETAMINOPHEN 5; 325 MG/1; MG/1
1 TABLET ORAL EVERY 4 HOURS PRN
Qty: 10 TABLET | Refills: 0 | Status: SHIPPED | OUTPATIENT
Start: 2022-01-12 | End: 2022-01-17 | Stop reason: SDUPTHER

## 2022-01-12 RX ORDER — ACETAMINOPHEN 325 MG/1
975 TABLET ORAL EVERY 6 HOURS PRN
Status: DISCONTINUED | OUTPATIENT
Start: 2022-01-12 | End: 2022-01-12 | Stop reason: HOSPADM

## 2022-01-12 RX ADMIN — PIPERACILLIN AND TAZOBACTAM 3.38 G: 3; .375 INJECTION, POWDER, LYOPHILIZED, FOR SOLUTION INTRAVENOUS at 04:40

## 2022-01-12 RX ADMIN — OXYCODONE HYDROCHLORIDE 5 MG: 5 TABLET ORAL at 03:48

## 2022-01-12 RX ADMIN — HEPARIN SODIUM 5000 UNITS: 5000 INJECTION INTRAVENOUS; SUBCUTANEOUS at 06:09

## 2022-01-12 RX ADMIN — OXYCODONE HYDROCHLORIDE 10 MG: 10 TABLET ORAL at 10:24

## 2022-01-12 RX ADMIN — PIPERACILLIN AND TAZOBACTAM 3.38 G: 3; .375 INJECTION, POWDER, LYOPHILIZED, FOR SOLUTION INTRAVENOUS at 09:13

## 2022-01-12 NOTE — DISCHARGE INSTRUCTIONS
[facility] Surgical  Post-Operative Care Instructions  Dr Keagan Hernandez MD, [facility] [place]  [phone]    1  General: You will feel pulling sensations around the wound or funny aches and pains around the incisions  This is normal  Even minor surgery is a change in your body and this is your bodys way of reaction to it  If you have had abdominal surgery, it may help to support the incision with a small pillow or blanket for comfort when moving or coughing  2  Wound care:  Okay to shower  The glue will fall off over the next week or 2     3  Water: You may shower over the wound, unless there are drain tubes left in place  Do not bathe or use a pool or hot tub until cleared by the physician  4  Activity: You may go up and down stairs, walk as much as you are comfortable, but walk at least 3 times each day  If you have had abdominal surgery, do not lift anything heavier than 20 pounds for at least 4 weeks, unless cleared by the doctor  5  Diet: You may resume a regular diet  If you had a same-day surgery or overnight stay surgery, he may wish to eat lightly for a few days: soups, crackers, and sandwiches  You may resume a regular diet when ready  6  Medications: Resume all of your previous medications, unless told otherwise by the doctor  A good option for pain control is to start with Tylenol and ibuprofen and alternate taking them every 2 hours for 1-2 days  If this is not sufficient then substituted the Tylenol for the narcotic pain medicine prescribed  Insure that you do not take more than 4000 mg of Tylenol per day  You do not need to take the narcotic pain medications unless you are having significant pain and discomfort  7  Driving: You will need someone to drive you home on the day of surgery  Do not drive or make any important decisions while on narcotic pain medication or 24 hours and after anesthesia or sedation for surgery   Generally, you may drive when your off all narcotic pain medications  8  Upset Stomach: You may take Maalox, Tums, or similar items for an upset stomach  If your narcotic pain medication causes an upset stomach, do not take it on an empty stomach  Try taking it with at least some crackers or toast      9  Constipation: Patients often experienced constipation after surgery  You may take over-the-counter medication for this, such as Metamucil, Senokot, Dulcolax, milk of magnesia, etc  You may take a suppository unless you have had anorectal surgery such as a procedure on your hemorrhoids  If you experience significant nausea or vomiting after abdominal surgery, call the office before trying any of these medications  10  Call the office: If you are experiencing any of the following, fevers above 101 5°, significant nausea or vomiting, if the wound develops drainage and/or is excessive redness around the wound, or if you have significant diarrhea or other worsening symptoms  11  Pain: You may be given a prescription for pain  This will be given to the hospital, the day of surgery  12  Sexual Activity: You may resume sexual activity when you feel ready and comfortable and your incision is sealed and healed without apparent infection risk  Darion-Pisano Drain Care   WHAT YOU NEED TO KNOW:   A Darion-Pisano (TATUM) drain is used to remove fluids that build up in an area of your body after surgery  The TATUM drain is a bulb shaped device connected to a tube  One end of the tube is placed inside you during surgery  The other end comes out through a small cut in your skin  The bulb is connected to this end  You may have a stitch to hold the tube in place  DISCHARGE INSTRUCTIONS:   Return to the emergency department if:   · Your TATUM drain breaks or comes out  · You have cloudy yellow or brown drainage from your TATUM drain site, or the drainage smells bad  Contact your healthcare provider if:   · You drain less than 30 milliliters (2 tablespoons) in 24 hours   This may mean your drain can be removed  · You suddenly stop draining fluid or think your TATUM drain is blocked  · You have a fever higher than 101 5°F (38 6°C)  · You have increased pain, redness, or swelling around the drain site  · You have questions about your TATUM drain care  How a Darion-Pisano drain works: The TATUM drain removes fluids by creating suction in the tube  The bulb is squeezed flat and connected to the tube that sticks out of your body  The bulb expands as it fills with fluid  How to change the bandage around your Darion-Pisano drain:  If you have a bandage, change it once a day  You may need to change your bandage more than once a day if it gets completely wet  · Wash your hands with soap and water  · Loosen the tape and gently remove the old bandage  Throw the old bandage into a plastic trash bag  · Use soap and water or saline solution to clean your TATUM drain site  Dip a cotton swab or gauze pad in the solution and gently clean your skin  · Pat the area dry  · Place a new bandage on your TATUM drain site and secure it to your skin with surgical tape  · Wash your hands  How to empty the Darion-Pisano drain:  Empty the bulb when it is half full or every 8 to 12 hours  · Wash your hands with soap and water  · Remove the plug from the bulb  · Pour the fluid into a measuring cup  · Clean the plug with an alcohol swab or a cotton ball dipped in rubbing alcohol  · Squeeze the bulb flat and put the plug back in  The bulb should stay flat until it starts to fill with fluid again  · Measure the amount of fluid you pour out  Write down how much fluid you empty from the TATUM drain and the date and time you collected it  · Flush the fluid down the toilet  Wash your hands  Clear clogged tubing:  Use the following steps to clear your Darion-Pisano tubing:  · Hold the tubing between your thumb and first finger at the place closest to your skin   This hand will prevent the tube from being pulled out of your skin  · Use your other thumb and first finger to slide the clog down the tubing toward the bulb  You may have to repeat the sliding until the tubing is unclogged  Darion-Pisano drain removal:  The amount of fluid that you drain will decrease as your wound heals  The TATUM drain usually is removed when less than 30 milliliters (2 tablespoons) is collected in 24 hours  Ask your healthcare provider when and how your TATUM drain will be removed  Follow up with your doctor as directed:  Write down your questions so you remember to ask them during your visits  © [employer] 2021 Information is for End User's use only and may not be sold, redistributed or otherwise used for commercial purposes  All illustrations and images included in CareNotes® are the copyrighted property of [employer]  or Trevor Ferreira   The above information is an  only  It is not intended as medical advice for individual conditions or treatments  Talk to your doctor, nurse or pharmacist before following any medical regimen to see if it is safe and effective for you

## 2022-01-12 NOTE — PROGRESS NOTES
Progress Note - General Surgery   Sony Martínez 49 y o  male MRN: [id number]  Unit/Bed#: E2 -01 Encounter: [id number]    Assessment:  49 M status post laparoscopic cholecystectomy for acute cholecystitis on 01/11     100 cc serosanguineous output from Darion-Pisano drain, 50 cc overnight, now trending serous    Abdomen soft, appropriately tender near incisions, nondistended, incisions C/D/I    CMP within normal limits, T bili 0 7 from 1 05    WBC 17 87 from 15, likely reactive  Hemoglobin 13 1 from 14 8    Plan:  -diet as tolerated  -ambulation out of bed  -monitor drain output, will discuss timing of removal, either prior to discharge or in office postoperatively  -discontinue IV fluids  -DVT prophylaxis  -disposition planning    Subjective/Objective     Subjective:  Patient reports an appetite, eating without difficulty, no nausea or vomiting, well controlled abdominal pain, ambulating out of bed to toilet no issues    Objective:     Blood pressure 105/57, pulse 89, temperature 98 1 °F (36 7 °C), temperature source Temporal, resp  rate 20, height 5' 5" (1 651 m), weight 90 7 kg (200 lb), SpO2 96 %  ,Body mass index is 33 28 kg/m²        Intake/Output Summary (Last 24 hours) at 1/12/2022 3347  Last data filed at 1/12/2022 0601  Gross per 24 hour   Intake 3500 ml   Output 120 ml   Net 3380 ml       Invasive Devices  Report    Peripheral Intravenous Line            Peripheral IV 01/10/22 Right Antecubital 1 day          Drain            Closed/Suction Drain Abdomen Bulb <1 day                Physical Exam:     General:  No acute distress  HEENT:  Normocephalic, atraumatic, sclerae anicteric  Cardiovascular:  Regular rate, regular rhythm  Respiratory:  No distress, room air  Abdomen:  Soft, minimally and appropriately tender, nondistended, incisions C/D/I, right-sided Darion-Pisano drain with light serosanguineous effluent  Extremities:  No clubbing, cyanosis, or edema  Neuro:  AAO x3  Skin:  Warm, dry      Lab, Imaging and other studies:  I have personally reviewed pertinent lab results    , CBC:   Lab Results   Component Value Date    WBC 17 87 (H) 01/12/2022    HGB 13 1 01/12/2022    HCT 40 6 01/12/2022    MCV 89 01/12/2022     01/12/2022    MCH 28 7 01/12/2022    MCHC 32 3 01/12/2022    RDW 14 2 01/12/2022    MPV 9 6 01/12/2022    NRBC 0 01/12/2022   , CMP:   Lab Results   Component Value Date    SODIUM 136 01/12/2022    K 3 9 01/12/2022     01/12/2022    CO2 27 01/12/2022    BUN 13 01/12/2022    CREATININE 1 10 01/12/2022    CALCIUM 8 3 01/12/2022    AST 56 (H) 01/12/2022    ALT 81 (H) 01/12/2022    ALKPHOS 86 01/12/2022    EGFR 77 01/12/2022     VTE Pharmacologic Prophylaxis: Heparin  VTE Mechanical Prophylaxis: sequential compression device

## 2022-01-13 ENCOUNTER — TRANSITIONAL CARE MANAGEMENT (OUTPATIENT)
Dept: FAMILY MEDICINE CLINIC | Facility: CLINIC | Age: 52
End: 2022-01-13

## 2022-01-14 ENCOUNTER — OFFICE VISIT (OUTPATIENT)
Dept: FAMILY MEDICINE CLINIC | Facility: CLINIC | Age: 52
End: 2022-01-14
Payer: COMMERCIAL

## 2022-01-14 ENCOUNTER — TELEPHONE (OUTPATIENT)
Dept: FAMILY MEDICINE CLINIC | Facility: CLINIC | Age: 52
End: 2022-01-14

## 2022-01-14 VITALS
TEMPERATURE: 97.6 F | WEIGHT: 198 LBS | HEIGHT: 64 IN | DIASTOLIC BLOOD PRESSURE: 70 MMHG | HEART RATE: 76 BPM | BODY MASS INDEX: 33.8 KG/M2 | OXYGEN SATURATION: 96 % | RESPIRATION RATE: 20 BRPM | SYSTOLIC BLOOD PRESSURE: 120 MMHG

## 2022-01-14 DIAGNOSIS — Z90.49 S/P CHOLECYSTECTOMY: Primary | ICD-10-CM

## 2022-01-14 PROCEDURE — 99495 TRANSJ CARE MGMT MOD F2F 14D: CPT | Performed by: NURSE PRACTITIONER

## 2022-01-14 PROCEDURE — 1111F DSCHRG MED/CURRENT MED MERGE: CPT | Performed by: NURSE PRACTITIONER

## 2022-01-14 NOTE — PROGRESS NOTES
Assessment/Plan:    S/P cholecystectomy  -patient underwent cholecystectomy on 1/11  Pt with TATUM drain in place and will be following up with General surgery for removal in 1 week  He states he drains the TATUM himself and drainage in 8 hours is usually approximately 20 ccs  In office TATUM with approx  20 mls of serosanguinous drainage  Site intact,clean and dry  Gauze with mild yellowish drainage noted  Some tenderness but pt states he has not taken his pain medications and will with food  Advised to walk to help gastric motility as he is mildly distended  Pt in agreement with plan  States he will be bringing paperwork to General surgery due to specific restrictions his surgeon told him  He will make appointment today  To f/u if s/s do not improve or he notices extreme pain or fevers  Diagnoses and all orders for this visit:    S/P cholecystectomy  -     Ambulatory Referral to General Surgery; Future  -     CBC and differential; Future  -     Comprehensive metabolic panel; Future          Subjective:      Patient ID: Williams Mcnamara is a 49 y o  male  49year old male patient here for follow up on TCM  States he had severe abdominal pain on 01/10/22 and had gallbladder surgery  Pt had gallbladder removed and has drain was placed in the right upper quadrant  Pt had TATUM drain and he drains himself  Serosanguinous liquid drainage  Pt states he gets about 20ml in 8 hours  States that TATUM drain will be removed in 1 week           The following portions of the patient's history were reviewed and updated as appropriate: allergies, current medications, past family history, past medical history, past social history, past surgical history and problem list     Review of Systems   Constitutional: Negative  Negative for appetite change, fatigue and fever  HENT: Negative  Negative for congestion, ear pain, postnasal drip, rhinorrhea, sore throat and voice change  Eyes: Negative  Respiratory: Negative    Negative for cough, chest tightness, shortness of breath and wheezing  Cardiovascular: Negative  Negative for chest pain and palpitations  Gastrointestinal: Negative  Negative for abdominal distention  Endocrine: Negative  Genitourinary: Negative  Negative for difficulty urinating  Musculoskeletal: Negative  Negative for arthralgias  Skin: Positive for wound  Negative for color change and rash  Allergic/Immunologic: Negative  Neurological: Negative  Negative for dizziness and headaches  Hematological: Negative  Does not bruise/bleed easily  Psychiatric/Behavioral: Negative  Objective:      /70 (BP Location: Left arm, Patient Position: Sitting, Cuff Size: Adult)   Pulse 76   Temp 97 6 °F (36 4 °C) (Temporal)   Resp 20   Ht 5' 4" (1 626 m)   Wt 89 8 kg (198 lb)   SpO2 96%   BMI 33 99 kg/m²          Physical Exam  Vitals and nursing note reviewed  Constitutional:       General: He is not in acute distress  Appearance: Normal appearance  He is obese  He is not ill-appearing  HENT:      Head: Normocephalic and atraumatic  Right Ear: External ear normal       Left Ear: External ear normal       Nose: Nose normal  No congestion or rhinorrhea  Mouth/Throat:      Pharynx: Oropharynx is clear  No oropharyngeal exudate  Eyes:      Conjunctiva/sclera: Conjunctivae normal    Cardiovascular:      Rate and Rhythm: Normal rate and regular rhythm  Pulses: Normal pulses  Heart sounds: Normal heart sounds  Pulmonary:      Effort: Pulmonary effort is normal  No respiratory distress  Breath sounds: Normal breath sounds  Abdominal:      General: Bowel sounds are normal  There is distension  Palpations: Abdomen is soft  Tenderness: There is abdominal tenderness in the right upper quadrant  Comments: Right side of upper abdomen with incision intact  No signs of infection noted  Gauze with some saturation noted  (pt changes daily)   Mildly tender to palpation  Some distention noted  TATUM drain in place and draining adequately  Musculoskeletal:         General: Normal range of motion  Cervical back: Normal range of motion and neck supple  Skin:     General: Skin is warm and dry  Capillary Refill: Capillary refill takes less than 2 seconds  Neurological:      General: No focal deficit present  Mental Status: He is alert and oriented to person, place, and time     Psychiatric:         Mood and Affect: Mood normal          Behavior: Behavior normal

## 2022-01-14 NOTE — ASSESSMENT & PLAN NOTE
-patient underwent cholecystectomy on 1/11  Pt with TATUM drain in place and will be following up with General surgery for removal in 1 week  He states he drains the TATUM himself and drainage in 8 hours is usually approximately 20 ccs  In office TATUM with approx  20 mls of serosanguinous drainage  Site intact,clean and dry  Gauze with mild yellowish drainage noted  Some tenderness but pt states he has not taken his pain medications and will with food  Advised to walk to help gastric motility as he is mildly distended  Pt in agreement with plan  States he will be bringing paperwork to General surgery due to specific restrictions his surgeon told him  He will make appointment today  To f/u if s/s do not improve or he notices extreme pain or fevers

## 2022-01-17 DIAGNOSIS — K81.0 ACUTE CHOLECYSTITIS: ICD-10-CM

## 2022-01-17 RX ORDER — OXYCODONE HYDROCHLORIDE AND ACETAMINOPHEN 5; 325 MG/1; MG/1
1 TABLET ORAL EVERY 6 HOURS PRN
Qty: 10 TABLET | Refills: 0 | Status: SHIPPED | OUTPATIENT
Start: 2022-01-17 | End: 2022-01-25

## 2022-01-18 ENCOUNTER — OFFICE VISIT (OUTPATIENT)
Dept: SURGERY | Facility: CLINIC | Age: 52
End: 2022-01-18

## 2022-01-18 VITALS
SYSTOLIC BLOOD PRESSURE: 147 MMHG | DIASTOLIC BLOOD PRESSURE: 89 MMHG | HEIGHT: 64 IN | HEART RATE: 99 BPM | BODY MASS INDEX: 33.94 KG/M2 | TEMPERATURE: 97.8 F | WEIGHT: 198.8 LBS | RESPIRATION RATE: 16 BRPM

## 2022-01-18 DIAGNOSIS — Z90.49 S/P CHOLECYSTECTOMY: ICD-10-CM

## 2022-01-18 PROCEDURE — 99024 POSTOP FOLLOW-UP VISIT: CPT | Performed by: PHYSICIAN ASSISTANT

## 2022-01-18 NOTE — PROGRESS NOTES
Assessment/Plan:   Sony Martínez is a 49 y o male who comes in today for postoperative check after laparoscopic cholecystectomy with TATUM drain due to acute gangrenous cholecystitis on 1/11/2022 with Dr Eleazar Blood  Pt states he gets about 20ml in 8 hours from drain  His pain is well controlled  Patient is pleased with the outcome of surgery and is doing well  Pathology: pathology is pending        ______________________________________________________  HPI:  Brinda Pleitez is a 49 y o male who comes in today for postoperative check after recent laparoscopic cholecystectomy with TATUM drain due to acute gangrenous cholecystitis on 1/11/2022 with Dr Eleazar Blood  Serosanguinous fluid in TATUM drain today  Drain was pulled per Dr Eleazar Blood  Currently doing well without problems, no fever or chills,no nausea and no vomiting  Patient reports they have resumed their normal diet  denies biliary diarrhea  Reports serosanguinous fluid draining from drain  Per Dr Eleazar Blood if no dark drainage the tube can be removed  TATUM drain valve was opened, the stitch was removed and tube slowly pulled from the patient  ROS:  General ROS: negative for - chills, fatigue, fever or night sweats, weight loss  Respiratory ROS: no cough, shortness of breath, or wheezing  Cardiovascular ROS: no chest pain or dyspnea on exertion  Genito-Urinary ROS: no dysuria, trouble voiding, or hematuria  Musculoskeletal ROS: negative for - gait disturbance, joint pain or muscle pain  Neurological ROS: no TIA or stroke symptoms  GI ROS: see HPI  Skin ROS: no new rashes or lesions   Lymphatic ROS: no new adenopathy noted by pt  Psy ROS: no new mental or behavioral disturbances       Patient Active Problem List   Diagnosis    Mixed hyperlipidemia    Encounter for well adult exam with abnormal findings    Obesity (BMI 30 0-34  9)    Keloid of skin    Hyperglycemia    Screening for cholesterol level    Suspected exposure to hazardous chemical    Needs flu shot    Other fatigue    Routine check-up    Erectile dysfunction    Annual physical exam    Screening for colon cancer    Vitamin D deficiency    Screening for prostate cancer    Encounter for hepatitis C screening test for low risk patient    Epigastric pain    S/P cholecystectomy           Allergies:  Patient has no known allergies  Current Outpatient Medications:     oxyCODONE-acetaminophen (Percocet) 5-325 mg per tablet, Take 1 tablet by mouth every 6 (six) hours as needed for moderate pain for up to 10 days Max Daily Amount: 4 tablets, Disp: 10 tablet, Rfl: 0    Past Medical History:   Diagnosis Date    Elevated cholesterol     Fingertip amputation     right 5th    High cholesterol        Past Surgical History:   Procedure Laterality Date    CHOLECYSTECTOMY LAPAROSCOPIC N/A 1/11/2022    Procedure: CHOLECYSTECTOMY LAPAROSCOPIC;  Surgeon: Viktoriya Castillo MD;  Location: [facility];  Service: General    COLONOSCOPY         Family History   Problem Relation Age of Onset    Hypertension Mother     No Known Problems Father     No Known Problems Sister     No Known Problems Brother     No Known Problems Maternal Aunt     No Known Problems Maternal Uncle     No Known Problems Paternal Aunt     No Known Problems Paternal Uncle     No Known Problems Maternal Grandmother     No Known Problems Maternal Grandfather     No Known Problems Paternal Grandmother     No Known Problems Paternal Grandfather     ADD / ADHD Neg Hx     Anesthesia problems Neg Hx     Cancer Neg Hx     Clotting disorder Neg Hx     Collagen disease Neg Hx     Diabetes Neg Hx     Dislocations Neg Hx     Learning disabilities Neg Hx     Neurological problems Neg Hx     Osteoporosis Neg Hx     Rheumatologic disease Neg Hx     Scoliosis Neg Hx     Vascular Disease Neg Hx         reports that he has never smoked  He has never used smokeless tobacco  He reports current alcohol use   He reports that he does not use drugs  Vitals:    01/18/22 0921   BP: 147/89   Pulse: 99   Resp: 16   Temp: 97 8 °F (36 6 °C)       PHYSICAL EXAM  General: normal, cooperative, no distress  Abdominal: soft, nondistended or nontender  Incision: clean, dry, and intact and healing well some bruising  TATUM tube removed by opening valve and cutting suture then slowly moved  Steri strip closed the opening and 4 x 4 and tape over the drain site      Sandra Senior PA-C      Date: 1/18/2022 Time: 9:28 AM

## 2022-01-25 ENCOUNTER — OFFICE VISIT (OUTPATIENT)
Dept: SURGERY | Facility: CLINIC | Age: 52
End: 2022-01-25

## 2022-01-25 VITALS
DIASTOLIC BLOOD PRESSURE: 86 MMHG | SYSTOLIC BLOOD PRESSURE: 125 MMHG | RESPIRATION RATE: 16 BRPM | HEART RATE: 81 BPM | HEIGHT: 64 IN | TEMPERATURE: 97.1 F | WEIGHT: 200 LBS | BODY MASS INDEX: 34.15 KG/M2

## 2022-01-25 DIAGNOSIS — Z90.49 STATUS POST CHOLECYSTECTOMY: Primary | ICD-10-CM

## 2022-01-25 PROCEDURE — 3008F BODY MASS INDEX DOCD: CPT | Performed by: NURSE PRACTITIONER

## 2022-01-25 PROCEDURE — 99024 POSTOP FOLLOW-UP VISIT: CPT | Performed by: PHYSICIAN ASSISTANT

## 2022-01-25 NOTE — PROGRESS NOTES
Assessment/Plan:   Sony Martínez is a 49 y o male who comes in today for postoperative check after laparoscopic cholecystectomy with TATUM drain due to acute gangrenous cholecystitis on 1/11/2022 with Dr Meagan Grace  TATUM drain removed at appointment on 1/18  Returning to work on 2/14/22  Listing restrictions reviewed  Patient is pleased with the outcome of surgery and is doing well  Pathology:   Final Diagnosis   A  Gallbladder, cholecystectomy:  -  Acute and necrotizing cholecystitis with cholelithiasis  _______________________________  HPI:  Rain Savage is a 49 y o male who comes in today for postoperative check after recent laparoscopic cholecystectomy with TATUM drain due to acute gangrenous cholecystitis on 1/11/2022 with Dr Meagan Grace  Serosanguinous fluid in TATUM drain today  Drain was pulled per Dr Meagan Grace  Currently doing well without problems, no fever or chills,no nausea and no vomiting  Patient reports they have resumed their normal diet  denies biliary diarrhea  Since removal of the TATUM drain he states no abdominal pain, fevers, nausea, or vomiting  Incision sites are healing nicely  ROS:  General ROS: negative for - chills, fatigue, fever or night sweats, weight loss  Respiratory ROS: no cough, shortness of breath, or wheezing  Cardiovascular ROS: no chest pain or dyspnea on exertion  Genito-Urinary ROS: no dysuria, trouble voiding, or hematuria  Musculoskeletal ROS: negative for - gait disturbance, joint pain or muscle pain  Neurological ROS: no TIA or stroke symptoms  GI ROS: see HPI  Skin ROS: no new rashes or lesions   Lymphatic ROS: no new adenopathy noted by pt  Psy ROS: no new mental or behavioral disturbances       Patient Active Problem List   Diagnosis    Mixed hyperlipidemia    Encounter for well adult exam with abnormal findings    Obesity (BMI 30 0-34  9)    Keloid of skin    Hyperglycemia    Screening for cholesterol level    Suspected exposure to hazardous chemical    Needs flu shot    Other fatigue    Routine check-up    Erectile dysfunction    Annual physical exam    Screening for colon cancer    Vitamin D deficiency    Screening for prostate cancer    Encounter for hepatitis C screening test for low risk patient    Epigastric pain    S/P cholecystectomy           Allergies:  Patient has no known allergies  Current Outpatient Medications:     oxyCODONE-acetaminophen (Percocet) 5-325 mg per tablet, Take 1 tablet by mouth every 6 (six) hours as needed for moderate pain for up to 10 days Max Daily Amount: 4 tablets, Disp: 10 tablet, Rfl: 0    Past Medical History:   Diagnosis Date    Elevated cholesterol     Fingertip amputation     right 5th    High cholesterol        Past Surgical History:   Procedure Laterality Date    CHOLECYSTECTOMY LAPAROSCOPIC N/A 1/11/2022    Procedure: CHOLECYSTECTOMY LAPAROSCOPIC;  Surgeon: Loida Smith MD;  Location: [facility];  Service: General    COLONOSCOPY         Family History   Problem Relation Age of Onset    Hypertension Mother     No Known Problems Father     No Known Problems Sister     No Known Problems Brother     No Known Problems Maternal Aunt     No Known Problems Maternal Uncle     No Known Problems Paternal Aunt     No Known Problems Paternal Uncle     No Known Problems Maternal Grandmother     No Known Problems Maternal Grandfather     No Known Problems Paternal Grandmother     No Known Problems Paternal Grandfather     ADD / ADHD Neg Hx     Anesthesia problems Neg Hx     Cancer Neg Hx     Clotting disorder Neg Hx     Collagen disease Neg Hx     Diabetes Neg Hx     Dislocations Neg Hx     Learning disabilities Neg Hx     Neurological problems Neg Hx     Osteoporosis Neg Hx     Rheumatologic disease Neg Hx     Scoliosis Neg Hx     Vascular Disease Neg Hx         reports that he has never smoked  He has never used smokeless tobacco  He reports current alcohol use   He reports that he does not use drugs  There were no vitals filed for this visit  PHYSICAL EXAM  General: normal, cooperative, no distress  Abdominal: soft, nondistended or nontender  Incision: clean, dry, and intact and healing well some bruising   TATUM drain area is healed    Sandra Senior PA-C      Date: 1/25/2022 Time: 10:50 AM

## 2022-01-31 ENCOUNTER — TELEPHONE (OUTPATIENT)
Dept: OTHER | Facility: OTHER | Age: 52
End: 2022-01-31

## 2022-01-31 DIAGNOSIS — N52.8 OTHER MALE ERECTILE DYSFUNCTION: Primary | ICD-10-CM

## 2022-01-31 RX ORDER — SILDENAFIL CITRATE 20 MG/1
20 TABLET ORAL AS NEEDED
Qty: 30 TABLET | Refills: 6 | Status: SHIPPED | OUTPATIENT
Start: 2022-01-31 | End: 2022-02-28 | Stop reason: SDUPTHER

## 2022-01-31 NOTE — TELEPHONE ENCOUNTER
Patient is requesting script/refill for sildenafil  I do not see it under current meds but see he has been prescribed this before

## 2022-02-04 ENCOUNTER — APPOINTMENT (OUTPATIENT)
Dept: LAB | Facility: HOSPITAL | Age: 52
End: 2022-02-04
Payer: COMMERCIAL

## 2022-02-04 ENCOUNTER — OFFICE VISIT (OUTPATIENT)
Dept: FAMILY MEDICINE CLINIC | Facility: CLINIC | Age: 52
End: 2022-02-04
Payer: COMMERCIAL

## 2022-02-04 VITALS
OXYGEN SATURATION: 97 % | HEIGHT: 64 IN | BODY MASS INDEX: 33.53 KG/M2 | RESPIRATION RATE: 20 BRPM | DIASTOLIC BLOOD PRESSURE: 80 MMHG | WEIGHT: 196.4 LBS | HEART RATE: 74 BPM | SYSTOLIC BLOOD PRESSURE: 121 MMHG | TEMPERATURE: 98.3 F

## 2022-02-04 DIAGNOSIS — R42 VERTIGO: Primary | ICD-10-CM

## 2022-02-04 DIAGNOSIS — N52.9 ERECTILE DYSFUNCTION, UNSPECIFIED ERECTILE DYSFUNCTION TYPE: ICD-10-CM

## 2022-02-04 DIAGNOSIS — Z90.49 S/P CHOLECYSTECTOMY: ICD-10-CM

## 2022-02-04 PROBLEM — E66.811 OBESITY (BMI 30.0-34.9): Status: RESOLVED | Noted: 2019-07-29 | Resolved: 2022-02-04

## 2022-02-04 PROBLEM — Z12.5 SCREENING FOR PROSTATE CANCER: Status: RESOLVED | Noted: 2021-10-25 | Resolved: 2022-02-04

## 2022-02-04 PROBLEM — Z23 NEEDS FLU SHOT: Status: RESOLVED | Noted: 2020-09-08 | Resolved: 2022-02-04

## 2022-02-04 PROBLEM — E66.9 OBESITY (BMI 30.0-34.9): Status: RESOLVED | Noted: 2019-07-29 | Resolved: 2022-02-04

## 2022-02-04 PROBLEM — Z13.220 SCREENING FOR CHOLESTEROL LEVEL: Status: RESOLVED | Noted: 2019-07-29 | Resolved: 2022-02-04

## 2022-02-04 PROBLEM — Z00.01 ENCOUNTER FOR WELL ADULT EXAM WITH ABNORMAL FINDINGS: Status: RESOLVED | Noted: 2019-07-29 | Resolved: 2022-02-04

## 2022-02-04 PROBLEM — Z00.00 ROUTINE CHECK-UP: Status: RESOLVED | Noted: 2020-09-08 | Resolved: 2022-02-04

## 2022-02-04 PROBLEM — Z00.00 ANNUAL PHYSICAL EXAM: Status: RESOLVED | Noted: 2021-10-25 | Resolved: 2022-02-04

## 2022-02-04 PROBLEM — L91.0 KELOID OF SKIN: Status: RESOLVED | Noted: 2019-07-29 | Resolved: 2022-02-04

## 2022-02-04 PROBLEM — Z12.11 SCREENING FOR COLON CANCER: Status: RESOLVED | Noted: 2021-10-25 | Resolved: 2022-02-04

## 2022-02-04 PROBLEM — R53.83 OTHER FATIGUE: Status: RESOLVED | Noted: 2020-09-08 | Resolved: 2022-02-04

## 2022-02-04 PROBLEM — Z77.098: Status: RESOLVED | Noted: 2019-07-29 | Resolved: 2022-02-04

## 2022-02-04 PROBLEM — Z11.59 ENCOUNTER FOR HEPATITIS C SCREENING TEST FOR LOW RISK PATIENT: Status: RESOLVED | Noted: 2021-10-25 | Resolved: 2022-02-04

## 2022-02-04 LAB
ALBUMIN SERPL BCP-MCNC: 4.3 G/DL (ref 3–5.2)
ALP SERPL-CCNC: 86 U/L (ref 43–122)
ALT SERPL W P-5'-P-CCNC: 31 U/L
ANION GAP SERPL CALCULATED.3IONS-SCNC: 9 MMOL/L (ref 5–14)
AST SERPL W P-5'-P-CCNC: 25 U/L (ref 17–59)
BASOPHILS # BLD AUTO: 0 THOUSANDS/ΜL (ref 0–0.1)
BASOPHILS NFR BLD AUTO: 1 % (ref 0–1)
BILIRUB SERPL-MCNC: 0.83 MG/DL
BUN SERPL-MCNC: 17 MG/DL (ref 5–25)
CALCIUM SERPL-MCNC: 9.5 MG/DL (ref 8.4–10.2)
CHLORIDE SERPL-SCNC: 102 MMOL/L (ref 97–108)
CO2 SERPL-SCNC: 30 MMOL/L (ref 22–30)
CREAT SERPL-MCNC: 0.96 MG/DL (ref 0.7–1.5)
EOSINOPHIL # BLD AUTO: 0.3 THOUSAND/ΜL (ref 0–0.4)
EOSINOPHIL NFR BLD AUTO: 6 % (ref 0–6)
ERYTHROCYTE [DISTWIDTH] IN BLOOD BY AUTOMATED COUNT: 14 %
GFR SERPL CREATININE-BSD FRML MDRD: 91 ML/MIN/1.73SQ M
GLUCOSE P FAST SERPL-MCNC: 109 MG/DL (ref 70–99)
HCT VFR BLD AUTO: 43.9 % (ref 41–53)
HGB BLD-MCNC: 14.6 G/DL (ref 13.5–17.5)
LYMPHOCYTES # BLD AUTO: 2.6 THOUSANDS/ΜL (ref 0.5–4)
LYMPHOCYTES NFR BLD AUTO: 45 % (ref 25–45)
MCH RBC QN AUTO: 29.4 PG (ref 26–34)
MCHC RBC AUTO-ENTMCNC: 33.4 G/DL (ref 31–36)
MCV RBC AUTO: 88 FL (ref 80–100)
MONOCYTES # BLD AUTO: 0.3 THOUSAND/ΜL (ref 0.2–0.9)
MONOCYTES NFR BLD AUTO: 5 % (ref 1–10)
NEUTROPHILS # BLD AUTO: 2.5 THOUSANDS/ΜL (ref 1.8–7.8)
NEUTS SEG NFR BLD AUTO: 43 % (ref 45–65)
PLATELET # BLD AUTO: 308 THOUSANDS/UL (ref 150–450)
PMV BLD AUTO: 8.1 FL (ref 8.9–12.7)
POTASSIUM SERPL-SCNC: 3.8 MMOL/L (ref 3.6–5)
PROT SERPL-MCNC: 8.1 G/DL (ref 5.9–8.4)
RBC # BLD AUTO: 4.98 MILLION/UL (ref 4.5–5.9)
SODIUM SERPL-SCNC: 141 MMOL/L (ref 137–147)
WBC # BLD AUTO: 5.7 THOUSAND/UL (ref 4.5–11)

## 2022-02-04 PROCEDURE — 80053 COMPREHEN METABOLIC PANEL: CPT

## 2022-02-04 PROCEDURE — 3008F BODY MASS INDEX DOCD: CPT | Performed by: PHYSICIAN ASSISTANT

## 2022-02-04 PROCEDURE — 84403 ASSAY OF TOTAL TESTOSTERONE: CPT

## 2022-02-04 PROCEDURE — 84402 ASSAY OF FREE TESTOSTERONE: CPT

## 2022-02-04 PROCEDURE — 1111F DSCHRG MED/CURRENT MED MERGE: CPT | Performed by: PHYSICIAN ASSISTANT

## 2022-02-04 PROCEDURE — 1036F TOBACCO NON-USER: CPT | Performed by: PHYSICIAN ASSISTANT

## 2022-02-04 PROCEDURE — 85025 COMPLETE CBC W/AUTO DIFF WBC: CPT

## 2022-02-04 PROCEDURE — 99214 OFFICE O/P EST MOD 30 MIN: CPT | Performed by: PHYSICIAN ASSISTANT

## 2022-02-04 PROCEDURE — 36415 COLL VENOUS BLD VENIPUNCTURE: CPT

## 2022-02-04 RX ORDER — MECLIZINE HYDROCHLORIDE 25 MG/1
25 TABLET ORAL 3 TIMES DAILY PRN
Qty: 30 TABLET | Refills: 0 | Status: SHIPPED | OUTPATIENT
Start: 2022-02-04

## 2022-02-04 NOTE — ASSESSMENT & PLAN NOTE
Pt having episodic vertigo for the past week  Exacerbated by head movement, turning over in bed  No N/V, head trauma  He had similar dizziness in the past and was treated with meclizine  Reviewed Epley maneuver with pt  Advised him to try it multiple times per day until dizziness subsides  Also prescribing meclizine for as needed use

## 2022-02-04 NOTE — PATIENT INSTRUCTIONS
Benign Paroxysmal Positional Vertigo   WHAT YOU NEED TO KNOW:   BPPV is an inner ear condition that causes you to suddenly feel dizzy  Benign means it is not serious or life-threatening  BPPV is caused by a problem with the nerves and structure of your inner ear  BPPV happens when small pieces of calcium break loose and lump together in one of your inner ear canals  DISCHARGE INSTRUCTIONS:   Return to the emergency department if:   · You fall during a BPPV episode and are injured  · You have a severe headache that does not go away  · You have new changes in your vision or feel weak or confused  · You have problems hearing, or you have ringing or buzzing in your ears  Contact your healthcare provider if:   · Your BPPV symptoms do not go away or they return  · You have problems with your balance, or you are falling often  · You have new or increased nausea or vomiting with vertigo  · You feel anxious or depressed and do not want to leave your home  · You have questions or concerns about your condition or care  Medicines:   · Medicines  may be recommended or prescribed to treat dizziness or nausea  · Take your medicine as directed  Contact your healthcare provider if you think your medicine is not helping or if you have side effects  Tell him of her if you are allergic to any medicine  Keep a list of the medicines, vitamins, and herbs you take  Include the amounts, and when and why you take them  Bring the list or the pill bottles to follow-up visits  Carry your medicine list with you in case of an emergency  Prevent your symptoms:   · Try to avoid sudden head movements  Stand up and lie down slowly  · Raise and support your head when you lie down  Place pillows under your upper back and head or rest in a recliner  · Change your position often when you are lying down  Try not to lie with your head on the same side for long periods of time  Roll over slowly       · Wear protective gear  when you ride a bike or play sports  A helmet helps protect your head from injury  Follow up with your healthcare provider as directed: You may need to return in 1 month to check the progress of your treatment  Write down your questions so you remember to ask them during your visits  © Copyright [employer] 2021 Information is for End User's use only and may not be sold, redistributed or otherwise used for commercial purposes  All illustrations and images included in CareNotes® are the copyrighted property of [employer]  or [facility] Areli Ferreira   The above information is an  only  It is not intended as medical advice for individual conditions or treatments  Talk to your doctor, nurse or pharmacist before following any medical regimen to see if it is safe and effective for you

## 2022-02-04 NOTE — PROGRESS NOTES
Assessment/Plan:    Vertigo  Pt having episodic vertigo for the past week  Exacerbated by head movement, turning over in bed  No N/V, head trauma  He had similar dizziness in the past and was treated with meclizine  Reviewed Epley maneuver with pt  Advised him to try it multiple times per day until dizziness subsides  Also prescribing meclizine for as needed use  Diagnoses and all orders for this visit:    Vertigo  -     meclizine (ANTIVERT) 25 mg tablet; Take 1 tablet (25 mg total) by mouth 3 (three) times a day as needed for dizziness          Subjective:      Patient ID: Juan Smith is a 49 y o  male  Sony presents to the office today for evaluation of dizziness x 1 week  The following portions of the patient's history were reviewed and updated as appropriate: allergies, current medications, past family history, past medical history, past social history, past surgical history and problem list     Review of Systems   Constitutional: Negative for chills, fever and unexpected weight change  HENT: Negative for congestion, rhinorrhea and sore throat  Eyes: Negative for visual disturbance  Respiratory: Negative for cough and shortness of breath  Cardiovascular: Negative for chest pain, palpitations and leg swelling  Gastrointestinal: Negative for abdominal pain, constipation, diarrhea, nausea and vomiting  Genitourinary: Negative for dysuria  Musculoskeletal: Negative for arthralgias and neck pain  Neurological: Positive for dizziness and headaches (mild)  Negative for syncope  Psychiatric/Behavioral: The patient is not nervous/anxious  Objective:      /80 (BP Location: Left arm, Patient Position: Sitting, Cuff Size: Standard)   Pulse 74   Temp 98 3 °F (36 8 °C) (Temporal)   Resp 20   Ht 5' 4" (1 626 m)   Wt 89 1 kg (196 lb 6 4 oz)   SpO2 97%   BMI 33 71 kg/m²          Physical Exam  Vitals reviewed     Constitutional:       General: He is not in acute distress  Appearance: Normal appearance  He is not toxic-appearing  HENT:      Head: Normocephalic and atraumatic  Eyes:      Extraocular Movements: Extraocular movements intact  Right eye: No nystagmus  Left eye: No nystagmus  Conjunctiva/sclera: Conjunctivae normal       Pupils: Pupils are equal, round, and reactive to light  Cardiovascular:      Rate and Rhythm: Normal rate and regular rhythm  Pulses: Normal pulses  Heart sounds: Normal heart sounds  No murmur heard  No friction rub  No gallop  Pulmonary:      Effort: Pulmonary effort is normal  No respiratory distress  Breath sounds: Normal breath sounds  No stridor  No wheezing, rhonchi or rales  Musculoskeletal:         General: Normal range of motion  Cervical back: Normal range of motion  Skin:     General: Skin is warm and dry  Neurological:      Mental Status: He is alert and oriented to person, place, and time  Gait: Gait normal    Psychiatric:         Mood and Affect: Mood normal          Behavior: Behavior normal          Thought Content:  Thought content normal

## 2022-02-05 LAB
TESTOST FREE SERPL-MCNC: 12.9 PG/ML (ref 7.2–24)
TESTOST SERPL-MCNC: 491 NG/DL (ref 264–916)

## 2022-02-28 DIAGNOSIS — N52.8 OTHER MALE ERECTILE DYSFUNCTION: ICD-10-CM

## 2022-02-28 RX ORDER — SILDENAFIL CITRATE 20 MG/1
20 TABLET ORAL AS NEEDED
Qty: 30 TABLET | Refills: 0 | Status: SHIPPED | OUTPATIENT
Start: 2022-02-28 | End: 2022-03-16

## 2022-03-16 ENCOUNTER — OFFICE VISIT (OUTPATIENT)
Dept: UROLOGY | Facility: CLINIC | Age: 52
End: 2022-03-16
Payer: COMMERCIAL

## 2022-03-16 VITALS
DIASTOLIC BLOOD PRESSURE: 70 MMHG | BODY MASS INDEX: 34.21 KG/M2 | WEIGHT: 200.4 LBS | HEIGHT: 64 IN | SYSTOLIC BLOOD PRESSURE: 106 MMHG | HEART RATE: 84 BPM

## 2022-03-16 DIAGNOSIS — N52.9 ERECTILE DYSFUNCTION, UNSPECIFIED ERECTILE DYSFUNCTION TYPE: Primary | ICD-10-CM

## 2022-03-16 PROCEDURE — 3008F BODY MASS INDEX DOCD: CPT | Performed by: NURSE PRACTITIONER

## 2022-03-16 PROCEDURE — 99212 OFFICE O/P EST SF 10 MIN: CPT | Performed by: NURSE PRACTITIONER

## 2022-03-16 PROCEDURE — 1036F TOBACCO NON-USER: CPT | Performed by: NURSE PRACTITIONER

## 2022-03-16 RX ORDER — MULTIVIT-MIN/IRON/FOLIC ACID/K 18-600-40
2000 CAPSULE ORAL DAILY
COMMUNITY

## 2022-03-16 RX ORDER — SILDENAFIL 100 MG/1
100 TABLET, FILM COATED ORAL DAILY PRN
Qty: 15 TABLET | Refills: 12 | Status: SHIPPED | OUTPATIENT
Start: 2022-03-16 | End: 2022-04-21 | Stop reason: SDUPTHER

## 2022-03-16 RX ORDER — SILDENAFIL 100 MG/1
100 TABLET, FILM COATED ORAL DAILY PRN
Qty: 15 TABLET | Refills: 12 | Status: SHIPPED | OUTPATIENT
Start: 2022-03-16 | End: 2022-03-16

## 2022-03-16 NOTE — PATIENT INSTRUCTIONS
Erectile Dysfunction   WHAT YOU NEED TO KNOW:   What is erectile dysfunction (ED)?  ED, or impotence, is when you cannot get or keep an erection for sexual activity  What causes ED? · Conditions that lead to nerve problems, such as a spinal cord injury, diabetes, or stroke    · Hormone imbalances, such as low testosterone, high prolactin, or a thyroid disorder    · Medical conditions that decrease blood flow, such as high blood pressure and arteriosclerosis    · Multiple sclerosis or Parkinson disease    · Medicines, such as those used to treat depression and high blood pressure    · Injury to the testicles from radiation therapy to the testicles    What increases my risk for ED? · Obesity    · Diabetes that is not controlled or heart disease    · Smoking, or drug or alcohol abuse    · An enlarged prostate    · Increasing age    · Spine or groin surgeries    · Stress, depression, relationship problems, and anxiety    How is ED diagnosed? Your healthcare provider will ask about your symptoms, medical history, and medicines  He or she will examine your abdomen, penis, and testicles  A rectal exam may also be done to check for an enlarged prostate  Blood and urine tests are done to check for medical conditions that may have caused your ED  You may also need tests to check your blood flow and nerve function  How is ED treated? Treatment depends on the cause of your ED  You may need any of the following:  · ED medicines  help you have an erection  These medicines are taken before you have sex  Follow instructions on how to use these medicines  You may have a life-threatening reaction if you mix ED medicines with medicines that contain nitrates  Medicines with nitrates include nitroglycerin and other heart medicines  · Testosterone  may be given to increase the levels in your blood and improve your ED  You may need to use a skin cream or wear a patch  Testosterone is also given as an injection      · Penis injections  may be done to help improve your blood flow  · A vacuum device  is a tube that is placed over the penis  A hand pump is connected to the tube and acts as a vacuum  This may help increase blood flow to the penis  · Therapy  may be needed to treat emotional or relationship problems that may be causing your ED  · Surgery  may be recommended if other treatments do not work  Surgery includes a penile implant or prosthesis  Surgery may also be done to improve blood flow  Ask for more information about surgeries for ED  How can I decrease my risk for ED? · Do not smoke  Smoking can increase your risk for ED  Nicotine and other chemicals in cigarettes and cigars can also cause lung damage  Ask your healthcare provider for information if you currently smoke and need help to quit  E-cigarettes or smokeless tobacco still contain nicotine  · Control your blood sugar levels if you have diabetes  Over time, high blood sugar levels can increase your risk for ED  · Limit alcohol  Men should limit alcohol to 2 drinks a day  A drink of alcohol is 12 ounces of beer, 5 ounces of wine, or 1½ ounces of liquor  · Manage your medical conditions  Eat a variety of healthy foods and stay physically active  Take your medicines as directed  They can help control conditions that may cause ED  · Manage stress  Learn ways to relax, such as deep breathing, meditation, and listening to music  · Do not use illegal drugs  They increase your risk for ED  When should I call my doctor? · You have chest pain, dizziness, or nausea after you take ED medicines or during or after sex  · You have an erection for more than 4 hours after you take your ED medicine  · You see blood in your urine  · You have changes in your vision, headaches, or back pain after you take ED medicine  · You have a painful erection  · You have questions or concerns about your condition or care      CARE AGREEMENT:   You have the right to help plan your care  Learn about your health condition and how it may be treated  Discuss treatment options with your healthcare providers to decide what care you want to receive  You always have the right to refuse treatment  The above information is an  only  It is not intended as medical advice for individual conditions or treatments  Talk to your doctor, nurse or pharmacist before following any medical regimen to see if it is safe and effective for you  © Copyright [facility] 2022 Information is for End User's use only and may not be sold, redistributed or otherwise used for commercial purposes   All illustrations and images included in CareNotes® are the copyrighted property of [employer]  or [address] [place] [address]

## 2022-03-16 NOTE — PROGRESS NOTES
Assessment and plan:     Erectile dysfunction  · Continue with sildenafil 100 mg as needed on an empty stomach  · Follow-up 1 year    YANELI John    History of Present Illness     Sony Cervantes is a 49 y o  male presents for 3 month follow-up after starting sildenafil for erectile dysfunction  He requires 100 mg which is effective  He denies any side effects from the medication  His testosterone level was normal at 491  He undergoes prostate cancer screening through his PCP and would like to continue this  Laboratory     Lab Results   Component Value Date    BUN 17 02/04/2022    CREATININE 0 96 02/04/2022       No components found for: GFR    Lab Results   Component Value Date    CALCIUM 9 5 02/04/2022    K 3 8 02/04/2022    CO2 30 02/04/2022     02/04/2022       Lab Results   Component Value Date    WBC 5 70 02/04/2022    HGB 14 6 02/04/2022    HCT 43 9 02/04/2022    MCV 88 02/04/2022     02/04/2022       Lab Results   Component Value Date    PSA 0 6 10/25/2021       No results found for this or any previous visit (from the past 1 hour(s))  @RESULT(URINEMICROSCOPIC)@    @RESULT(URINECULTURE)@    Radiology       Review of Systems     Review of Systems   Constitutional: Negative for activity change, appetite change, chills, fatigue, fever and unexpected weight change  HENT: Negative for facial swelling  Eyes: Negative for discharge  Respiratory: Negative  Negative for cough and shortness of breath  Cardiovascular: Negative for chest pain and leg swelling  Gastrointestinal: Negative  Negative for abdominal distention, abdominal pain, constipation, diarrhea, nausea and vomiting  Endocrine: Negative  Genitourinary: Negative  Negative for decreased urine volume, difficulty urinating, dysuria, enuresis, flank pain, frequency, genital sores, hematuria and urgency  Musculoskeletal: Negative for back pain and myalgias  Skin: Negative for pallor and rash  Allergic/Immunologic: Negative  Negative for immunocompromised state  Neurological: Negative for facial asymmetry and speech difficulty  Psychiatric/Behavioral: Negative for agitation and confusion  AUA SYMPTOM SCORE      Most Recent Value   AUA SYMPTOM SCORE    How often have you had a sensation of not emptying your bladder completely after you finished urinating? 0   How often have you had to urinate again less than two hours after you finished urinating? 0   How often have you found you stopped and started again several times when you urinate? 0   How often have you found it difficult to postpone urination? 0   How often have you had a weak urinary stream? 0   How often have you had to push or strain to begin urination? 0   How many times did you most typically get up to urinate from the time you went to bed at night until the time you got up in the morning? 0   Quality of Life: If you were to spend the rest of your life with your urinary condition just the way it is now, how would you feel about that? 0   AUA SYMPTOM SCORE 0                Allergies     No Known Allergies    Physical Exam     Physical Exam  Vitals reviewed  Constitutional:       General: He is not in acute distress  Appearance: Normal appearance  He is normal weight  He is not ill-appearing, toxic-appearing or diaphoretic  HENT:      Head: Normocephalic and atraumatic  Eyes:      General: No scleral icterus  Cardiovascular:      Rate and Rhythm: Normal rate  Pulmonary:      Effort: Pulmonary effort is normal  No respiratory distress  Abdominal:      General: Abdomen is flat  There is no distension  Palpations: Abdomen is soft  Musculoskeletal:         General: No swelling  Cervical back: Normal range of motion  Skin:     General: Skin is warm and dry  Coloration: Skin is not jaundiced or pale  Findings: No rash  Neurological:      General: No focal deficit present        Mental Status: He is alert and oriented to person, place, and time  Gait: Gait normal    Psychiatric:         Mood and Affect: Mood normal          Behavior: Behavior normal          Thought Content:  Thought content normal          Judgment: Judgment normal          Vital Signs     Vitals:    03/16/22 1455   BP: 106/70   Pulse: 84   Weight: 90 9 kg (200 lb 6 4 oz)   Height: 5' 4" (1 626 m)       Current Medications       Current Outpatient Medications:     meclizine (ANTIVERT) 25 mg tablet, Take 1 tablet (25 mg total) by mouth 3 (three) times a day as needed for dizziness, Disp: 30 tablet, Rfl: 0    Vitamin D, Cholecalciferol, 50 MCG (2000 UT) CAPS, Take 2,000 mg by mouth in the morning, Disp: , Rfl:     sildenafil (VIAGRA) 100 mg tablet, Take 1 tablet (100 mg total) by mouth daily as needed for erectile dysfunction, Disp: 15 tablet, Rfl: 12    Active Problems     Patient Active Problem List   Diagnosis    Mixed hyperlipidemia    Hyperglycemia    Erectile dysfunction    Vitamin D deficiency    Epigastric pain    Vertigo       Past Medical History     Past Medical History:   Diagnosis Date    Elevated cholesterol     Fingertip amputation     right 5th    High cholesterol     Keloid of skin 7/29/2019    S/P cholecystectomy 1/14/2022       Surgical History     Past Surgical History:   Procedure Laterality Date    CHOLECYSTECTOMY LAPAROSCOPIC N/A 1/11/2022    Procedure: CHOLECYSTECTOMY LAPAROSCOPIC;  Surgeon: Viktoriya Castillo MD;  [facility];  Service: General    COLONOSCOPY         Family History     Family History   Problem Relation Age of Onset    Hypertension Mother     No Known Problems Father     No Known Problems Sister     No Known Problems Brother     No Known Problems Maternal Aunt     No Known Problems Maternal Uncle     No Known Problems Paternal Aunt     No Known Problems Paternal Uncle     No Known Problems Maternal Grandmother     No Known Problems Maternal Grandfather     No Known Problems Paternal Grandmother     No Known Problems Paternal Grandfather     ADD / ADHD Neg Hx     Anesthesia problems Neg Hx     Cancer Neg Hx     Clotting disorder Neg Hx     Collagen disease Neg Hx     Diabetes Neg Hx     Dislocations Neg Hx     Learning disabilities Neg Hx     Neurological problems Neg Hx     Osteoporosis Neg Hx     Rheumatologic disease Neg Hx     Scoliosis Neg Hx     Vascular Disease Neg Hx        Social History     Social History     Social History     Tobacco Use   Smoking Status Never Smoker   Smokeless Tobacco Never Used       Past Surgical History:   Procedure Laterality Date    CHOLECYSTECTOMY LAPAROSCOPIC N/A 1/11/2022    Procedure: CHOLECYSTECTOMY LAPAROSCOPIC;  Surgeon: Hao Cobos MD;  Location: [facility];  Service: General    COLONOSCOPY           The following portions of the patient's history were reviewed and updated as appropriate: allergies, current medications, past family history, past medical history, past social history, past surgical history and problem list    Please note :  Voice dictation software has been used to create this document  There may be inadvertent transcription errors      [id number] [address]

## 2022-04-21 DIAGNOSIS — N52.9 ERECTILE DYSFUNCTION, UNSPECIFIED ERECTILE DYSFUNCTION TYPE: ICD-10-CM

## 2022-04-21 RX ORDER — SILDENAFIL 100 MG/1
100 TABLET, FILM COATED ORAL DAILY PRN
Qty: 15 TABLET | Refills: 12 | Status: SHIPPED | OUTPATIENT
Start: 2022-04-21 | End: 2022-05-16 | Stop reason: SDUPTHER

## 2022-05-16 DIAGNOSIS — N52.9 ERECTILE DYSFUNCTION, UNSPECIFIED ERECTILE DYSFUNCTION TYPE: ICD-10-CM

## 2022-05-16 RX ORDER — SILDENAFIL 100 MG/1
100 TABLET, FILM COATED ORAL DAILY PRN
Qty: 15 TABLET | Refills: 0 | Status: SHIPPED | OUTPATIENT
Start: 2022-05-16 | End: 2022-07-05 | Stop reason: SDUPTHER

## 2022-07-05 DIAGNOSIS — N52.9 ERECTILE DYSFUNCTION, UNSPECIFIED ERECTILE DYSFUNCTION TYPE: ICD-10-CM

## 2022-07-05 RX ORDER — SILDENAFIL 100 MG/1
100 TABLET, FILM COATED ORAL DAILY PRN
Qty: 15 TABLET | Refills: 0 | Status: SHIPPED | OUTPATIENT
Start: 2022-07-05 | End: 2022-08-16 | Stop reason: SDUPTHER

## 2022-08-17 DIAGNOSIS — N52.9 ERECTILE DYSFUNCTION, UNSPECIFIED ERECTILE DYSFUNCTION TYPE: ICD-10-CM

## 2022-08-17 RX ORDER — SILDENAFIL 100 MG/1
100 TABLET, FILM COATED ORAL DAILY PRN
Qty: 15 TABLET | Refills: 0 | Status: CANCELLED | OUTPATIENT
Start: 2022-08-17

## 2022-09-16 DIAGNOSIS — N52.9 ERECTILE DYSFUNCTION, UNSPECIFIED ERECTILE DYSFUNCTION TYPE: ICD-10-CM

## 2022-09-16 RX ORDER — SILDENAFIL 100 MG/1
100 TABLET, FILM COATED ORAL DAILY PRN
Qty: 15 TABLET | Refills: 0 | Status: SHIPPED | OUTPATIENT
Start: 2022-09-16 | End: 2022-10-18 | Stop reason: SDUPTHER

## 2022-10-18 DIAGNOSIS — N52.9 ERECTILE DYSFUNCTION, UNSPECIFIED ERECTILE DYSFUNCTION TYPE: ICD-10-CM

## 2022-10-18 RX ORDER — SILDENAFIL 100 MG/1
100 TABLET, FILM COATED ORAL DAILY PRN
Qty: 15 TABLET | Refills: 0 | Status: SHIPPED | OUTPATIENT
Start: 2022-10-18

## 2022-11-25 DIAGNOSIS — N52.9 ERECTILE DYSFUNCTION, UNSPECIFIED ERECTILE DYSFUNCTION TYPE: ICD-10-CM

## 2022-11-28 DIAGNOSIS — N52.9 ERECTILE DYSFUNCTION, UNSPECIFIED ERECTILE DYSFUNCTION TYPE: ICD-10-CM

## 2022-11-28 RX ORDER — SILDENAFIL 100 MG/1
100 TABLET, FILM COATED ORAL DAILY PRN
Qty: 15 TABLET | Refills: 0 | Status: CANCELLED | OUTPATIENT
Start: 2022-11-28

## 2022-11-28 RX ORDER — SILDENAFIL 100 MG/1
100 TABLET, FILM COATED ORAL DAILY PRN
Qty: 15 TABLET | Refills: 0 | Status: SHIPPED | OUTPATIENT
Start: 2022-11-28

## 2022-12-02 ENCOUNTER — APPOINTMENT (OUTPATIENT)
Dept: LAB | Facility: HOSPITAL | Age: 52
End: 2022-12-02

## 2022-12-02 ENCOUNTER — OFFICE VISIT (OUTPATIENT)
Dept: FAMILY MEDICINE CLINIC | Facility: CLINIC | Age: 52
End: 2022-12-02

## 2022-12-02 VITALS
HEART RATE: 80 BPM | TEMPERATURE: 99.1 F | RESPIRATION RATE: 20 BRPM | SYSTOLIC BLOOD PRESSURE: 115 MMHG | OXYGEN SATURATION: 95 % | BODY MASS INDEX: 33.6 KG/M2 | DIASTOLIC BLOOD PRESSURE: 78 MMHG | HEIGHT: 64 IN | WEIGHT: 196.8 LBS

## 2022-12-02 DIAGNOSIS — E78.2 MIXED HYPERLIPIDEMIA: ICD-10-CM

## 2022-12-02 DIAGNOSIS — G89.29 CHRONIC LEFT SHOULDER PAIN: ICD-10-CM

## 2022-12-02 DIAGNOSIS — Z12.5 SCREENING FOR MALIGNANT NEOPLASM OF PROSTATE: ICD-10-CM

## 2022-12-02 DIAGNOSIS — R73.01 IMPAIRED FASTING GLUCOSE: ICD-10-CM

## 2022-12-02 DIAGNOSIS — Z11.59 NEED FOR HEPATITIS B SCREENING TEST: ICD-10-CM

## 2022-12-02 DIAGNOSIS — M25.512 CHRONIC LEFT SHOULDER PAIN: ICD-10-CM

## 2022-12-02 DIAGNOSIS — Z00.00 ANNUAL PHYSICAL EXAM: Primary | ICD-10-CM

## 2022-12-02 DIAGNOSIS — E55.9 VITAMIN D DEFICIENCY: ICD-10-CM

## 2022-12-02 LAB
25(OH)D3 SERPL-MCNC: 17.6 NG/ML (ref 30–100)
ALBUMIN SERPL BCP-MCNC: 4.6 G/DL (ref 3.5–5)
ALP SERPL-CCNC: 92 U/L (ref 43–122)
ALT SERPL W P-5'-P-CCNC: 50 U/L
ANION GAP SERPL CALCULATED.3IONS-SCNC: 8 MMOL/L (ref 5–14)
AST SERPL W P-5'-P-CCNC: 38 U/L (ref 17–59)
BILIRUB SERPL-MCNC: 0.85 MG/DL (ref 0.2–1)
BUN SERPL-MCNC: 17 MG/DL (ref 5–25)
CALCIUM SERPL-MCNC: 9.8 MG/DL (ref 8.4–10.2)
CHLORIDE SERPL-SCNC: 101 MMOL/L (ref 96–108)
CHOLEST SERPL-MCNC: 299 MG/DL
CO2 SERPL-SCNC: 31 MMOL/L (ref 21–32)
CREAT SERPL-MCNC: 1.05 MG/DL (ref 0.7–1.5)
GFR SERPL CREATININE-BSD FRML MDRD: 81 ML/MIN/1.73SQ M
GLUCOSE P FAST SERPL-MCNC: 108 MG/DL (ref 70–99)
HBV SURFACE AB SER-ACNC: 4.94 MIU/ML
HDLC SERPL-MCNC: 43 MG/DL
LDLC SERPL CALC-MCNC: 229 MG/DL
NONHDLC SERPL-MCNC: 256 MG/DL
POTASSIUM SERPL-SCNC: 3.9 MMOL/L (ref 3.5–5.3)
PROT SERPL-MCNC: 8.6 G/DL (ref 6.4–8.4)
PSA SERPL-MCNC: 0.8 NG/ML (ref 0–4)
SODIUM SERPL-SCNC: 140 MMOL/L (ref 135–147)
TRIGL SERPL-MCNC: 137 MG/DL

## 2022-12-02 RX ORDER — NAPROXEN 500 MG/1
500 TABLET ORAL 2 TIMES DAILY WITH MEALS
Qty: 30 TABLET | Refills: 0 | Status: SHIPPED | OUTPATIENT
Start: 2022-12-02

## 2022-12-03 LAB
EST. AVERAGE GLUCOSE BLD GHB EST-MCNC: 111 MG/DL
HBA1C MFR BLD: 5.5 %

## 2022-12-06 PROBLEM — R10.13 EPIGASTRIC PAIN: Status: RESOLVED | Noted: 2021-10-25 | Resolved: 2022-12-06

## 2022-12-06 PROBLEM — M25.512 CHRONIC LEFT SHOULDER PAIN: Status: ACTIVE | Noted: 2022-12-06

## 2022-12-06 PROBLEM — G89.29 CHRONIC LEFT SHOULDER PAIN: Status: ACTIVE | Noted: 2022-12-06

## 2022-12-07 RX ORDER — ERGOCALCIFEROL 1.25 MG/1
50000 CAPSULE ORAL WEEKLY
Qty: 12 CAPSULE | Refills: 0 | Status: SHIPPED | OUTPATIENT
Start: 2022-12-07

## 2022-12-07 RX ORDER — ATORVASTATIN CALCIUM 20 MG/1
20 TABLET, FILM COATED ORAL DAILY
Qty: 30 TABLET | Refills: 5 | Status: SHIPPED | OUTPATIENT
Start: 2022-12-07

## 2022-12-07 NOTE — ASSESSMENT & PLAN NOTE
Lab Results   Component Value Date    LDLCALC 229 (H) 12/02/2022     Very high LDL  Reviewed ASCVD Risk score 6 4%  Recommended statin  Patient has been improving diet/exercise  Likely to start statin pending upcoming labs and improvement with lifestyle management alone to determine dose

## 2022-12-07 NOTE — ASSESSMENT & PLAN NOTE
BMI Counseling: Body mass index is 33 78 kg/m²  The BMI is above normal  Nutrition recommendations include reducing portion sizes and reducing intake of cholesterol  Exercise recommendations include strength training exercises

## 2022-12-07 NOTE — ASSESSMENT & PLAN NOTE
Lab Results   Component Value Date    HGBA1C 5 5 12/02/2022     Improved diet  Check labs  Continue with lifestyle modifications

## 2022-12-20 ENCOUNTER — EVALUATION (OUTPATIENT)
Dept: PHYSICAL THERAPY | Facility: CLINIC | Age: 52
End: 2022-12-20

## 2022-12-20 DIAGNOSIS — M25.512 CHRONIC LEFT SHOULDER PAIN: ICD-10-CM

## 2022-12-20 DIAGNOSIS — G89.29 CHRONIC LEFT SHOULDER PAIN: ICD-10-CM

## 2022-12-20 NOTE — PROGRESS NOTES
PT Evaluation     Today's date: 2022  Patient name: Laly Dotson  : 1970  MRN: [id number]  Referring provider: Naty Jaime PA-C  Dx:   Encounter Diagnosis     ICD-10-CM    1  Chronic left shoulder pain  M25 512 Ambulatory Referral to Physical Therapy    G89 29           Start Time: 574  Stop Time: 1805  Total time in clinic (min): 20 minutes    Assessment/Plan  Assessment: Pt is a 47year old male presenting with chronic L shoulder pain  Symptom presentation and objective testing reveals likely L shoulder impingement  Impaired LUE strength indicates need for UE and postural strengthening  PROM limited by guarding today, pt pt will benefit from manual therapy to address shoulder mobility and pain  No red flags noted today  Initiated HEP and print out provided  Pt will benefit from physical therapy to address strength and pain secondary to L shoulder impingement in order to decrease pain and increase functional use of LUE for work related tasks as a   Impairments: L shoulder impingement, L shoulder weakness, L shoulder pain, pain with reaching    Pt understanding of Dx/POC: good  Prognosis: good    Goals:   1  Pt will report 25% reduction in pain in 2 weeks in order to demonstrate decreased pain and increased ability to participate in therapy  2  Pt will be independent in HEP in order to maximize success with therapy and ease with discharge  3  Pt will report pain free lifting overhead by the time of discharge in order to demonstrate improved shoulder strength and decreased shoulder pain  4  Pt will report pain free reaching behind the back by the time of discharge in order to demonstrate decreased pain and improved ability for bathing and dressing       Plan:   Interventions: manual therapy, shoulder strengthening, postural strengthening, modalities, HEP education  Frequency: 2x/wk  Duration 6 weeks    Subjective  HPI: Pt is a 47year old male presenting with chronic L shoulder pain that started back in May  Pain started after playing volleyball  Pt states pain has been coming and going, but is overall better than it was in May  Pain increases with certain motions with UEs extended when lifting or pulling  Pt describes pain as pulling along superior shoulder  Pt denies any UE numbness or tingling  Pt works as a  and uses LUE for majority of tasks, despite being R hand dominant  Pt denies any neck pain  Pt denies any significant medical history  Pain:   Location: L Shoulder  At worst: 7/10  Relieving factors: rest    Pt Goals:   - Decrease pain     Objective  Shoulder MMT:   Shoulder Flexion: L: 4-/5; R: 5/5  Shoulder Abduction: L: 3+/5 *pain, R: 5/5    Shoulder AROM:   Flexion: WNL b/l   Abduction: WNL b/l; but painful arc L   ER: WNL, discomfort with LUE  IRL T7 b/l, pain LUE    PROM: LUE flexion and abduction empty end feel due to guarding and reported pain     Special Tests:   Painful Arc: (+) L   Morrell: (+) L   Speeds: (+)        Precautions: none  Initial HEP Print Out: [employer]; Access Code: [email or web address]      Manuals 12/20            Shoulder PROM                                                    Neuro Re-Ed             HEP Education HD            Shoulder Stabilization              Ball on Wall             Body Blade             D2 Flexion                                       Ther Ex             Shoulder Flexion AAROM HEP            Prone Scap Retraction HEP            Prone T, Y             TB Rows/Ext             Supine Serratus Punch             Horizontal Abd             Shoulder PRE             UBE                          Gait Training                                       Modalities

## 2022-12-23 DIAGNOSIS — G89.29 CHRONIC LEFT SHOULDER PAIN: ICD-10-CM

## 2022-12-23 DIAGNOSIS — M25.512 CHRONIC LEFT SHOULDER PAIN: ICD-10-CM

## 2022-12-27 RX ORDER — NAPROXEN 500 MG/1
500 TABLET ORAL 2 TIMES DAILY WITH MEALS
Qty: 30 TABLET | Refills: 0 | Status: SHIPPED | OUTPATIENT
Start: 2022-12-27

## 2023-01-03 ENCOUNTER — OFFICE VISIT (OUTPATIENT)
Dept: PHYSICAL THERAPY | Facility: CLINIC | Age: 53
End: 2023-01-03

## 2023-01-03 DIAGNOSIS — G89.29 CHRONIC LEFT SHOULDER PAIN: Primary | ICD-10-CM

## 2023-01-03 DIAGNOSIS — M25.512 CHRONIC LEFT SHOULDER PAIN: Primary | ICD-10-CM

## 2023-01-03 NOTE — PROGRESS NOTES
Daily Note     Today's date: 1/3/2023  Patient name: Kimberley Peterson  : 1970  MRN: [id number]  Referring provider: Bill Flores PA-C  Dx:   Encounter Diagnosis     ICD-10-CM    1  Chronic left shoulder pain  M25 512     G89 29           Start Time: 1615  Stop Time: 1646  Total time in clinic (min): 31 minutes    Subjective: Pt states he feels a little better  Objective: See treatment diary below    Assessment: Tolerated treatment well  Requires some cues to prevent UT activation with prone strengthening  Patient demonstrated fatigue post treatment and would benefit from continued PT    Plan: Continue per plan of care  Progress treatment as tolerated  Precautions: none  Initial HEP Print Out: [employer]; Access Code: [email or web address]      Manuals 12/20 1/3           Shoulder PROM  HD                                                   Neuro Re-Ed             HEP Education HD            Shoulder Stabilization              Ball on Wall             Body Blade             D2 Flexion                                       Ther Ex             Shoulder Flexion AAROM HEP            Prone Scap Retraction HEP 3" 2x10           Prone T, Y  T 3" 2x10           TB Rows/Ext  GTB 2x10 ea           Supine Serratus Punch  5" 2x10 2#           Horizontal Abd  GTB 2x10            Shoulder PRE             UBE  2'/2'           No Money  2x10 GTB           Gait Training                                       Modalities

## 2023-01-23 ENCOUNTER — APPOINTMENT (OUTPATIENT)
Dept: PHYSICAL THERAPY | Facility: CLINIC | Age: 53
End: 2023-01-23

## 2023-01-31 DIAGNOSIS — M25.512 CHRONIC LEFT SHOULDER PAIN: ICD-10-CM

## 2023-01-31 DIAGNOSIS — G89.29 CHRONIC LEFT SHOULDER PAIN: ICD-10-CM

## 2023-01-31 RX ORDER — NAPROXEN 500 MG/1
500 TABLET ORAL 2 TIMES DAILY WITH MEALS
Qty: 30 TABLET | Refills: 0 | Status: SHIPPED | OUTPATIENT
Start: 2023-01-31

## 2023-02-28 DIAGNOSIS — M25.512 CHRONIC LEFT SHOULDER PAIN: ICD-10-CM

## 2023-02-28 DIAGNOSIS — G89.29 CHRONIC LEFT SHOULDER PAIN: ICD-10-CM

## 2023-02-28 DIAGNOSIS — E55.9 VITAMIN D DEFICIENCY: ICD-10-CM

## 2023-02-28 RX ORDER — NAPROXEN 500 MG/1
500 TABLET ORAL 2 TIMES DAILY WITH MEALS
Qty: 30 TABLET | Refills: 0 | Status: SHIPPED | OUTPATIENT
Start: 2023-02-28

## 2023-02-28 RX ORDER — ERGOCALCIFEROL 1.25 MG/1
50000 CAPSULE ORAL WEEKLY
Qty: 12 CAPSULE | Refills: 0 | Status: SHIPPED | OUTPATIENT
Start: 2023-02-28

## 2023-03-07 ENCOUNTER — TELEPHONE (OUTPATIENT)
Dept: UROLOGY | Facility: MEDICAL CENTER | Age: 53
End: 2023-03-07

## 2023-03-07 DIAGNOSIS — N52.9 ERECTILE DYSFUNCTION, UNSPECIFIED ERECTILE DYSFUNCTION TYPE: ICD-10-CM

## 2023-03-07 RX ORDER — SILDENAFIL 100 MG/1
100 TABLET, FILM COATED ORAL DAILY PRN
Qty: 15 TABLET | Refills: 0 | Status: SHIPPED | OUTPATIENT
Start: 2023-03-07 | End: 2023-03-23 | Stop reason: SDUPTHER

## 2023-03-07 NOTE — TELEPHONE ENCOUNTER
Patient seen by Carly Cortes at [address] [place]    Patient called and scheduled his yearly follow up but needs refills on his medication  sildenafil (VIAGRA) 100 mg tablet    At [facility]     Patient can be reached at [phone]

## 2023-03-23 ENCOUNTER — OFFICE VISIT (OUTPATIENT)
Dept: UROLOGY | Facility: CLINIC | Age: 53
End: 2023-03-23

## 2023-03-23 VITALS
HEART RATE: 64 BPM | BODY MASS INDEX: 35.87 KG/M2 | SYSTOLIC BLOOD PRESSURE: 126 MMHG | WEIGHT: 209 LBS | DIASTOLIC BLOOD PRESSURE: 80 MMHG

## 2023-03-23 DIAGNOSIS — N52.9 ERECTILE DYSFUNCTION, UNSPECIFIED ERECTILE DYSFUNCTION TYPE: Primary | ICD-10-CM

## 2023-03-23 DIAGNOSIS — N52.8 OTHER MALE ERECTILE DYSFUNCTION: ICD-10-CM

## 2023-03-23 RX ORDER — SILDENAFIL 100 MG/1
100 TABLET, FILM COATED ORAL DAILY PRN
Qty: 20 TABLET | Refills: 12 | Status: SHIPPED | OUTPATIENT
Start: 2023-03-23 | End: 2023-03-23

## 2023-03-23 RX ORDER — TADALAFIL 20 MG/1
20 TABLET ORAL DAILY PRN
Qty: 20 TABLET | Refills: 12 | Status: SHIPPED | OUTPATIENT
Start: 2023-03-23

## 2023-03-23 NOTE — PROGRESS NOTES
Assessment and plan:     Erectile dysfunction  · Begin Cialis 20 mg on demand  · Follow-up 6 months      YANELI Hendricks    History of Present Illness     Sony Soto is a 47 y o  male presents for 1 year follow-up after starting sildenafil for erectile dysfunction  He requires 100 mg which is effective  He previously denied any side effects from the medication  At this time he reports headache and some visual disturbance  He would like to try Cialis  We will send this to his pharmacy  His testosterone level was normal at 491  He also notes intermittent dizziness  Sometimes this occurs when he is getting up  He will keep track of when he takes the Cialis and I recommend changing his positions slowly  He will monitor his symptoms to determine if this is the cause  He previously had been diagnosed with vertigo prior to starting viagra     He undergoes prostate cancer screening through his PCP and would like to continue this  Laboratory     Lab Results   Component Value Date    BUN 17 12/02/2022    CREATININE 1 05 12/02/2022       No components found for: GFR    Lab Results   Component Value Date    CALCIUM 9 8 12/02/2022    K 3 9 12/02/2022    CO2 31 12/02/2022     12/02/2022       Lab Results   Component Value Date    WBC 5 70 02/04/2022    HGB 14 6 02/04/2022    HCT 43 9 02/04/2022    MCV 88 02/04/2022     02/04/2022       Lab Results   Component Value Date    PSA 0 8 12/02/2022    PSA 0 6 10/25/2021       No results found for this or any previous visit (from the past 1 hour(s))  @RESULT(URINEMICROSCOPIC)@    @RESULT(URINECULTURE)@    Radiology       Review of Systems     Review of Systems   Constitutional: Negative for activity change, appetite change, chills, fatigue, fever and unexpected weight change  HENT: Negative for facial swelling  Visual disturbance   Eyes: Negative for discharge  Respiratory: Negative  Negative for cough and shortness of breath  Cardiovascular: Negative for chest pain and leg swelling  Gastrointestinal: Negative  Negative for abdominal distention, abdominal pain, constipation, diarrhea, nausea and vomiting  Endocrine: Negative  Genitourinary: Negative  Negative for decreased urine volume, difficulty urinating, dysuria, enuresis, flank pain, frequency, genital sores, hematuria and urgency  Musculoskeletal: Negative for back pain and myalgias  Skin: Negative for pallor and rash  Allergic/Immunologic: Negative  Negative for immunocompromised state  Neurological: Positive for headaches  Negative for facial asymmetry and speech difficulty  Psychiatric/Behavioral: Negative for agitation and confusion  Allergies     No Known Allergies    Physical Exam     Physical Exam  Vitals reviewed  Constitutional:       General: He is not in acute distress  Appearance: Normal appearance  He is obese  He is not ill-appearing, toxic-appearing or diaphoretic  HENT:      Head: Normocephalic and atraumatic  Eyes:      General: No scleral icterus  Cardiovascular:      Rate and Rhythm: Normal rate  Pulmonary:      Effort: Pulmonary effort is normal  No respiratory distress  Abdominal:      General: Abdomen is flat  There is no distension  Musculoskeletal:         General: No swelling  Cervical back: Normal range of motion  Skin:     General: Skin is warm and dry  Coloration: Skin is not jaundiced or pale  Findings: No rash  Neurological:      General: No focal deficit present  Mental Status: He is alert and oriented to person, place, and time  Gait: Gait normal    Psychiatric:         Mood and Affect: Mood normal          Behavior: Behavior normal          Thought Content:  Thought content normal          Judgment: Judgment normal          Vital Signs     Vitals:    03/23/23 1521   BP: 126/80   Pulse: 64   Weight: 94 8 kg (209 lb)       Current Medications       Current Outpatient Medications: •  atorvastatin (LIPITOR) 20 mg tablet, Take 1 tablet (20 mg total) by mouth daily, Disp: 30 tablet, Rfl: 5  •  ergocalciferol (VITAMIN D2) 50,000 units, Take 1 capsule (50,000 Units total) by mouth once a week Take one capsule weekly by mouth, Disp: 12 capsule, Rfl: 0  •  naproxen (Naprosyn) 500 mg tablet, Take 1 tablet (500 mg total) by mouth 2 (two) times a day with meals, Disp: 30 tablet, Rfl: 0  •  tadalafil (CIALIS) 20 MG tablet, Take 1 tablet (20 mg total) by mouth daily as needed for erectile dysfunction Do not take more than 1 time in 24 hours    Do not take more than 2-3 times per week, Disp: 20 tablet, Rfl: 12  •  Vitamin D, Cholecalciferol, 50 MCG (2000 UT) CAPS, Take 2,000 mg by mouth in the morning, Disp: , Rfl:     Active Problems     Patient Active Problem List   Diagnosis   • Mixed hyperlipidemia   • Impaired fasting glucose   • Erectile dysfunction   • Vitamin D deficiency   • Vertigo   • Chronic left shoulder pain   • BMI 33 0-33 9,adult       Past Medical History     Past Medical History:   Diagnosis Date   • Elevated cholesterol    • Fingertip amputation     right 5th   • High cholesterol    • Keloid of skin 07/29/2019   • Pre-diabetes    • S/P cholecystectomy 01/14/2022       Surgical History     Past Surgical History:   Procedure Laterality Date   • CHOLECYSTECTOMY LAPAROSCOPIC N/A 1/11/2022    Procedure: CHOLECYSTECTOMY LAPAROSCOPIC;  Surgeon: Gladys Barriga MD;  Location: [facility];  Service: General   • COLONOSCOPY         Family History     Family History   Problem Relation Age of Onset   • Hypertension Mother    • No Known Problems Father    • No Known Problems Sister    • No Known Problems Brother    • No Known Problems Maternal Aunt    • No Known Problems Maternal Uncle    • No Known Problems Paternal Aunt    • No Known Problems Paternal Uncle    • No Known Problems Maternal Grandmother    • No Known Problems Maternal Grandfather    • No Known Problems Paternal Grandmother    • No Known Problems Paternal Grandfather    • ADD / ADHD Neg Hx    • Anesthesia problems Neg Hx    • Cancer Neg Hx    • Clotting disorder Neg Hx    • Collagen disease Neg Hx    • Diabetes Neg Hx    • Dislocations Neg Hx    • Learning disabilities Neg Hx    • Neurological problems Neg Hx    • Osteoporosis Neg Hx    • Rheumatologic disease Neg Hx    • Scoliosis Neg Hx    • Vascular Disease Neg Hx        Social History     Social History     Social History     Tobacco Use   Smoking Status Never   Smokeless Tobacco Never       Past Surgical History:   Procedure Laterality Date   • CHOLECYSTECTOMY LAPAROSCOPIC N/A 1/11/2022    Procedure: CHOLECYSTECTOMY LAPAROSCOPIC;  Surgeon: Vandana Godoy MD;  Location: [facility];  Service: General   • COLONOSCOPY           The following portions of the patient's history were reviewed and updated as appropriate: allergies, current medications, past family history, past medical history, past social history, past surgical history and problem list    Please note :  Voice dictation software has been used to create this document  There may be inadvertent transcription errors      [id number] [id number] Ahsan Ochoa

## 2023-03-23 NOTE — PATIENT INSTRUCTIONS
Tadalafilo (Por la boca)   Tadalafil ([id number])  Trata la disfunción eréctil y los síntomas de la hiperplasia prostática benigna (próstata agrandada)  Además trata la hipertensión arterial pulmonar (presión arterial wisam en los pulmones) para mejorar la capacidad de realizar ejercicio  Jamin(s) : Adelita Ozzy, Cialis   Existen muchas otras marcas de Jose  Alka medicamento no debe ser usado cuando:   Alka medicamento no es adecuado para todas las personas  No lo use si alguna vez ha tenido che reacción alérgica al tadalafilo  Forma de usar alka medicamento:   [place] samara medicamentos etta se le haya indicado  Es probable que sea necesario cambiar jacobs dosis varias veces hasta encontrar la que funciona mejor para usted  Trague la tableta entera  No la parta, quiebre, mastique ni triture  Use sólo la jamin del medicamento que el médico le ha prescrito  Es posible que otras marcas no tengan el mismo [place]  [place]anna y siga las instrucciones para el paciente que vienen con el medicamento  Hable con jacobs médico o farmacéutico si tiene alguna pregunta  Si olvida che dosis: [place] la dosis tan pronto etta lo recuerde  Si es miryam la hora para jacobs próxima dosis, espere hasta entonces para cassie jacobs dosis regular  No tome medicamento adicional para reponer la dosis que omitió  Guarde el [facility]  [employer] en un recipiente cerrado a temperatura ambiente y alejado del calor, la humedad y la marce directa  Medicamentos y [employer] que debe evitar:   Consulte con jacobs médico o farmacéutico antes de usar cualquier medicamento, incluyendo los que compra sin receta médica, las vitaminas y los productos herbales  No use alka medicamento si también está tomando riociguat o un medicamento a base de nitrato  Usted puede usar un medicamento de nitrato por lo menos 48 horas después de jacobs última dosis de tadalafilo   No tome otros medicamentos que contengan tadalafilo o medicamentos similares, etta sildenafilo o vardenafilo  Algunos alimentos y medicamentos pueden afectar el funcionamiento del tadalafilo  Informe a jacobs médico si está usando cualquiera de los siguientes:  Bosentán, carbamazepina, eritromicina, indinavir, itraconazol, ketoconazol, fenobarbital, fenotoína, rifampicina, ritonavir  Medicamentos para la presión arterial (incluyendo alfuzosina, amlodipina, bendroflumetiazida, candesartán, doxazosina, enalapril, losartán, metoprolol, tamsulosina, terazosina)  Medicamentos para tratar problemas de la próstata  No tome más 5 bebidas alcohólicas en un corto período mientras esté   No coma toronja ni tome jugo de toronja mientras esté   Precauciones barbara el uso de alka medicamento:   Informe a jacobs médico si usted está embarazada o amamantando, o si usted tiene enfermedad renal, enfermedad hepática, problemas pulmonares o respiratorios, diabetes, colesterol alto, cáncer (incluyendo leucemia, mieloma múltiple), anemia de células falciformes, problemas de sangrado, úlcera estomacal, problemas con el pene o con los ojos  Informe a jacobs médico si usted tiene angina o dolor en el pecho barbara el acto sexual, cardiopatía, problemas del ritmo cardíaco, presión arterial wisam o baja, o antecedentes de un ataque al corazón o un derrame cerebral  También infórmele a jacobs médico si usted fuma o marcus alcohol  Informe a todo médico que lo trate que usted está usando tadalafilo  Alka medicamento puede causar los siguientes problemas:   Presión arterial baja (sobre todo si se marcus con otros medicamentos que disminuyen la presión arterial)  Erección dolorosa o prolongada  Problemas de oído o vista  El médico solicitará exámenes de laboratorio barbara las citas de rutina para revisar los efectos de Jose  Asista a todas samara citas  Guarde todos los medicamentos fuera del alcance de los niños  Nunca comparta samara medicamentos con [employer]    Efectos secundarios que pueden presentarse barbara el uso de alexandro medicamento:   Consulte inmediatamente con el médico si nota cualquiera de estos efectos secundarios:  Reacción alérgica: Comezón o ronchas, hinchazón del lori o las rubio, hinchazón u hormigueo en la boca o garganta, opresión en el pecho, dificultad para respirar  Ampollas, despelleje, o sarpullido [place] en la piel  Dolor de pecho, dificultad para respirar  Desvanecimiento, desmayos  Erección dolorosa o que dure más de 4 horas (con o sin dolor)  Disminución o pérdida repentina de la audición, zumbido en los oídos, mareos  Pérdida repentina de la visión  Consulte con el médico si nota los siguientes efectos secundarios menos graves:   Dolor de espalda o muscular  Dolor de yumi  Congestión o goteo nasal  Malestar estomacal, náuseas  Enrojecimiento (rubor) o calor en el lori, martin, brazos o parte superior del pecho  Consulte con el médico si nota otros efectos secundarios que edwin son causados por alexandro medicamento  Llame a jacobs médico para consultarle Harish Hidalgo puede notificar samara efectos secundarios al [facility] al [phone]  © Copyright Jacelyn Freud 2022 Information is for End User's use only and may not be sold, redistributed or otherwise used for commercial purposes  Esta información es sólo para uso en educación  Jacobs intención no es darle un consejo médico sobre enfermedades o tratamientos  Colsulte con jacobs [place] farmacéutico antes de seguir cualquier régimen médico para saber si es seguro y efectivo para usted  Tadalafil (By mouth)   Tadalafil ([id number]-scott)  Treats erectile dysfunction and the symptoms of benign prostatic hyperplasia (enlarged prostate)  Also treats pulmonary arterial hypertension (high blood pressure in the lungs) to improve your exercise ability  Brand Name(s): Adcirca, Alyq, Cialis   There may be other brand names for this medicine  When This Medicine Should Not Be Used:    This medicine is not right for everyone  Do not use it if you had an allergic reaction to tadalafil  How to Use This Medicine:   Tablet  Take your medicine as directed  Your dose may need to be changed several times to find what works best for you  Swallow the tablet whole  Do not split, break, chew, or crush it  Use only the brand of medicine your doctor prescribed  Other brands may not work the same way  Read and follow the patient instructions that come with this medicine  Talk to your doctor or pharmacist if you have any questions  Missed dose: Take a dose as soon as you remember  If it is almost time for your next dose, wait until then and take a regular dose  Do not take extra medicine to make up for a missed dose  Store the medicine in a closed container at room temperature, away from heat, moisture, and direct light  Drugs and Foods to Avoid:   Ask your doctor or pharmacist before using any other medicine, including over-the-counter medicines, vitamins, and herbal products  Do not use this medicine if you are also taking riociguat or a nitrate medicine  You may use a nitrate medicine at least 48 hours after your last dose of tadalafil  Do not take other medicines that contain tadalafil or similar medicines, including sildenafil or vardenafil  Some foods and medicines can affect how tadalafil works  Tell your doctor if you are using any of the following:  Bosentan, carbamazepine, erythromycin, indinavir, itraconazole, ketoconazole, phenobarbital, phenytoin, rifampin, ritonavir  Blood pressure medicine (including alfuzosin, amlodipine, bendroflumethiazide, candesartan, doxazosin, enalapril, losartan, metoprolol, tamsulosin, terazosin)  Medicine to treat prostate problems  Do not have 5 or more alcohol-containing drinks in a short period of time while you are using this medicine  Do not eat grapefruit or drink grapefruit juice while you are using this medicine    Warnings While Using This Medicine:   Tell your doctor if you are pregnant or breastfeeding, or if you have kidney disease, liver disease, lung or breathing problems, diabetes, high cholesterol, cancer (including leukemia, multiple myeloma), sickle cell anemia, bleeding problems, stomach ulcer, problems with your penis, or eye problems  Tell your doctor if you have angina or chest pain during sex, heart disease, heart rhythm problems, high or low blood pressure, or a history of heart attack or stroke  Also tell your doctor if you smoke or drink alcohol  Tell any doctor who treats you that you are using tadalafil  This medicine may cause the following problems:   Low blood pressure (especially if taken with other medicines that lower blood pressure)  Painful or prolonged erection  Hearing or vision problems  Your doctor will do lab tests at regular visits to check on the effects of this medicine  Keep all appointments  Keep all medicine out of the reach of children  Never share your medicine with anyone  Possible Side Effects While Using This Medicine:   Call your doctor right away if you notice any of these side effects: Allergic reaction: Itching or hives, swelling in your face or hands, swelling or tingling in your mouth or throat, chest tightness, trouble breathing  Blistering, peeling, or red skin rash  Chest pain, trouble breathing  Lightheadedness, fainting  Painful erection or an erection that lasts longer than 4 hours with or without pain  Sudden decrease in hearing or hearing loss, ringing in the ears, dizziness  Sudden loss of vision  If you notice these less serious side effects, talk with your doctor:   Back or muscle pain  Headache  Stuffy or runny nose  Upset stomach, nausea  Warmth or redness in your face, neck, arms, or upper chest  If you notice other side effects that you think are caused by this medicine, tell your doctor  Call your doctor for medical advice about side effects   You may report side effects to FDA at 3-408-FDA-3424  © Copyright [employer] 2022 Information is for End User's use only and may not be sold, redistributed or otherwise used for commercial purposes  The above information is an  only  It is not intended as medical advice for individual conditions or treatments  Talk to your doctor, nurse or pharmacist before following any medical regimen to see if it is safe and effective for you  Erectile Dysfunction   WHAT YOU NEED TO KNOW:   What is erectile dysfunction (ED)?  ED, or impotence, is when you cannot get or keep an erection for sexual activity  What causes ED? Conditions that lead to nerve problems, such as a spinal cord injury, diabetes, or stroke    Hormone imbalances, such as low testosterone, high prolactin, or a thyroid disorder    Medical conditions that decrease blood flow, such as high blood pressure and arteriosclerosis    Multiple sclerosis or Parkinson disease    Medicines, such as those used to treat depression and high blood pressure    Injury to the testicles from radiation therapy to the testicles    What increases my risk for ED? Obesity    Diabetes that is not controlled or heart disease    Smoking, or drug or alcohol abuse    An enlarged prostate    Increasing age    Spine or groin surgeries    Stress, depression, relationship problems, and anxiety    How is ED diagnosed? Your healthcare provider will ask about your symptoms, medical history, and medicines  He or she will examine your abdomen, penis, and testicles  A rectal exam may also be done to check for an enlarged prostate  Blood and urine tests are done to check for medical conditions that may have caused your ED  You may also need tests to check your blood flow and nerve function  How is ED treated? Treatment depends on the cause of your ED  You may need any of the following:  ED medicines  help you have an erection  These medicines are taken before you have sex  Follow instructions on how to use these medicines   You may have a life-threatening reaction if you mix ED medicines with medicines that contain nitrates  Medicines with nitrates include nitroglycerin and other heart medicines  Testosterone  may be given to increase the levels in your blood and improve your ED  You may need to use a skin cream or wear a patch  Testosterone is also given as an injection  Penis injections  may be done to help improve your blood flow  A vacuum device  is a tube that is placed over the penis  A hand pump is connected to the tube and acts as a vacuum  This may help increase blood flow to the penis  Therapy  may be needed to treat emotional or relationship problems that may be causing your ED  Surgery  may be recommended if other treatments do not work  Surgery includes a penile implant or prosthesis  Surgery may also be done to improve blood flow  Ask for more information about surgeries for ED  How can I decrease my risk for ED? Do not smoke  Smoking can increase your risk for ED  Nicotine and other chemicals in cigarettes and cigars can also cause lung damage  Ask your healthcare provider for information if you currently smoke and need help to quit  E-cigarettes or smokeless tobacco still contain nicotine  Control your blood sugar levels if you have diabetes  Over time, high blood sugar levels can increase your risk for ED  Limit alcohol  Men should limit alcohol to 2 drinks a day  A drink of alcohol is 12 ounces of beer, 5 ounces of wine, or 1½ ounces of liquor  Manage your medical conditions  Eat a variety of healthy foods and stay physically active  Take your medicines as directed  They can help control conditions that may cause ED  Manage stress  Learn ways to relax, such as deep breathing, meditation, and listening to music  Do not use illegal drugs  They increase your risk for ED  When should I call my doctor?    You have chest pain, dizziness, or nausea after you take ED medicines or during or after sex  You have an erection for more than 4 hours after you take your ED medicine  You see blood in your urine  You have changes in your vision, headaches, or back pain after you take ED medicine  You have a painful erection  You have questions or concerns about your condition or care  CARE AGREEMENT:   You have the right to help plan your care  Learn about your health condition and how it may be treated  Discuss treatment options with your healthcare providers to decide what care you want to receive  You always have the right to refuse treatment  The above information is an  only  It is not intended as medical advice for individual conditions or treatments  Talk to your doctor, nurse or pharmacist before following any medical regimen to see if it is safe and effective for you  © Copyright Francisco You 2022 Information is for End User's use only and may not be sold, redistributed or otherwise used for commercial purposes  BLADDER HEALTH    WHAT IS CONSIDERED NORMAL? The average bladder can hold about 2 cups of urine before it needs to be emptied  The normal range of voiding urine is 6 to 8 times during a 24 hour period  As we get older, our bladder capacity can get smaller and we may need to pass urine more frequently but usually not more than every 2 hours  Urine should flow easily without discomfort in a good, steady stream until the bladder is empty  No pushing or straining is necessary to empty the bladder  An urge is a signal that you feel as the bladder stretches to fill with urine  Urges can be felt even if the bladder is not full  Urges are not commands to go to the toilet, merely a signal and can be controlled  WHAT ARE GOOD BLADDER HABITS? Take your time when emptying your bladder  Don’t strain or push to empty your bladder  Make sure you empty your bladder completely each time you pass urine  Do not rush the process       Consistently ignoring the urge to go (waiting more than 4 hours between toileting) or urinating too infrequently may be convenient but not healthy for your bladder  Avoid going to the toilet “just in case” or more often than every 2 hours  It is usually not necessary to go when you feel the first urge  Try to go only when your bladder is full  Urgency and frequency of urination can be improved by retraining the bladder and spacing your fluid intake throughout the day  Practice good toilet habits  Don’t let your bladder control your life  TIPS TO MAINTAIN GOOD BLADDER HABITS  Maintain a good fluid intake  Depending on your body size and environment, drink 6 -8 cups (8 oz each) of fluid per day unless otherwise advised by your doctor  Not enough fluid creates a foul odor and dark color of the urine  Limit the amount of caffeine (coffee, cola, chocolate or tea) and citrus foods that you consume as these foods can be associated with increased sensation of urinary urgency and frequency  Limit the amount of alcohol you drink  Alcohol increases urine production and makes it difficult for the brain to coordinate bladder control  Avoid constipation by maintaining a balanced diet of dietary fiber  Cigarette smoking is also irritating to the bladder surface and is associated with bladder cancer  In addition, the coughing associated with smoking may lead to increased incontinent episodes because of the increased pressure  HOW DIET CAN AFFECT YOUR BLADDER  Although there is no particular "diet" that can cure bladder control, there are certain dietary suggestions you can use to help control the problem  There are 2 points to consider when evaluating how your habits and diet may affect your bladder:    Foods and fluids can irritate the bladder  Some foods and beverages are thought to contribute to bladder leakage and irritability   However their effect on the bladder is not completely understood and you may want to see if eliminating one or all of these items improves your bladder control  If you are unable to give them up completely, it is recommended that you use the following items in moderation:  Acidic beverages and foods (orange juice, grapefruit juice, lemonade etc)  Alcoholic beverages  Vinegar  Coffee (regular and decaf)  Tea (regular and decaf)  Caffeinated beverages  Carbonated beverages          Drinking enough and the right kinds of fluids  Many people with bladder control issues decrease their intake of liquids in hope that they will need to urinate less frequently or have less urinary leakage  You should not restrict fluids to control your bladder  While a decrease in liquid intake does result in a decrease in the volume of urine, the smaller amount of urine may be more highly concentrated  Highly concentrated, dark yellow urine is irritating to the bladder surface and may actually cause you to go to the bathroom more frequently  It also encourages the growth of bacteria, which may lead to infections resulting in incontinence  Substitutions for Bladder Irritants: water is always the best beverage choice  Grape and apple juice are thirst quenchers are good selections and are not as irritating to the bladder    Low acid fruits:  Pears, apricots, papaya, watermelon  For coffee drinkers: KAVA®, Postum®, Wilber®, Kaffree Louise®  For tea drinkers:  non-citrus or herbal and sun brewed tea

## 2023-04-03 DIAGNOSIS — E78.2 MIXED HYPERLIPIDEMIA: ICD-10-CM

## 2023-04-03 RX ORDER — ATORVASTATIN CALCIUM 20 MG/1
20 TABLET, FILM COATED ORAL DAILY
Qty: 90 TABLET | Refills: 3 | Status: SHIPPED | OUTPATIENT
Start: 2023-04-03

## 2023-06-02 ENCOUNTER — OFFICE VISIT (OUTPATIENT)
Dept: FAMILY MEDICINE CLINIC | Facility: CLINIC | Age: 53
End: 2023-06-02
Payer: COMMERCIAL

## 2023-06-02 VITALS
BODY MASS INDEX: 34.08 KG/M2 | WEIGHT: 199.6 LBS | SYSTOLIC BLOOD PRESSURE: 116 MMHG | HEIGHT: 64 IN | HEART RATE: 84 BPM | RESPIRATION RATE: 17 BRPM | TEMPERATURE: 98.3 F | OXYGEN SATURATION: 98 % | DIASTOLIC BLOOD PRESSURE: 70 MMHG

## 2023-06-02 DIAGNOSIS — E55.9 VITAMIN D DEFICIENCY: ICD-10-CM

## 2023-06-02 DIAGNOSIS — E78.2 MIXED HYPERLIPIDEMIA: Primary | ICD-10-CM

## 2023-06-02 DIAGNOSIS — S09.92XA INJURY OF NOSE, INITIAL ENCOUNTER: ICD-10-CM

## 2023-06-02 PROCEDURE — 99214 OFFICE O/P EST MOD 30 MIN: CPT | Performed by: PHYSICIAN ASSISTANT

## 2023-06-02 RX ORDER — FLUTICASONE PROPIONATE 50 MCG
1 SPRAY, SUSPENSION (ML) NASAL DAILY
Qty: 9.9 ML | Refills: 0 | Status: SHIPPED | OUTPATIENT
Start: 2023-06-02

## 2023-06-02 NOTE — PROGRESS NOTES
Name: Sea Alexander      : 1970      MRN: [id number]  Encounter Provider: Juliana Garza PA-C  Encounter Date: 2023   Encounter department: [facility]     1  Mixed hyperlipidemia  Assessment & Plan:  Lab Results   Component Value Date    LDLCALC 229 (H) 2022     Very high LDL, started on atorvastatin since most recent labs, repeat lipid panel and consider increase dose  Reviewed 6 5% ASCVD Risk score  Encouraged low fat diet  Orders:  -     Comprehensive metabolic panel; Future  -     Lipid panel; Future    2  Vitamin D deficiency  Assessment & Plan:  Completed high dose x 6 months  Last vitamin D 17 6 in 2022  Repeat lab and start daily OTC vitamin D3 2000IU  Orders:  -     Vitamin D 25 hydroxy; Future    3  Injury of nose, initial encounter  Assessment & Plan:  Recently switched to night shift work, fell asleep on toilet and fell forward onto his nose  No LOC but significant injury causing nosebleed  Did not seek medical attention  Will check XR nasal bones due to difficulty breathing with nose after event and congestion x 2 months  Orders:  -     XR nasal bones; Future; Expected date: 2023  -     fluticasone (FLONASE) 50 mcg/act nasal spray; 1 spray into each nostril daily           Josephine Cardoza is a 47 y o  male with a h/o HLD, ED who presents as a 6 month follow-up after last physical  He started on atorvastatin without any issues  Tolerating well  He works as a  and intentionally losing weight 10 lb since last visit  He exercises frequently and has a home gym  He recently switched to night shift work which he enjoys because he has more days off but initially was difficult transition and fell asleep on toilet and hit his nose forward and thinks he broke it  Since then he feels like chronic congestion and difficulty breathing through nose  No prodrome or post ictal phase, no LOC with fall   No "dizziness, vision change  Review of Systems   Constitutional: Negative for fever and unexpected weight change  HENT: Positive for congestion  Negative for nosebleeds, sinus pressure and sinus pain  Respiratory: Negative for shortness of breath  Cardiovascular: Negative for chest pain  Neurological: Negative for dizziness, facial asymmetry, light-headedness and headaches  Current Outpatient Medications on File Prior to Visit   Medication Sig   • atorvastatin (LIPITOR) 20 mg tablet Take 1 tablet (20 mg total) by mouth daily   • tadalafil (CIALIS) 20 MG tablet Take 1 tablet (20 mg total) by mouth daily as needed for erectile dysfunction Do not take more than 1 time in 24 hours  Do not take more than 2-3 times per week   • Vitamin D, Cholecalciferol, 50 MCG (2000 UT) CAPS Take 2,000 mg by mouth in the morning       Objective     /70 (BP Location: Left arm, Patient Position: Sitting, Cuff Size: Large)   Pulse 84   Temp 98 3 °F (36 8 °C) (Tympanic)   Resp 17   Ht 5' 4\" (1 626 m)   Wt 90 5 kg (199 lb 9 6 oz)   SpO2 98%   BMI 34 26 kg/m²     Physical Exam  Vitals and nursing note reviewed  Constitutional:       Appearance: He is obese  HENT:      Head: Normocephalic and atraumatic  Nose: Congestion present  Comments: Slight septal deviation    Eyes:      Extraocular Movements: Extraocular movements intact  Pupils: Pupils are equal, round, and reactive to light  Cardiovascular:      Rate and Rhythm: Normal rate and regular rhythm  Pulses: Normal pulses  Heart sounds: Normal heart sounds  Pulmonary:      Effort: Pulmonary effort is normal       Breath sounds: Normal breath sounds  Skin:         Neurological:      Mental Status: He is alert and oriented to person, place, and time  Mental status is at baseline         Al Mckeon PA-C  "

## 2023-06-04 PROBLEM — S09.92XA INJURY OF NOSE: Status: ACTIVE | Noted: 2023-06-04

## 2023-06-05 ENCOUNTER — APPOINTMENT (OUTPATIENT)
Dept: RADIOLOGY | Facility: MEDICAL CENTER | Age: 53
End: 2023-06-05
Payer: COMMERCIAL

## 2023-06-05 ENCOUNTER — APPOINTMENT (OUTPATIENT)
Dept: LAB | Facility: MEDICAL CENTER | Age: 53
End: 2023-06-05
Payer: COMMERCIAL

## 2023-06-05 DIAGNOSIS — E55.9 VITAMIN D DEFICIENCY: ICD-10-CM

## 2023-06-05 DIAGNOSIS — E78.2 MIXED HYPERLIPIDEMIA: ICD-10-CM

## 2023-06-05 DIAGNOSIS — S09.92XA INJURY OF NOSE, INITIAL ENCOUNTER: ICD-10-CM

## 2023-06-05 LAB
25(OH)D3 SERPL-MCNC: 42 NG/ML (ref 30–100)
ALBUMIN SERPL BCP-MCNC: 3.3 G/DL (ref 3.5–5)
ALP SERPL-CCNC: 87 U/L (ref 46–116)
ALT SERPL W P-5'-P-CCNC: 45 U/L (ref 12–78)
ANION GAP SERPL CALCULATED.3IONS-SCNC: 2 MMOL/L (ref 4–13)
AST SERPL W P-5'-P-CCNC: 23 U/L (ref 5–45)
BILIRUB SERPL-MCNC: 0.55 MG/DL (ref 0.2–1)
BUN SERPL-MCNC: 15 MG/DL (ref 5–25)
CALCIUM ALBUM COR SERPL-MCNC: 9.3 MG/DL (ref 8.3–10.1)
CALCIUM SERPL-MCNC: 8.7 MG/DL (ref 8.3–10.1)
CHLORIDE SERPL-SCNC: 106 MMOL/L (ref 96–108)
CHOLEST SERPL-MCNC: 175 MG/DL
CO2 SERPL-SCNC: 28 MMOL/L (ref 21–32)
CREAT SERPL-MCNC: 1.06 MG/DL (ref 0.6–1.3)
GFR SERPL CREATININE-BSD FRML MDRD: 80 ML/MIN/1.73SQ M
GLUCOSE P FAST SERPL-MCNC: 117 MG/DL (ref 65–99)
HDLC SERPL-MCNC: 43 MG/DL
LDLC SERPL CALC-MCNC: 83 MG/DL (ref 0–100)
NONHDLC SERPL-MCNC: 132 MG/DL
POTASSIUM SERPL-SCNC: 3.8 MMOL/L (ref 3.5–5.3)
PROT SERPL-MCNC: 7.2 G/DL (ref 6.4–8.4)
SODIUM SERPL-SCNC: 136 MMOL/L (ref 135–147)
TRIGL SERPL-MCNC: 243 MG/DL

## 2023-06-05 PROCEDURE — 80053 COMPREHEN METABOLIC PANEL: CPT

## 2023-06-05 PROCEDURE — 82306 VITAMIN D 25 HYDROXY: CPT

## 2023-06-05 PROCEDURE — 70160 X-RAY EXAM OF NASAL BONES: CPT

## 2023-06-05 PROCEDURE — 80061 LIPID PANEL: CPT

## 2023-06-05 PROCEDURE — 36415 COLL VENOUS BLD VENIPUNCTURE: CPT

## 2023-06-05 NOTE — ASSESSMENT & PLAN NOTE
Lab Results   Component Value Date    LDLCALC 229 (H) 12/02/2022     Very high LDL, started on atorvastatin since most recent labs, repeat lipid panel and consider increase dose  Reviewed 6 5% ASCVD Risk score  Encouraged low fat diet

## 2023-06-05 NOTE — ASSESSMENT & PLAN NOTE
Completed high dose x 6 months  Last vitamin D 17 6 in 12/2022  Repeat lab and start daily OTC vitamin D3 2000IU

## 2023-06-05 NOTE — ASSESSMENT & PLAN NOTE
Recently switched to night shift work, fell asleep on toilet and fell forward onto his nose  No LOC but significant injury causing nosebleed  Did not seek medical attention  Will check XR nasal bones due to difficulty breathing with nose after event and congestion x 2 months

## 2023-07-27 DIAGNOSIS — N52.9 ERECTILE DYSFUNCTION, UNSPECIFIED ERECTILE DYSFUNCTION TYPE: ICD-10-CM

## 2023-07-27 RX ORDER — TADALAFIL 20 MG/1
TABLET ORAL
Qty: 30 TABLET | Refills: 3 | Status: SHIPPED | OUTPATIENT
Start: 2023-07-27

## 2023-08-02 ENCOUNTER — TELEPHONE (OUTPATIENT)
Dept: UROLOGY | Facility: CLINIC | Age: 53
End: 2023-08-02

## 2023-08-02 NOTE — TELEPHONE ENCOUNTER
Called patient via Language Line at [phone] and spoke with  # [phone], detailed voicemail was left at number on file in regards to needing to cancel appointment with Milan Turner at [facility] 8/8/23 at 3:30, asked patient to call back to reschedule appointment. [employer] message sent to patient as well. 06-Feb-2023 11:43

## 2023-08-22 DIAGNOSIS — E78.2 MIXED HYPERLIPIDEMIA: ICD-10-CM

## 2023-08-22 RX ORDER — ATORVASTATIN CALCIUM 20 MG/1
20 TABLET, FILM COATED ORAL DAILY
Qty: 90 TABLET | Refills: 0 | Status: SHIPPED | OUTPATIENT
Start: 2023-08-22

## 2023-10-19 DIAGNOSIS — N52.9 ERECTILE DYSFUNCTION, UNSPECIFIED ERECTILE DYSFUNCTION TYPE: ICD-10-CM

## 2023-10-19 RX ORDER — TADALAFIL 20 MG/1
TABLET ORAL
Qty: 30 TABLET | Refills: 1 | Status: SHIPPED | OUTPATIENT
Start: 2023-10-19

## 2023-10-20 ENCOUNTER — OFFICE VISIT (OUTPATIENT)
Dept: UROLOGY | Facility: CLINIC | Age: 53
End: 2023-10-20
Payer: COMMERCIAL

## 2023-10-20 VITALS
OXYGEN SATURATION: 91 % | HEIGHT: 64 IN | SYSTOLIC BLOOD PRESSURE: 128 MMHG | BODY MASS INDEX: 34.15 KG/M2 | DIASTOLIC BLOOD PRESSURE: 88 MMHG | HEART RATE: 86 BPM | WEIGHT: 200 LBS

## 2023-10-20 DIAGNOSIS — N52.9 ERECTILE DYSFUNCTION, UNSPECIFIED ERECTILE DYSFUNCTION TYPE: Primary | ICD-10-CM

## 2023-10-20 PROCEDURE — 99213 OFFICE O/P EST LOW 20 MIN: CPT | Performed by: PHYSICIAN ASSISTANT

## 2023-10-20 RX ORDER — TADALAFIL 5 MG/1
5 TABLET ORAL DAILY
Qty: 90 TABLET | Refills: 1 | Status: SHIPPED | OUTPATIENT
Start: 2023-10-20

## 2023-10-20 NOTE — PROGRESS NOTES
10/20/2023      Chief Complaint   Patient presents with    Follow-up     6 month/ ED         Assessment and Plan    47 y.o. male managed by AP team    1. ED  2. Prostate cancer screening    sexually active 3x per week  discussed daily cialis 5mg  can use 5-20 mg "on demand" dose one hour prior to sex if needed    f/u 1 yr or PRN        History of Present Illness  Sony Patel is a 47 y.o. male here for evaluation of 6-month follow-up for erectile dysfunction on as needed Cialis 20 mg and routine prostate cancer screening with stable low PSAs at 0.8. No new concerns. No voiding issues. felt the viagra was stronger but had headaches with it  asked if cialis has higher dose. he already takes 20mg          Review of Systems   Constitutional: Negative. Respiratory: Negative. Cardiovascular: Negative. Genitourinary:  Negative for decreased urine volume, difficulty urinating, dysuria, flank pain, frequency, hematuria and urgency. Musculoskeletal: Negative. Vitals  Vitals:    10/20/23 1044   BP: 128/88   BP Location: Left arm   Patient Position: Sitting   Cuff Size: Standard   Pulse: 86   SpO2: 91%   Weight: 90.7 kg (200 lb)   Height: 5' 4" (1.626 m)       Physical Exam  Vitals and nursing note reviewed. Constitutional:       General: He is not in acute distress. Appearance: Normal appearance. He is well-developed. He is not diaphoretic. HENT:      Head: Normocephalic and atraumatic. Pulmonary:      Effort: Pulmonary effort is normal.      Comments: No cough or audible wheeze  Abdominal:      General: There is no distension. Tenderness: There is no abdominal tenderness. There is no right CVA tenderness or left CVA tenderness. Musculoskeletal:      Right lower leg: No edema. Left lower leg: No edema. Skin:     General: Skin is warm and dry. Neurological:      Mental Status: He is alert and oriented to person, place, and time.       Gait: Gait normal.   Psychiatric: Speech: Speech normal.         Behavior: Behavior normal.           Past History  Past Medical History:   Diagnosis Date    Elevated cholesterol     Fingertip amputation     right 5th    High cholesterol     Keloid of skin 07/29/2019    Pre-diabetes     S/P cholecystectomy 01/14/2022     Social History     Socioeconomic History    Marital status: /Civil Union     Spouse name: None    Number of children: None    Years of education: None    Highest education level: None   Occupational History    None   Tobacco Use    Smoking status: Never    Smokeless tobacco: Never   Vaping Use    Vaping Use: Never used   Substance and Sexual Activity    Alcohol use: Yes     Comment: SOCIALLY    Drug use: No    Sexual activity: Yes     Partners: Female     Birth control/protection: None   Other Topics Concern    None   Social History Narrative    None     Social Determinants of Health     Financial Resource Strain: Not on file   Food Insecurity: Not on file   Transportation Needs: Not on file   Physical Activity: Not on file   Stress: Not on file   Social Connections: Not on file   Intimate Partner Violence: Not on file   Housing Stability: Not on file     Social History     Tobacco Use   Smoking Status Never   Smokeless Tobacco Never     Family History   Problem Relation Age of Onset    Hypertension Mother     No Known Problems Father     No Known Problems Sister     No Known Problems Brother     No Known Problems Maternal Aunt     No Known Problems Maternal Uncle     No Known Problems Paternal Aunt     No Known Problems Paternal Uncle     No Known Problems Maternal Grandmother     No Known Problems Maternal Grandfather     No Known Problems Paternal Grandmother     No Known Problems Paternal Grandfather     ADD / ADHD Neg Hx     Anesthesia problems Neg Hx     Cancer Neg Hx     Clotting disorder Neg Hx     Collagen disease Neg Hx     Diabetes Neg Hx     Dislocations Neg Hx     Learning disabilities Neg Hx Neurological problems Neg Hx     Osteoporosis Neg Hx     Rheumatologic disease Neg Hx     Scoliosis Neg Hx     Vascular Disease Neg Hx        The following portions of the patient's history were reviewed and updated as appropriate: allergies, current medications, past medical history, past social history, past surgical history and problem list.    Results  No results found for this or any previous visit (from the past 1 hour(s)).]  Lab Results   Component Value Date    PSA 0.8 12/02/2022    PSA 0.6 10/25/2021     Lab Results   Component Value Date    CALCIUM 8.7 06/05/2023    K 3.8 06/05/2023    CO2 28 06/05/2023     06/05/2023    BUN 15 06/05/2023    CREATININE 1.06 06/05/2023     Lab Results   Component Value Date    WBC 5.70 02/04/2022    HGB 14.6 02/04/2022    HCT 43.9 02/04/2022    MCV 88 02/04/2022     02/04/2022

## 2023-12-07 DIAGNOSIS — N52.9 ERECTILE DYSFUNCTION, UNSPECIFIED ERECTILE DYSFUNCTION TYPE: ICD-10-CM

## 2023-12-07 RX ORDER — TADALAFIL 20 MG/1
TABLET ORAL
Qty: 30 TABLET | Refills: 0 | Status: SHIPPED | OUTPATIENT
Start: 2023-12-07

## 2023-12-08 ENCOUNTER — OFFICE VISIT (OUTPATIENT)
Dept: FAMILY MEDICINE CLINIC | Facility: CLINIC | Age: 53
End: 2023-12-08
Payer: COMMERCIAL

## 2023-12-08 ENCOUNTER — APPOINTMENT (OUTPATIENT)
Dept: LAB | Facility: HOSPITAL | Age: 53
End: 2023-12-08
Payer: COMMERCIAL

## 2023-12-08 VITALS
OXYGEN SATURATION: 93 % | BODY MASS INDEX: 34.38 KG/M2 | SYSTOLIC BLOOD PRESSURE: 120 MMHG | WEIGHT: 201.4 LBS | RESPIRATION RATE: 17 BRPM | DIASTOLIC BLOOD PRESSURE: 73 MMHG | HEIGHT: 64 IN | TEMPERATURE: 98.1 F | HEART RATE: 67 BPM

## 2023-12-08 DIAGNOSIS — Z12.5 SCREENING FOR MALIGNANT NEOPLASM OF PROSTATE: ICD-10-CM

## 2023-12-08 DIAGNOSIS — E78.2 MIXED HYPERLIPIDEMIA: ICD-10-CM

## 2023-12-08 DIAGNOSIS — J01.40 ACUTE NON-RECURRENT PANSINUSITIS: ICD-10-CM

## 2023-12-08 DIAGNOSIS — E66.9 OBESITY (BMI 30.0-34.9): ICD-10-CM

## 2023-12-08 DIAGNOSIS — Z13.6 SCREENING FOR CARDIOVASCULAR CONDITION: ICD-10-CM

## 2023-12-08 DIAGNOSIS — Z00.00 ANNUAL PHYSICAL EXAM: Primary | ICD-10-CM

## 2023-12-08 PROBLEM — S09.92XA INJURY OF NOSE: Status: RESOLVED | Noted: 2023-06-04 | Resolved: 2023-12-08

## 2023-12-08 PROBLEM — E66.811 OBESITY (BMI 30.0-34.9): Status: ACTIVE | Noted: 2022-12-06

## 2023-12-08 LAB
ALBUMIN SERPL BCP-MCNC: 4.3 G/DL (ref 3.5–5)
ALP SERPL-CCNC: 79 U/L (ref 34–104)
ALT SERPL W P-5'-P-CCNC: 26 U/L (ref 7–52)
ANION GAP SERPL CALCULATED.3IONS-SCNC: 7 MMOL/L
AST SERPL W P-5'-P-CCNC: 18 U/L (ref 13–39)
BASOPHILS # BLD AUTO: 0.04 THOUSANDS/ÂΜL (ref 0–0.1)
BASOPHILS NFR BLD AUTO: 1 % (ref 0–1)
BILIRUB SERPL-MCNC: 0.5 MG/DL (ref 0.2–1)
BUN SERPL-MCNC: 13 MG/DL (ref 5–25)
CALCIUM SERPL-MCNC: 9.7 MG/DL (ref 8.4–10.2)
CHLORIDE SERPL-SCNC: 100 MMOL/L (ref 96–108)
CHOLEST SERPL-MCNC: 242 MG/DL
CO2 SERPL-SCNC: 29 MMOL/L (ref 21–32)
CREAT SERPL-MCNC: 0.98 MG/DL (ref 0.6–1.3)
EOSINOPHIL # BLD AUTO: 0.31 THOUSAND/ÂΜL (ref 0–0.61)
EOSINOPHIL NFR BLD AUTO: 4 % (ref 0–6)
ERYTHROCYTE [DISTWIDTH] IN BLOOD BY AUTOMATED COUNT: 13.3 % (ref 11.6–15.1)
GFR SERPL CREATININE-BSD FRML MDRD: 88 ML/MIN/1.73SQ M
GLUCOSE P FAST SERPL-MCNC: 103 MG/DL (ref 65–99)
HCT VFR BLD AUTO: 46.1 % (ref 36.5–49.3)
HDLC SERPL-MCNC: 42 MG/DL
HGB BLD-MCNC: 15.3 G/DL (ref 12–17)
IMM GRANULOCYTES # BLD AUTO: 0.02 THOUSAND/UL (ref 0–0.2)
IMM GRANULOCYTES NFR BLD AUTO: 0 % (ref 0–2)
LDLC SERPL CALC-MCNC: 174 MG/DL (ref 0–100)
LYMPHOCYTES # BLD AUTO: 2.86 THOUSANDS/ÂΜL (ref 0.6–4.47)
LYMPHOCYTES NFR BLD AUTO: 41 % (ref 14–44)
MCH RBC QN AUTO: 29.7 PG (ref 26.8–34.3)
MCHC RBC AUTO-ENTMCNC: 33.2 G/DL (ref 31.4–37.4)
MCV RBC AUTO: 90 FL (ref 82–98)
MONOCYTES # BLD AUTO: 0.54 THOUSAND/ÂΜL (ref 0.17–1.22)
MONOCYTES NFR BLD AUTO: 8 % (ref 4–12)
NEUTROPHILS # BLD AUTO: 3.28 THOUSANDS/ÂΜL (ref 1.85–7.62)
NEUTS SEG NFR BLD AUTO: 46 % (ref 43–75)
NONHDLC SERPL-MCNC: 200 MG/DL
NRBC BLD AUTO-RTO: 0 /100 WBCS
PLATELET # BLD AUTO: 326 THOUSANDS/UL (ref 149–390)
PMV BLD AUTO: 9.1 FL (ref 8.9–12.7)
POTASSIUM SERPL-SCNC: 3.8 MMOL/L (ref 3.5–5.3)
PROT SERPL-MCNC: 7.5 G/DL (ref 6.4–8.4)
PSA SERPL-MCNC: 0.66 NG/ML (ref 0–4)
RBC # BLD AUTO: 5.15 MILLION/UL (ref 3.88–5.62)
SODIUM SERPL-SCNC: 136 MMOL/L (ref 135–147)
TRIGL SERPL-MCNC: 131 MG/DL
WBC # BLD AUTO: 7.05 THOUSAND/UL (ref 4.31–10.16)

## 2023-12-08 PROCEDURE — 99214 OFFICE O/P EST MOD 30 MIN: CPT | Performed by: PHYSICIAN ASSISTANT

## 2023-12-08 PROCEDURE — 80061 LIPID PANEL: CPT

## 2023-12-08 PROCEDURE — 99396 PREV VISIT EST AGE 40-64: CPT | Performed by: PHYSICIAN ASSISTANT

## 2023-12-08 PROCEDURE — 85025 COMPLETE CBC W/AUTO DIFF WBC: CPT

## 2023-12-08 PROCEDURE — 80053 COMPREHEN METABOLIC PANEL: CPT

## 2023-12-08 PROCEDURE — G0103 PSA SCREENING: HCPCS

## 2023-12-08 PROCEDURE — 36415 COLL VENOUS BLD VENIPUNCTURE: CPT

## 2023-12-08 RX ORDER — AMOXICILLIN AND CLAVULANATE POTASSIUM 875; 125 MG/1; MG/1
1 TABLET, FILM COATED ORAL EVERY 12 HOURS SCHEDULED
Qty: 14 TABLET | Refills: 0 | Status: SHIPPED | OUTPATIENT
Start: 2023-12-08 | End: 2023-12-15

## 2023-12-08 RX ORDER — ATORVASTATIN CALCIUM 40 MG/1
40 TABLET, FILM COATED ORAL DAILY
Qty: 90 TABLET | Refills: 3 | Status: SHIPPED | OUTPATIENT
Start: 2023-12-08

## 2023-12-08 RX ORDER — METHYLPREDNISOLONE 4 MG/1
TABLET ORAL
Qty: 21 EACH | Refills: 0 | Status: SHIPPED | OUTPATIENT
Start: 2023-12-08

## 2023-12-08 NOTE — ASSESSMENT & PLAN NOTE
Lab Results   Component Value Date    CHOLESTEROL 242 (H) 12/08/2023    CHOLESTEROL 175 06/05/2023    CHOLESTEROL 299 (H) 12/02/2022     Lab Results   Component Value Date    HDL 42 12/08/2023    HDL 43 06/05/2023    HDL 43 12/02/2022     Lab Results   Component Value Date    TRIG 131 12/08/2023    TRIG 243 (H) 06/05/2023    TRIG 137 12/02/2022     Lab Results   Component Value Date    NONHDLC 200 12/08/2023    [address] 132 06/05/2023    [address] 256 12/02/2022     Lab Results   Component Value Date    LDLCALC 174 (H) 12/08/2023     Improved with atorvastatin previously, still with high trigs. Start high dose 40mg daily. Reviewed 5.7% ASCVD risk score.

## 2023-12-08 NOTE — PROGRESS NOTES
ADULT ANNUAL PHYSICAL  [address] [facility]    NAME: Sony Martínez  AGE: 47 y.o. SEX: male  : 1970     DATE: 2023     Assessment and Plan:     Problem List Items Addressed This Visit        Other    Mixed hyperlipidemia     Lab Results   Component Value Date    CHOLESTEROL 242 (H) 2023    CHOLESTEROL 175 2023    CHOLESTEROL 299 (H) 2022     Lab Results   Component Value Date    HDL 42 2023    HDL 43 2023    HDL 43 2022     Lab Results   Component Value Date    TRIG 131 2023    TRIG 243 (H) 2023    TRIG 137 2022     Lab Results   Component Value Date    NONHDLC 200 2023    [address] 132 2023    [address] 256 2022     Lab Results   Component Value Date    LDLCALC 174 (H) 2023     Improved with atorvastatin previously, still with high trigs. Start high dose 40mg daily. Reviewed 5.7% ASCVD risk score. Relevant Medications    atorvastatin (LIPITOR) 40 mg tablet    Other Relevant Orders    Lipid panel (Completed)    Obesity (BMI 30.0-34. 9)     BMI Counseling: Body mass index is 34.57 kg/m². The BMI is above normal. Nutrition recommendations include reducing portion sizes, decreasing overall calorie intake, 3-5 servings of fruits/vegetables daily, and reducing fast food intake. Exercise recommendations include exercising 3-5 times per week and strength training exercises.         Other Visit Diagnoses     Annual physical exam    -  Primary    Acute non-recurrent pansinusitis        x several weeks worsening, improves slightly with floanse, reviewed previous XR nasal with sinusitis, will treat empirically with augmentin/medrol dose pack    Relevant Medications    amoxicillin-clavulanate (AUGMENTIN) 875-125 mg per tablet    methylPREDNISolone 4 MG tablet therapy pack    Screening for malignant neoplasm of prostate        Relevant Orders    PSA, Total Screen Screening for cardiovascular condition        Relevant Orders    CBC and differential (Completed)    Comprehensive metabolic panel (Completed)            Immunizations and preventive care screenings were discussed with patient today. Appropriate education was printed on patient's after visit summary. Discussed risks and benefits of prostate cancer screening. We discussed the controversial history of PSA screening for prostate cancer in the [facility] as well as the risk of over detection and over treatment of prostate cancer by way of PSA screening. The patient understands that PSA blood testing is an imperfect way to screen for prostate cancer and that elevated PSA levels in the blood may also be caused by infection, inflammation, prostatic trauma or manipulation, urological procedures, or by benign prostatic enlargement. The role of the digital rectal examination in prostate cancer screening was also discussed and I discussed with him that there is large interobserver variability in the findings of digital rectal examination. Counseling:  Alcohol/drug use: discussed moderation in alcohol intake, the recommendations for healthy alcohol use, and avoidance of illicit drug use. Dental Health: discussed importance of regular tooth brushing, flossing, and dental visits. Injury prevention: discussed safety/seat belts, safety helmets, smoke detectors, carbon dioxide detectors, and smoking near bedding or upholstery. Sexual health: discussed sexually transmitted diseases, partner selection, use of condoms, avoidance of unintended pregnancy, and contraceptive alternatives. Exercise: the importance of regular exercise/physical activity was discussed. Recommend exercise 3-5 times per week for at least 30 minutes. Depression Screening and Follow-up Plan: Patient was screened for depression during today's encounter. They screened negative with a PHQ-2 score of 0.         Return in about 1 year (around 12/8/2024), or if symptoms worsen or fail to improve, for Annual physical.     Chief Complaint:     Chief Complaint   Patient presents with   • Physical Exam      History of Present Illness:     Adult Annual Physical   Patient here for a comprehensive physical exam. The patient reports problems - nasal congestion . Was eating hot wings on Saturday night, then Sunday morning felt food bolus, like food stuck lasted 3-4 days, getting better today, able to swallow liquids without issues. Flonase helps for a little then congestion returns, no allergies. Beer and wine 1-2 times a month. Diet and Physical Activity  Diet/Nutrition: well balanced diet. Exercise: moderate cardiovascular exercise and 3-4 times a week on average. Depression Screening  PHQ-2/9 Depression Screening    Little interest or pleasure in doing things: 0 - not at all  Feeling down, depressed, or hopeless: 0 - not at all  PHQ-2 Score: 0  PHQ-2 Interpretation: Negative depression screen       General Health  Sleep: sleeps well. Hearing: normal - bilateral.  Vision: no vision problems. Dental: regular dental visits and brushes teeth twice daily. Planning for dental implant        Health  Symptoms include: none    Advanced Care Planning  Do you have an advanced directive? no  Do you have a durable medical power of ? no     Review of Systems:     Review of Systems   Constitutional:  Negative for chills and fever. HENT:  Negative for ear pain and sore throat. Eyes:  Negative for pain and visual disturbance. Respiratory:  Negative for cough and shortness of breath. Cardiovascular:  Negative for chest pain and palpitations. Gastrointestinal:  Negative for abdominal pain and vomiting. Genitourinary:  Negative for dysuria and hematuria. Musculoskeletal:  Negative for arthralgias and back pain. Skin:  Negative for color change and rash. Neurological:  Negative for seizures and syncope.    All other systems reviewed and are negative.      Past Medical History:     Past Medical History:   Diagnosis Date   • Elevated cholesterol    • Fingertip amputation     right 5th   • High cholesterol    • Keloid of skin 07/29/2019   • Pre-diabetes    • S/P cholecystectomy 01/14/2022      Past Surgical History:     Past Surgical History:   Procedure Laterality Date   • CHOLECYSTECTOMY LAPAROSCOPIC N/A 1/11/2022    Procedure: CHOLECYSTECTOMY LAPAROSCOPIC;  Surgeon: Mike Araya MD;  Location: [facility] OR;  Service: General   • COLONOSCOPY        Family History:     Family History   Problem Relation Age of Onset   • Hypertension Mother    • No Known Problems Father    • No Known Problems Sister    • No Known Problems Brother    • No Known Problems Maternal Aunt    • No Known Problems Maternal Uncle    • No Known Problems Paternal Aunt    • No Known Problems Paternal Uncle    • No Known Problems Maternal Grandmother    • No Known Problems Maternal Grandfather    • No Known Problems Paternal Grandmother    • No Known Problems Paternal Grandfather    • ADD / ADHD Neg Hx    • Anesthesia problems Neg Hx    • Cancer Neg Hx    • Clotting disorder Neg Hx    • Collagen disease Neg Hx    • Diabetes Neg Hx    • Dislocations Neg Hx    • Learning disabilities Neg Hx    • Neurological problems Neg Hx    • Osteoporosis Neg Hx    • Rheumatologic disease Neg Hx    • Scoliosis Neg Hx    • Vascular Disease Neg Hx       Social History:     Social History     Socioeconomic History   • Marital status: /Civil Union     Spouse name: None   • Number of children: None   • Years of education: None   • Highest education level: None   Occupational History   • None   Tobacco Use   • Smoking status: Never   • Smokeless tobacco: Never   Vaping Use   • Vaping Use: Never used   Substance and Sexual Activity   • Alcohol use: Yes     Comment: SOCIALLY   • Drug use: No   • Sexual activity: Yes     Partners: Female     Birth control/protection: None   Other Topics Concern • None   Social History Narrative   • None     Social Determinants of Health     Financial Resource Strain: Not on file   Food Insecurity: Not on file   Transportation Needs: Not on file   Physical Activity: Not on file   Stress: Not on file   Social Connections: Not on file   Intimate Partner Violence: Not on file   Housing Stability: Not on file      Current Medications:     Current Outpatient Medications   Medication Sig Dispense Refill   • amoxicillin-clavulanate (AUGMENTIN) 875-125 mg per tablet Take 1 tablet by mouth every 12 (twelve) hours for 7 days 14 tablet 0   • atorvastatin (LIPITOR) 40 mg tablet Take 1 tablet (40 mg total) by mouth daily 90 tablet 3   • fluticasone (FLONASE) 50 mcg/act nasal spray 1 spray into each nostril daily 9.9 mL 0   • methylPREDNISolone 4 MG tablet therapy pack Use as directed on package 21 each 0   • tadalafil (CIALIS) 20 MG tablet Take one tablet one hour before sex 30 tablet 0   • tadalafil (CIALIS) 5 MG tablet Take 1 tablet (5 mg total) by mouth in the morning 90 tablet 1   • Vitamin D, Cholecalciferol, 50 MCG (2000 UT) CAPS Take 2,000 mg by mouth in the morning       No current facility-administered medications for this visit. Allergies:     No Known Allergies   Physical Exam:     /73 (BP Location: Left arm, Patient Position: Sitting, Cuff Size: Standard)   Pulse 67   Temp 98.1 °F (36.7 °C) (Tympanic)   Resp 17   Ht 5' 4" (1.626 m)   Wt 91.4 kg (201 lb 6.4 oz)   SpO2 93%   BMI 34.57 kg/m²     Physical Exam  Vitals and nursing note reviewed. Constitutional:       General: He is not in acute distress. Appearance: He is well-developed. HENT:      Head: Normocephalic and atraumatic. Right Ear: Tympanic membrane, ear canal and external ear normal.      Left Ear: Tympanic membrane, ear canal and external ear normal.   Eyes:      Extraocular Movements: Extraocular movements intact.       Conjunctiva/sclera: Conjunctivae normal.      Pupils: Pupils are equal, round, and reactive to light. Cardiovascular:      Rate and Rhythm: Normal rate and regular rhythm. Heart sounds: No murmur heard. Pulmonary:      Effort: Pulmonary effort is normal. No respiratory distress. Breath sounds: Normal breath sounds. Abdominal:      Palpations: Abdomen is soft. Tenderness: There is no abdominal tenderness. Musculoskeletal:         General: No swelling. Cervical back: Neck supple. Skin:     General: Skin is warm and dry. Capillary Refill: Capillary refill takes less than 2 seconds. Neurological:      Mental Status: He is alert.    Psychiatric:         Mood and Affect: Mood normal.          Mary Lugo PA-C  [address]

## 2023-12-08 NOTE — ASSESSMENT & PLAN NOTE
BMI Counseling: Body mass index is 34.57 kg/m². The BMI is above normal. Nutrition recommendations include reducing portion sizes, decreasing overall calorie intake, 3-5 servings of fruits/vegetables daily, and reducing fast food intake. Exercise recommendations include exercising 3-5 times per week and strength training exercises.

## 2023-12-19 ENCOUNTER — APPOINTMENT (EMERGENCY)
Dept: CT IMAGING | Facility: HOSPITAL | Age: 53
End: 2023-12-19
Payer: COMMERCIAL

## 2023-12-19 ENCOUNTER — APPOINTMENT (EMERGENCY)
Dept: RADIOLOGY | Facility: HOSPITAL | Age: 53
End: 2023-12-19
Payer: COMMERCIAL

## 2023-12-19 ENCOUNTER — HOSPITAL ENCOUNTER (EMERGENCY)
Facility: HOSPITAL | Age: 53
Discharge: HOME | End: 2023-12-19
Attending: EMERGENCY MEDICINE | Admitting: EMERGENCY MEDICINE
Payer: COMMERCIAL

## 2023-12-19 VITALS
HEIGHT: 64 IN | BODY MASS INDEX: 34.44 KG/M2 | WEIGHT: 201.72 LBS | TEMPERATURE: 98 F | DIASTOLIC BLOOD PRESSURE: 64 MMHG | OXYGEN SATURATION: 95 % | RESPIRATION RATE: 19 BRPM | HEART RATE: 66 BPM | SYSTOLIC BLOOD PRESSURE: 111 MMHG

## 2023-12-19 DIAGNOSIS — K21.9 GERD (GASTROESOPHAGEAL REFLUX DISEASE): ICD-10-CM

## 2023-12-19 DIAGNOSIS — R07.9 CHEST PAIN: Primary | ICD-10-CM

## 2023-12-19 DIAGNOSIS — M54.9 BACK PAIN: ICD-10-CM

## 2023-12-19 LAB
2HR DELTA HS TROPONIN: 0 NG/L
ALBUMIN SERPL BCP-MCNC: 4.2 G/DL (ref 3.5–5)
ALP SERPL-CCNC: 90 U/L (ref 34–104)
ALT SERPL W P-5'-P-CCNC: 29 U/L (ref 7–52)
ANION GAP SERPL CALCULATED.3IONS-SCNC: 8 MMOL/L
AST SERPL W P-5'-P-CCNC: 17 U/L (ref 13–39)
ATRIAL RATE: 66 BPM
ATRIAL RATE: 82 BPM
BASOPHILS # BLD AUTO: 0.04 THOUSANDS/ÂΜL (ref 0–0.1)
BASOPHILS NFR BLD AUTO: 0 % (ref 0–1)
BILIRUB SERPL-MCNC: 0.83 MG/DL (ref 0.2–1)
BUN SERPL-MCNC: 11 MG/DL (ref 5–25)
CALCIUM SERPL-MCNC: 9.5 MG/DL (ref 8.4–10.2)
CARDIAC TROPONIN I PNL SERPL HS: 2 NG/L
CARDIAC TROPONIN I PNL SERPL HS: 2 NG/L
CHLORIDE SERPL-SCNC: 101 MMOL/L (ref 96–108)
CO2 SERPL-SCNC: 25 MMOL/L (ref 21–32)
CREAT SERPL-MCNC: 0.89 MG/DL (ref 0.6–1.3)
EOSINOPHIL # BLD AUTO: 0.29 THOUSAND/ÂΜL (ref 0–0.61)
EOSINOPHIL NFR BLD AUTO: 3 % (ref 0–6)
ERYTHROCYTE [DISTWIDTH] IN BLOOD BY AUTOMATED COUNT: 12.8 % (ref 11.6–15.1)
GFR SERPL CREATININE-BSD FRML MDRD: 98 ML/MIN/1.73SQ M
GLUCOSE SERPL-MCNC: 108 MG/DL (ref 65–140)
HCT VFR BLD AUTO: 48 % (ref 36.5–49.3)
HGB BLD-MCNC: 15.6 G/DL (ref 12–17)
IMM GRANULOCYTES # BLD AUTO: 0.03 THOUSAND/UL (ref 0–0.2)
IMM GRANULOCYTES NFR BLD AUTO: 0 % (ref 0–2)
LIPASE SERPL-CCNC: 21 U/L (ref 11–82)
LYMPHOCYTES # BLD AUTO: 3.08 THOUSANDS/ÂΜL (ref 0.6–4.47)
LYMPHOCYTES NFR BLD AUTO: 33 % (ref 14–44)
MCH RBC QN AUTO: 29.3 PG (ref 26.8–34.3)
MCHC RBC AUTO-ENTMCNC: 32.5 G/DL (ref 31.4–37.4)
MCV RBC AUTO: 90 FL (ref 82–98)
MONOCYTES # BLD AUTO: 0.62 THOUSAND/ÂΜL (ref 0.17–1.22)
MONOCYTES NFR BLD AUTO: 7 % (ref 4–12)
NEUTROPHILS # BLD AUTO: 5.19 THOUSANDS/ÂΜL (ref 1.85–7.62)
NEUTS SEG NFR BLD AUTO: 57 % (ref 43–75)
NRBC BLD AUTO-RTO: 0 /100 WBCS
P AXIS: 54 DEGREES
P AXIS: 54 DEGREES
PLATELET # BLD AUTO: 326 THOUSANDS/UL (ref 149–390)
PMV BLD AUTO: 9.3 FL (ref 8.9–12.7)
POTASSIUM SERPL-SCNC: 3.9 MMOL/L (ref 3.5–5.3)
PR INTERVAL: 152 MS
PR INTERVAL: 158 MS
PROT SERPL-MCNC: 7.6 G/DL (ref 6.4–8.4)
QRS AXIS: 69 DEGREES
QRS AXIS: 94 DEGREES
QRSD INTERVAL: 82 MS
QRSD INTERVAL: 98 MS
QT INTERVAL: 374 MS
QT INTERVAL: 402 MS
QTC INTERVAL: 421 MS
QTC INTERVAL: 436 MS
RBC # BLD AUTO: 5.32 MILLION/UL (ref 3.88–5.62)
SODIUM SERPL-SCNC: 134 MMOL/L (ref 135–147)
T WAVE AXIS: 15 DEGREES
T WAVE AXIS: 21 DEGREES
VENTRICULAR RATE: 66 BPM
VENTRICULAR RATE: 82 BPM
WBC # BLD AUTO: 9.25 THOUSAND/UL (ref 4.31–10.16)

## 2023-12-19 PROCEDURE — 84484 ASSAY OF TROPONIN QUANT: CPT | Performed by: EMERGENCY MEDICINE

## 2023-12-19 PROCEDURE — C9113 INJ PANTOPRAZOLE SODIUM, VIA: HCPCS | Performed by: EMERGENCY MEDICINE

## 2023-12-19 PROCEDURE — 74174 CTA ABD&PLVS W/CONTRAST: CPT

## 2023-12-19 PROCEDURE — 96374 THER/PROPH/DIAG INJ IV PUSH: CPT

## 2023-12-19 PROCEDURE — 71045 X-RAY EXAM CHEST 1 VIEW: CPT

## 2023-12-19 PROCEDURE — 71275 CT ANGIOGRAPHY CHEST: CPT

## 2023-12-19 PROCEDURE — 99285 EMERGENCY DEPT VISIT HI MDM: CPT

## 2023-12-19 PROCEDURE — 93005 ELECTROCARDIOGRAM TRACING: CPT

## 2023-12-19 PROCEDURE — 83690 ASSAY OF LIPASE: CPT | Performed by: EMERGENCY MEDICINE

## 2023-12-19 PROCEDURE — 99285 EMERGENCY DEPT VISIT HI MDM: CPT | Performed by: EMERGENCY MEDICINE

## 2023-12-19 PROCEDURE — 85025 COMPLETE CBC W/AUTO DIFF WBC: CPT | Performed by: EMERGENCY MEDICINE

## 2023-12-19 PROCEDURE — 80053 COMPREHEN METABOLIC PANEL: CPT | Performed by: EMERGENCY MEDICINE

## 2023-12-19 PROCEDURE — 36415 COLL VENOUS BLD VENIPUNCTURE: CPT

## 2023-12-19 RX ORDER — FAMOTIDINE 20 MG/1
20 TABLET, FILM COATED ORAL 2 TIMES DAILY
Qty: 20 TABLET | Refills: 0 | Status: SHIPPED | OUTPATIENT
Start: 2023-12-19 | End: 2023-12-29

## 2023-12-19 RX ORDER — ACETAMINOPHEN 325 MG/1
975 TABLET ORAL ONCE
Status: DISCONTINUED | OUTPATIENT
Start: 2023-12-19 | End: 2023-12-19

## 2023-12-19 RX ORDER — ACETAMINOPHEN 325 MG/1
650 TABLET ORAL ONCE
Status: COMPLETED | OUTPATIENT
Start: 2023-12-19 | End: 2023-12-19

## 2023-12-19 RX ORDER — MAGNESIUM HYDROXIDE/ALUMINUM HYDROXICE/SIMETHICONE 120; 1200; 1200 MG/30ML; MG/30ML; MG/30ML
30 SUSPENSION ORAL ONCE
Status: COMPLETED | OUTPATIENT
Start: 2023-12-19 | End: 2023-12-19

## 2023-12-19 RX ORDER — PANTOPRAZOLE SODIUM 40 MG/10ML
40 INJECTION, POWDER, LYOPHILIZED, FOR SOLUTION INTRAVENOUS ONCE
Status: COMPLETED | OUTPATIENT
Start: 2023-12-19 | End: 2023-12-19

## 2023-12-19 RX ADMIN — ALUMINUM HYDROXIDE, MAGNESIUM HYDROXIDE, AND SIMETHICONE 30 ML: 200; 200; 20 SUSPENSION ORAL at 12:21

## 2023-12-19 RX ADMIN — ACETAMINOPHEN 325MG 650 MG: 325 TABLET ORAL at 12:21

## 2023-12-19 RX ADMIN — IOHEXOL 100 ML: 350 INJECTION, SOLUTION INTRAVENOUS at 12:37

## 2023-12-19 RX ADMIN — PANTOPRAZOLE SODIUM 40 MG: 40 INJECTION, POWDER, FOR SOLUTION INTRAVENOUS at 12:20

## 2023-12-19 NOTE — DISCHARGE INSTRUCTIONS
This follow-up with your family doctor for further evaluation for chest pain and GERD symptoms, you may need outpatient stress test, and possible gastroenterology follow-up for persisting symptoms.

## 2023-12-19 NOTE — ED PROVIDER NOTES
History  Chief Complaint   Patient presents with    Chest Pain     Pt c/o 10/10 intermittent midsternal CP that radiates to back and ABD and increases with activity since  night unrelieved with tylenol.         History provided by:  Patient   used: No    Chest Pain  Pain location:  Substernal area  Pain quality: aching    Pain radiates to:  Does not radiate  Pain radiates to the back: yes    Pain severity:  Moderate  Onset quality:  Gradual  Duration:  4 days  Timing:  Intermittent  Progression:  Waxing and waning  Chronicity:  New  Context: at rest    Relieved by:  Nothing  Worsened by:  Nothing tried  Ineffective treatments:  None tried  Associated symptoms: abdominal pain    Associated symptoms: no back pain, no cough, no diaphoresis, no dizziness, no dysphagia, no fever, no headache, no nausea, no palpitations, no shortness of breath, not vomiting and no weakness    Associated symptoms comment:  Upper abd and bilateral flank pain  Abdominal pain:     Location:  Epigastric    Quality:  Aching and cramping    Severity:  Moderate    Onset quality:  Gradual    Duration:  1 day    Timing:  Intermittent    Progression:  Waxing and waning    Chronicity:  New  Risk factors: high cholesterol        Prior to Admission Medications   Prescriptions Last Dose Informant Patient Reported? Taking?   Vitamin D, Cholecalciferol, 50 MCG ( UT) CAPS  Self Yes Yes   Sig: Take 2,000 mg by mouth in the morning   atorvastatin (LIPITOR) 40 mg tablet   No Yes   Sig: Take 1 tablet (40 mg total) by mouth daily   fluticasone (FLONASE) 50 mcg/act nasal spray   No Yes   Si spray into each nostril daily   tadalafil (CIALIS) 20 MG tablet   No Yes   Sig: Take one tablet one hour before sex   tadalafil (CIALIS) 5 MG tablet   No Yes   Sig: Take 1 tablet (5 mg total) by mouth in the morning      Facility-Administered Medications: None       Past Medical History:   Diagnosis Date    Elevated cholesterol     Fingertip  amputation     right 5th    High cholesterol     Keloid of skin 07/29/2019    Pre-diabetes     S/P cholecystectomy 01/14/2022       Past Surgical History:   Procedure Laterality Date    CHOLECYSTECTOMY LAPAROSCOPIC N/A 1/11/2022    Procedure: CHOLECYSTECTOMY LAPAROSCOPIC;  Surgeon: Bernard Singh MD;  Location: [facility] OR;  Service: General    COLONOSCOPY         Family History   Problem Relation Age of Onset    Hypertension Mother     No Known Problems Father     No Known Problems Sister     No Known Problems Brother     No Known Problems Maternal Aunt     No Known Problems Maternal Uncle     No Known Problems Paternal Aunt     No Known Problems Paternal Uncle     No Known Problems Maternal Grandmother     No Known Problems Maternal Grandfather     No Known Problems Paternal Grandmother     No Known Problems Paternal Grandfather     ADD / ADHD Neg Hx     Anesthesia problems Neg Hx     Cancer Neg Hx     Clotting disorder Neg Hx     Collagen disease Neg Hx     Diabetes Neg Hx     Dislocations Neg Hx     Learning disabilities Neg Hx     Neurological problems Neg Hx     Osteoporosis Neg Hx     Rheumatologic disease Neg Hx     Scoliosis Neg Hx     Vascular Disease Neg Hx      I have reviewed and agree with the history as documented.    E-Cigarette/Vaping    E-Cigarette Use Never User      E-Cigarette/Vaping Substances    Nicotine No     THC No     CBD No     Flavoring No     Other No     Unknown No      Social History     Tobacco Use    Smoking status: Never    Smokeless tobacco: Never   Vaping Use    Vaping status: Never Used   Substance Use Topics    Alcohol use: Yes     Comment: SOCIALLY    Drug use: No       Review of Systems   Constitutional:  Negative for chills, diaphoresis and fever.   HENT:  Negative for ear pain, facial swelling, sore throat, trouble swallowing and voice change.    Eyes:  Negative for pain and visual disturbance.   Respiratory:  Negative for cough, chest tightness and shortness of breath.     Cardiovascular:  Positive for chest pain. Negative for palpitations and leg swelling.   Gastrointestinal:  Positive for abdominal pain. Negative for constipation, diarrhea, nausea and vomiting.   Endocrine: Negative.    Genitourinary:  Positive for flank pain. Negative for difficulty urinating, dysuria and frequency.   Musculoskeletal:  Negative for back pain, neck pain and neck stiffness.   Skin:  Negative for color change, pallor and rash.   Allergic/Immunologic: Negative for environmental allergies and immunocompromised state.   Neurological:  Negative for dizziness, speech difficulty, weakness and headaches.   Hematological:  Negative for adenopathy. Does not bruise/bleed easily.   Psychiatric/Behavioral: Negative.  Negative for agitation and behavioral problems.    All other systems reviewed and are negative.      Physical Exam  Physical Exam  Vitals and nursing note reviewed.   Constitutional:       General: He is not in acute distress.     Appearance: He is well-developed.   HENT:      Head: Normocephalic and atraumatic.   Eyes:      Extraocular Movements: Extraocular movements intact.   Cardiovascular:      Rate and Rhythm: Normal rate and regular rhythm.      Heart sounds: Normal heart sounds.   Pulmonary:      Effort: Pulmonary effort is normal.      Breath sounds: Normal breath sounds.   Abdominal:      Palpations: Abdomen is soft.      Tenderness: There is no abdominal tenderness. There is no guarding or rebound.   Musculoskeletal:         General: Normal range of motion.      Cervical back: Normal range of motion and neck supple.   Skin:     General: Skin is warm and dry.   Neurological:      General: No focal deficit present.      Mental Status: He is alert and oriented to person, place, and time.   Psychiatric:         Mood and Affect: Mood normal.         Behavior: Behavior normal.         Vital Signs  ED Triage Vitals [12/19/23 1013]   Temperature Pulse Respirations Blood Pressure SpO2   98 °F  (36.7 °C) 82 18 122/82 93 %      Temp Source Heart Rate Source Patient Position - Orthostatic VS BP Location FiO2 (%)   Oral Monitor Sitting Right arm --      Pain Score       10 - Worst Possible Pain           Vitals:    12/19/23 1227 12/19/23 1228 12/19/23 1428 12/19/23 1430   BP: 126/86 121/79 111/64 111/64   Pulse: 72 66 64 66   Patient Position - Orthostatic VS: Lying Lying Sitting          Visual Acuity      ED Medications  Medications   pantoprazole (PROTONIX) injection 40 mg (40 mg Intravenous Given 12/19/23 1220)   aluminum-magnesium hydroxide-simethicone (MAALOX) oral suspension 30 mL (30 mL Oral Given 12/19/23 1221)   acetaminophen (TYLENOL) tablet 650 mg (650 mg Oral Given 12/19/23 1221)   iohexol (OMNIPAQUE) 350 MG/ML injection (SINGLE-DOSE) 100 mL (100 mL Intravenous Given 12/19/23 1237)       Diagnostic Studies  Results Reviewed       Procedure Component Value Units Date/Time    Lipase [[id number]]  (Normal) Collected: 12/19/23 1228    Lab Status: Final result Specimen: Blood from Arm, Left Updated: 12/19/23 1302     Lipase 21 u/L     HS Troponin I 2hr [[id number]]  (Normal) Collected: 12/19/23 1228    Lab Status: Final result Specimen: Blood from Arm, Left Updated: 12/19/23 1257     hs TnI 2hr 2 ng/L      Delta 2hr hsTnI 0 ng/L     Comprehensive metabolic panel [[id number]]  (Abnormal) Collected: 12/19/23 1036    Lab Status: Final result Specimen: Blood from Arm, Left Updated: 12/19/23 1119     Sodium 134 mmol/L      Potassium 3.9 mmol/L      Chloride 101 mmol/L      CO2 25 mmol/L      ANION GAP 8 mmol/L      BUN 11 mg/dL      Creatinine 0.89 mg/dL      Glucose 108 mg/dL      Calcium 9.5 mg/dL      AST 17 U/L      ALT 29 U/L      Alkaline Phosphatase 90 U/L      Total Protein 7.6 g/dL      Albumin 4.2 g/dL      Total Bilirubin 0.83 mg/dL      eGFR 98 ml/min/1.73sq m     Narrative:      National Kidney Disease Foundation guidelines for Chronic Kidney Disease (CKD):     Stage 1 with normal or high GFR  (GFR > 90 mL/min/1.73 square meters)    Stage 2 Mild CKD (GFR = 60-89 mL/min/1.73 square meters)    Stage 3A Moderate CKD (GFR = 45-59 mL/min/1.73 square meters)    Stage 3B Moderate CKD (GFR = 30-44 mL/min/1.73 square meters)    Stage 4 Severe CKD (GFR = 15-29 mL/min/1.73 square meters)    Stage 5 End Stage CKD (GFR <15 mL/min/1.73 square meters)  Note: GFR calculation is accurate only with a steady state creatinine    HS Troponin 0hr (reflex protocol) [[id number]]  (Normal) Collected: 12/19/23 1036    Lab Status: Final result Specimen: Blood from Arm, Left Updated: 12/19/23 1106     hs TnI 0hr 2 ng/L     CBC and differential [[id number]] Collected: 12/19/23 1036    Lab Status: Final result Specimen: Blood from Arm, Left Updated: 12/19/23 1047     WBC 9.25 Thousand/uL      RBC 5.32 Million/uL      Hemoglobin 15.6 g/dL      Hematocrit 48.0 %      MCV 90 fL      MCH 29.3 pg      MCHC 32.5 g/dL      RDW 12.8 %      MPV 9.3 fL      Platelets 326 Thousands/uL      nRBC 0 /100 WBCs      Neutrophils Relative 57 %      Immat GRANS % 0 %      Lymphocytes Relative 33 %      Monocytes Relative 7 %      Eosinophils Relative 3 %      Basophils Relative 0 %      Neutrophils Absolute 5.19 Thousands/µL      Immature Grans Absolute 0.03 Thousand/uL      Lymphocytes Absolute 3.08 Thousands/µL      Monocytes Absolute 0.62 Thousand/µL      Eosinophils Absolute 0.29 Thousand/µL      Basophils Absolute 0.04 Thousands/µL                    CTA dissection protocol chest abdomen pelvis w wo contrast   Final Result by Dev Ingram MD (12/19 8768)      No aortic dissection or aneurysm.      Bilateral nonobstructing punctate intrarenal calculi measuring up to 2 mm. No hydronephrosis.                  Workstation performed: [id number]         XR chest 1 view portable   Final Result by Guadalupe Land MD (12/19 8027)      No acute cardiopulmonary disease.               Workstation performed: [id number]                    Procedures  ECG  12 Lead Documentation Only    Date/Time: 12/19/2023 12:18 PM    Performed by: Boyd Porras MD  Authorized by: Boyd Porras MD    Indications / Diagnosis:  Chest pain  ECG reviewed by me, the ED Provider: yes    Patient location:  ED  Interpretation:     Interpretation: normal    Rate:     ECG rate assessment: normal    Rhythm:     Rhythm: sinus rhythm    Ectopy:     Ectopy: none    QRS:     QRS axis:  Normal  Conduction:     Conduction: normal    ST segments:     ST segments:  Normal  T waves:     T waves: normal             ED Course  ED Course as of 12/20/23 0009   Tue Dec 19, 2023   1155 WBC: 9.25   1156 Hemoglobin: 15.6   1156 Platelet Count: 326   1156 Sodium(!): 134   1156 Potassium: 3.9   1156 BUN: 11   1156 Creatinine: 0.89   1156 Glucose, Random: 108   1156 hs TnI 0hr: 2  Labs reviewed, no significant acute abnormality noted.   1156 XR chest 1 view portable  Chest x-ray independently reviewed, no significant acute abnormality noted.   1429 hs TnI 2hr: 2   1430 Delta 2hr hsTnI: 0  Delta Trop 0.   1534 CTA dissection protocol chest abdomen pelvis w wo contrast  IMPRESSION:     No aortic dissection or aneurysm.     Bilateral nonobstructing punctate intrarenal calculi measuring up to 2 mm. No hydronephrosis.     1534 Patient informed about workup results, no significant acute dramality, will advise Pepcid, follow-up with PCP and gastroenterology for possible GERD type symptoms.             HEART Risk Score      Flowsheet Row Most Recent Value   Heart Score Risk Calculator    History 1 Filed at: 12/19/2023 1430   ECG 0 Filed at: 12/19/2023 1430   Age 1 Filed at: 12/19/2023 1430   Risk Factors 1 Filed at: 12/19/2023 1430   Troponin 0 Filed at: 12/19/2023 1430   HEART Score 3 Filed at: 12/19/2023 1430                          SBIRT 20yo+      Flowsheet Row Most Recent Value   Initial Alcohol Screen: US AUDIT-C     1. How often do you have a drink containing alcohol? 0 Filed at: 12/19/2023 1550   2. How many  drinks containing alcohol do you have on a typical day you are drinking?  0 Filed at: 12/19/2023 1550   3a. Male UNDER 65: How often do you have five or more drinks on one occasion? 0 Filed at: 12/19/2023 1550   Audit-C Score 0 Filed at: 12/19/2023 1550   LEIDY: How many times in the past year have you...    Used an illegal drug or used a prescription medication for non-medical reasons? Never Filed at: 12/19/2023 1550                      Medical Decision Making  Patient is a 51-year-old male, history of hypercholesterolemia, comes in with complaints of chest pain, describing central and bilateral chest, lateral flank, down his groins, radiating to back, going on since Friday, pain came on gradually, patient does not remember any inciting factors, no fever or cough, no dyspnea, patient does report epigastric discomfort and feels that he may be having spasm in his esophagus as he was googling his symptoms.  On exam, patient is conscious, alert, vital signs stable, no acute distress, lungs are clear, heart sounds regular, pulses equal bilaterally, abdomen soft, mild tenderness in epigastrium.  Differential diagnosis: Nonspecific chest pain, flank pain, back pain, aortic dissection, ACS, gastritis, pancreatitis, labs, EKG, get CT dissection study will check labs, EKG, get CT dissection study, give Protonix.    Problems Addressed:  Back pain: acute illness or injury  Chest pain: acute illness or injury    Amount and/or Complexity of Data Reviewed  Labs: ordered. Decision-making details documented in ED Course.  Radiology: ordered and independent interpretation performed. Decision-making details documented in ED Course.  ECG/medicine tests: ordered and independent interpretation performed. Decision-making details documented in ED Course.    Risk  OTC drugs.  Prescription drug management.             Disposition  Final diagnoses:   Chest pain   GERD (gastroesophageal reflux disease)   Back pain     Time reflects when  diagnosis was documented in both MDM as applicable and the Disposition within this note       Time User Action Codes Description Comment    12/19/2023 12:34 PM Alam, Boyd Add [M54.9] Back pain     12/19/2023 12:34 PM Alam, Boyd Add [M54.30] Sciatica     12/19/2023 12:37 PM Alam, Boyd Remove [M54.30] Sciatica     12/19/2023 12:37 PM Alam, Boyd Remove [M54.9] Back pain     12/19/2023  1:05 PM Alam, Boyd Add [R07.9] Chest pain     12/19/2023  1:05 PM Alam, Boyd Add [M54.9] Back pain     12/19/2023  3:35 PM Alam, Boyd Add [K21.9] GERD (gastroesophageal reflux disease)     12/19/2023  3:36 PM Alam, Boyd Remove [M54.9] Back pain     12/19/2023  3:36 PM Alam, Boyd Add [M54.9] Back pain           ED Disposition       ED Disposition   Discharge    Condition   Stable    Date/Time   Tue Dec 19, 2023 1234    Comment   Sony Mayankpantera discharge to home/self care.                   Follow-up Information       Follow up With Specialties Details Why Contact Info Additional Information    Lizeth Justin PA-C Physician Assistant, Family Medicine Schedule an appointment as soon as possible for a visit   [address] [place] [id number] [phone]       [facility] [place] [facility]  If symptoms worsen, As needed [address]  [address]  [facility] [address] [phone] [facility] [place], [facility] [address], [place], [place], [address] [phone]            Discharge Medication List as of 12/19/2023  3:37 PM        START taking these medications    Details   famotidine (PEPCID) 20 mg tablet Take 1 tablet (20 mg total) by mouth 2 (two) times a day for 10 days, Starting Tue 12/19/2023, Until Fri 12/29/2023, Normal           CONTINUE these medications which have NOT CHANGED    Details   atorvastatin (LIPITOR) 40 mg tablet Take 1 tablet (40 mg total) by mouth daily, Starting Fri 12/8/2023,  Normal      fluticasone (FLONASE) 50 mcg/act nasal spray 1 spray into each nostril daily, Starting Fri 6/2/2023, Normal      !! tadalafil (CIALIS) 20 MG tablet Take one tablet one hour before sex, Normal      !! tadalafil (CIALIS) 5 MG tablet Take 1 tablet (5 mg total) by mouth in the morning, Starting Fri 10/20/2023, Normal      Vitamin D, Cholecalciferol, 50 MCG (2000 UT) CAPS Take 2,000 mg by mouth in the morning, Historical Med       !! - Potential duplicate medications found. Please discuss with provider.          No discharge procedures on file.    PDMP Review         Value Time User    PDMP Reviewed  Yes 1/17/2022 10:02 AM YANELI Flores            ED Provider  Electronically Signed by             Boyd Porras MD  12/20/23 0009

## 2023-12-28 ENCOUNTER — OFFICE VISIT (OUTPATIENT)
Dept: FAMILY MEDICINE CLINIC | Facility: CLINIC | Age: 53
End: 2023-12-28
Payer: COMMERCIAL

## 2023-12-28 VITALS
SYSTOLIC BLOOD PRESSURE: 127 MMHG | TEMPERATURE: 96.9 F | WEIGHT: 204 LBS | RESPIRATION RATE: 17 BRPM | OXYGEN SATURATION: 97 % | HEART RATE: 70 BPM | BODY MASS INDEX: 34.83 KG/M2 | HEIGHT: 64 IN | DIASTOLIC BLOOD PRESSURE: 78 MMHG

## 2023-12-28 DIAGNOSIS — R82.998 DARK URINE: ICD-10-CM

## 2023-12-28 DIAGNOSIS — K92.1 BLOODY STOOL: ICD-10-CM

## 2023-12-28 DIAGNOSIS — R10.84 GENERALIZED ABDOMINAL PAIN: Primary | ICD-10-CM

## 2023-12-28 DIAGNOSIS — N20.0 KIDNEY STONE: ICD-10-CM

## 2023-12-28 LAB
BILIRUB UR QL STRIP: NEGATIVE
CLARITY UR: CLEAR
COLOR UR: NORMAL
GLUCOSE UR STRIP-MCNC: NEGATIVE MG/DL
HGB UR QL STRIP.AUTO: NEGATIVE
KETONES UR STRIP-MCNC: NEGATIVE MG/DL
LEUKOCYTE ESTERASE UR QL STRIP: NEGATIVE
NITRITE UR QL STRIP: NEGATIVE
PH UR STRIP.AUTO: 6 [PH]
PROT UR STRIP-MCNC: NEGATIVE MG/DL
SP GR UR STRIP.AUTO: 1.01 (ref 1–1.03)
UROBILINOGEN UR STRIP-ACNC: <2 MG/DL

## 2023-12-28 PROCEDURE — 81003 URINALYSIS AUTO W/O SCOPE: CPT | Performed by: PHYSICIAN ASSISTANT

## 2023-12-28 PROCEDURE — 99214 OFFICE O/P EST MOD 30 MIN: CPT | Performed by: PHYSICIAN ASSISTANT

## 2023-12-28 RX ORDER — DICYCLOMINE HCL 20 MG
20 TABLET ORAL EVERY 6 HOURS PRN
Qty: 30 TABLET | Refills: 0 | Status: SHIPPED | OUTPATIENT
Start: 2023-12-28

## 2023-12-28 RX ORDER — OMEPRAZOLE 40 MG/1
40 CAPSULE, DELAYED RELEASE ORAL DAILY
Qty: 30 CAPSULE | Refills: 0 | Status: SHIPPED | OUTPATIENT
Start: 2023-12-28

## 2023-12-28 NOTE — PROGRESS NOTES
Name: Sony Martínez      : 1970      MRN: [id number]  Encounter Provider: Lizeth Justin PA-C  Encounter Date: 2023   Encounter department: [facility]    Assessment & Plan     1. Generalized abdominal pain  Assessment & Plan:  Suspect gastroenteritis with persistent irritable bowel.  Recommend probiotics.  Reviewed ED visit from 2023, persistent pain since Dylan, travel to the ER.  Had episodes of diarrhea, no fever.  Sometimes left upper quadrant radiating to right upper quadrant.  With acid reflux.  No longer with diarrhea.  Start dicyclomine 20 mg and check H. pylori stool test.  No acute concerns with CTA chest/abdomen/pelvis done in ED.  No cardiac symptoms.    Orders:  -     H. pylori antigen, stool; Future  -     Ambulatory Referral to Gastroenterology; Future  -     dicyclomine (BENTYL) 20 mg tablet; Take 1 tablet (20 mg total) by mouth every 6 (six) hours as needed (indigestion)  -     omeprazole (PriLOSEC) 40 MG capsule; Take 1 capsule (40 mg total) by mouth daily    2. Bloody stool  Comments:  X 1-2 episodes with wiping red blood, reviewed last colonoscopy in  normal, referred to GI today  Orders:  -     H. pylori antigen, stool; Future  -     Ambulatory Referral to Gastroenterology; Future    3. Dark urine  Comments:  Check UA  Orders:  -     UA w Reflex to Microscopic w Reflex to Culture    4. Kidney stone  Assessment & Plan:  Bilateral nonobstructing punctate intrarenal calculi measuring up to 2 mm incidental finding per CTA chest/abd/pelvis done in ED on 23.  Patient reports passed a stone many years ago.  Recommend increase fluid intake.  Check UA.            Josephine Cardoza is a 53 y.o. male with a h/o HLD who presents with persistent abdominal pain for ED follow-up for same on 2023.  Pain started after he  went to [place] for Thanksgiving. Diarrhea resolved. Last week abdominal pain got worse radiated to his back and  "up to his chest with acid reflux and he went to the ER.  They gave him famotidine which did not improve symptoms.  Concerned because he had bone stuck in his throat and had a friend who had infection.  Has not had any fevers or chills.  Some dark urination.  Blood with wiping 1 or 2 times since had diarrhea episodes.  No blood in the past 2 days.  Normal bowel movements.  No vomiting.  Not taking any other than famotidine.          Review of Systems   Constitutional:  Negative for chills, fatigue, fever and unexpected weight change.   Respiratory:  Negative for shortness of breath.    Cardiovascular:  Negative for chest pain.   Gastrointestinal:  Positive for abdominal pain, blood in stool and diarrhea (Resolved). Negative for abdominal distention, anal bleeding, constipation, nausea, rectal pain and vomiting.       Current Outpatient Medications on File Prior to Visit   Medication Sig   • atorvastatin (LIPITOR) 40 mg tablet Take 1 tablet (40 mg total) by mouth daily   • famotidine (PEPCID) 20 mg tablet Take 1 tablet (20 mg total) by mouth 2 (two) times a day for 10 days   • fluticasone (FLONASE) 50 mcg/act nasal spray 1 spray into each nostril daily   • tadalafil (CIALIS) 20 MG tablet Take one tablet one hour before sex   • tadalafil (CIALIS) 5 MG tablet Take 1 tablet (5 mg total) by mouth in the morning   • Vitamin D, Cholecalciferol, 50 MCG (2000 UT) CAPS Take 2,000 mg by mouth in the morning       Objective     /78 (BP Location: Left arm, Patient Position: Sitting, Cuff Size: Standard)   Pulse 70   Temp (!) 96.9 °F (36.1 °C) (Tympanic)   Resp 17   Ht 5' 4\" (1.626 m)   Wt 92.5 kg (204 lb)   SpO2 97%   BMI 35.02 kg/m²     Physical Exam  Vitals and nursing note reviewed.   Constitutional:       Appearance: Normal appearance. He is obese.   HENT:      Head: Normocephalic and atraumatic.   Cardiovascular:      Rate and Rhythm: Normal rate and regular rhythm.      Pulses: Normal pulses.      Heart sounds: " Normal heart sounds.   Pulmonary:      Effort: Pulmonary effort is normal.      Breath sounds: Normal breath sounds.   Abdominal:      Palpations: Abdomen is soft.      Tenderness: There is generalized abdominal tenderness and tenderness in the right upper quadrant, epigastric area and left upper quadrant. There is no right CVA tenderness or left CVA tenderness.   Neurological:      Mental Status: He is alert and oriented to person, place, and time. Mental status is at baseline.       Lizeth Justin PA-C

## 2023-12-28 NOTE — ASSESSMENT & PLAN NOTE
Suspect gastroenteritis with persistent irritable bowel.  Recommend probiotics.  Reviewed ED visit from 12/19/2023, persistent pain since Thanksgiving, travel to the ER.  Had episodes of diarrhea, no fever.  Sometimes left upper quadrant radiating to right upper quadrant.  With acid reflux.  No longer with diarrhea.  Start dicyclomine 20 mg and check H. pylori stool test.  No acute concerns with CTA chest/abdomen/pelvis done in ED.  No cardiac symptoms.

## 2023-12-28 NOTE — ASSESSMENT & PLAN NOTE
Bilateral nonobstructing punctate intrarenal calculi measuring up to 2 mm incidental finding per CTA chest/abd/pelvis done in ED on 12/19/23.  Patient reports passed a stone many years ago.  Recommend increase fluid intake.  Check UA.

## 2023-12-29 ENCOUNTER — APPOINTMENT (OUTPATIENT)
Dept: LAB | Facility: HOSPITAL | Age: 53
End: 2023-12-29
Payer: COMMERCIAL

## 2023-12-29 DIAGNOSIS — K92.1 BLOODY STOOL: ICD-10-CM

## 2023-12-29 DIAGNOSIS — R10.84 GENERALIZED ABDOMINAL PAIN: ICD-10-CM

## 2023-12-29 PROCEDURE — 87338 HPYLORI STOOL AG IA: CPT

## 2023-12-31 LAB — H PYLORI AG STL QL IA: POSITIVE

## 2024-02-19 DIAGNOSIS — N52.9 ERECTILE DYSFUNCTION, UNSPECIFIED ERECTILE DYSFUNCTION TYPE: ICD-10-CM

## 2024-02-19 RX ORDER — TADALAFIL 20 MG/1
TABLET ORAL
Qty: 30 TABLET | Refills: 0 | Status: SHIPPED | OUTPATIENT
Start: 2024-02-19

## 2024-02-19 RX ORDER — TADALAFIL 5 MG/1
5 TABLET ORAL DAILY
Qty: 90 TABLET | Refills: 0 | Status: SHIPPED | OUTPATIENT
Start: 2024-02-19

## 2024-02-20 ENCOUNTER — APPOINTMENT (EMERGENCY)
Dept: CT IMAGING | Facility: HOSPITAL | Age: 54
End: 2024-02-20
Payer: COMMERCIAL

## 2024-02-20 ENCOUNTER — HOSPITAL ENCOUNTER (EMERGENCY)
Facility: HOSPITAL | Age: 54
Discharge: HOME | End: 2024-02-20
Attending: EMERGENCY MEDICINE
Payer: COMMERCIAL

## 2024-02-20 VITALS
SYSTOLIC BLOOD PRESSURE: 110 MMHG | RESPIRATION RATE: 16 BRPM | OXYGEN SATURATION: 98 % | TEMPERATURE: 97.9 F | DIASTOLIC BLOOD PRESSURE: 64 MMHG | HEART RATE: 73 BPM

## 2024-02-20 DIAGNOSIS — E87.6 HYPOKALEMIA: ICD-10-CM

## 2024-02-20 DIAGNOSIS — R04.0 EPISTAXIS: ICD-10-CM
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
## 2024-02-20 LAB
2HR DELTA HS TROPONIN: 8 NG/L
4HR DELTA HS TROPONIN: 2 NG/L
ANION GAP SERPL CALCULATED.3IONS-SCNC: 6 MMOL/L
ATRIAL RATE: 76 BPM
ATRIAL RATE: 83 BPM
ATRIAL RATE: 89 BPM
BASOPHILS # BLD AUTO: 0.03 THOUSANDS/ÂΜL (ref 0–0.1)
BASOPHILS NFR BLD AUTO: 0 % (ref 0–1)
BUN SERPL-MCNC: 13 MG/DL (ref 5–25)
CALCIUM SERPL-MCNC: 8.3 MG/DL (ref 8.4–10.2)
CARDIAC TROPONIN I PNL SERPL HS: 14 NG/L
CARDIAC TROPONIN I PNL SERPL HS: 16 NG/L
CARDIAC TROPONIN I PNL SERPL HS: 22 NG/L
CHLORIDE SERPL-SCNC: 103 MMOL/L (ref 96–108)
CO2 SERPL-SCNC: 27 MMOL/L (ref 21–32)
CREAT SERPL-MCNC: 1.17 MG/DL (ref 0.6–1.3)
EOSINOPHIL # BLD AUTO: 0.08 THOUSAND/ÂΜL (ref 0–0.61)
EOSINOPHIL NFR BLD AUTO: 1 % (ref 0–6)
ERYTHROCYTE [DISTWIDTH] IN BLOOD BY AUTOMATED COUNT: 13 % (ref 11.6–15.1)
GFR SERPL CREATININE-BSD FRML MDRD: 71 ML/MIN/1.73SQ M
GLUCOSE SERPL-MCNC: 137 MG/DL (ref 65–140)
HCT VFR BLD AUTO: 45.9 % (ref 36.5–49.3)
HGB BLD-MCNC: 15.2 G/DL (ref 12–17)
IMM GRANULOCYTES # BLD AUTO: 0.04 THOUSAND/UL (ref 0–0.2)
IMM GRANULOCYTES NFR BLD AUTO: 0 % (ref 0–2)
LYMPHOCYTES # BLD AUTO: 1.69 THOUSANDS/ÂΜL (ref 0.6–4.47)
LYMPHOCYTES NFR BLD AUTO: 13 % (ref 14–44)
MCH RBC QN AUTO: 29.3 PG (ref 26.8–34.3)
MCHC RBC AUTO-ENTMCNC: 33.1 G/DL (ref 31.4–37.4)
MCV RBC AUTO: 88 FL (ref 82–98)
MONOCYTES # BLD AUTO: 0.5 THOUSAND/ÂΜL (ref 0.17–1.22)
MONOCYTES NFR BLD AUTO: 4 % (ref 4–12)
NEUTROPHILS # BLD AUTO: 10.68 THOUSANDS/ÂΜL (ref 1.85–7.62)
NEUTS SEG NFR BLD AUTO: 82 % (ref 43–75)
NRBC BLD AUTO-RTO: 0 /100 WBCS
P AXIS: 59 DEGREES
P AXIS: 64 DEGREES
P AXIS: 68 DEGREES
PLATELET # BLD AUTO: 278 THOUSANDS/UL (ref 149–390)
PMV BLD AUTO: 8.8 FL (ref 8.9–12.7)
POTASSIUM SERPL-SCNC: 3.3 MMOL/L (ref 3.5–5.3)
PR INTERVAL: 144 MS
PR INTERVAL: 148 MS
PR INTERVAL: 150 MS
QRS AXIS: 19 DEGREES
QRS AXIS: 21 DEGREES
QRS AXIS: 25 DEGREES
QRSD INTERVAL: 74 MS
QRSD INTERVAL: 76 MS
QRSD INTERVAL: 82 MS
QT INTERVAL: 344 MS
QT INTERVAL: 352 MS
QT INTERVAL: 378 MS
QTC INTERVAL: 404 MS
QTC INTERVAL: 425 MS
QTC INTERVAL: 428 MS
RBC # BLD AUTO: 5.19 MILLION/UL (ref 3.88–5.62)
SODIUM SERPL-SCNC: 136 MMOL/L (ref 135–147)
T WAVE AXIS: 38 DEGREES
T WAVE AXIS: 57 DEGREES
T WAVE AXIS: 61 DEGREES
VENTRICULAR RATE: 76 BPM
VENTRICULAR RATE: 83 BPM
VENTRICULAR RATE: 89 BPM
WBC # BLD AUTO: 13.02 THOUSAND/UL (ref 4.31–10.16)

## 2024-02-20 PROCEDURE — 85025 COMPLETE CBC W/AUTO DIFF WBC: CPT | Performed by: PHYSICIAN ASSISTANT

## 2024-02-20 PROCEDURE — 99285 EMERGENCY DEPT VISIT HI MDM: CPT | Performed by: PHYSICIAN ASSISTANT

## 2024-02-20 PROCEDURE — 70450 CT HEAD/BRAIN W/O DYE: CPT

## 2024-02-20 PROCEDURE — 84484 ASSAY OF TROPONIN QUANT: CPT | Performed by: PHYSICIAN ASSISTANT

## 2024-02-20 PROCEDURE — 36415 COLL VENOUS BLD VENIPUNCTURE: CPT | Performed by: PHYSICIAN ASSISTANT

## 2024-02-20 PROCEDURE — 80048 BASIC METABOLIC PNL TOTAL CA: CPT | Performed by: PHYSICIAN ASSISTANT

## 2024-02-20 PROCEDURE — G1004 CDSM NDSC: HCPCS

## 2024-02-20 PROCEDURE — 93010 ELECTROCARDIOGRAM REPORT: CPT

## 2024-02-20 PROCEDURE — 93005 ELECTROCARDIOGRAM TRACING: CPT

## 2024-02-20 PROCEDURE — 93010 ELECTROCARDIOGRAM REPORT: CPT | Performed by: INTERNAL MEDICINE

## 2024-02-20 PROCEDURE — 72125 CT NECK SPINE W/O DYE: CPT

## 2024-02-20 PROCEDURE — 96360 HYDRATION IV INFUSION INIT: CPT

## 2024-02-20 PROCEDURE — 99284 EMERGENCY DEPT VISIT MOD MDM: CPT

## 2024-02-20 RX ORDER — POTASSIUM CHLORIDE 20 MEQ/1
40 TABLET, EXTENDED RELEASE ORAL ONCE
Status: COMPLETED | OUTPATIENT
Start: 2024-02-20 | End: 2024-02-20

## 2024-02-20 RX ADMIN — POTASSIUM CHLORIDE 40 MEQ: 1500 TABLET, EXTENDED RELEASE ORAL at 10:50

## 2024-02-20 RX ADMIN — SODIUM CHLORIDE 500 ML: 0.9 INJECTION, SOLUTION INTRAVENOUS at 09:32

## 2024-02-20 NOTE — ED PROVIDER NOTES
History  Chief Complaint   Patient presents with    Fall     Pt arrived via ems. Per EMS, pt had a fall this morning and hit his head resulting in a nose bleed. Pt reports that he took a muscle relaxer ~1 hour prior to the fall. Pt was taken to [facility] and was take to ED shortly after by ambulance. Pt a&ox4 upon arrival. C collar applied. (+) head strike, (-) thinners     Syncope     Sony is a 54 yo M, history of HLD, presenting via EMS after fall/head strike earlier today. He reports taking a medication from [facility] [place] which appears to be NSAID and vitamins for muscle pain he felt. This has improved, but reports about 30-45 min after taking this he felt lightheaded/pre-syncopal. He then fell, striking his nose on the wall while falling and believes he briefly passed out. Reports awakening on the ground. He had a nosebleed for a few minutes but this has since resolved. He is largely asymptomatic at this time. No associated chest pain/pressure, palpitations. No significant headache or neck pain reported. No thinners/aspirin use.       History provided by:  Patient   used: No        Prior to Admission Medications   Prescriptions Last Dose Informant Patient Reported? Taking?   Vitamin D, Cholecalciferol, 50 MCG ( UT) CAPS  Self Yes No   Sig: Take 2,000 mg by mouth in the morning   atorvastatin (LIPITOR) 40 mg tablet   No No   Sig: Take 1 tablet (40 mg total) by mouth daily   dicyclomine (BENTYL) 20 mg tablet   No No   Sig: Take 1 tablet (20 mg total) by mouth every 6 (six) hours as needed (indigestion)   famotidine (PEPCID) 20 mg tablet   No No   Sig: Take 1 tablet (20 mg total) by mouth 2 (two) times a day for 10 days   fluticasone (FLONASE) 50 mcg/act nasal spray   No No   Si spray into each nostril daily   omeprazole (PriLOSEC) 40 MG capsule   No No   Sig: Take 1 capsule (40 mg total) by mouth daily   tadalafil (CIALIS) 20 MG tablet   No No   Sig: Take one tablet one  hour before sex   tadalafil (CIALIS) 5 MG tablet   No No   Sig: Take 1 tablet (5 mg total) by mouth in the morning      Facility-Administered Medications: None       Past Medical History:   Diagnosis Date    Elevated cholesterol     Fingertip amputation     right 5th    High cholesterol     Keloid of skin 07/29/2019    Pre-diabetes     S/P cholecystectomy 01/14/2022       Past Surgical History:   Procedure Laterality Date    CHOLECYSTECTOMY LAPAROSCOPIC N/A 1/11/2022    Procedure: CHOLECYSTECTOMY LAPAROSCOPIC;  Surgeon: Bernard Singh MD;  Location: [facility] OR;  Service: General    COLONOSCOPY         Family History   Problem Relation Age of Onset    Hypertension Mother     No Known Problems Father     No Known Problems Sister     No Known Problems Brother     No Known Problems Maternal Aunt     No Known Problems Maternal Uncle     No Known Problems Paternal Aunt     No Known Problems Paternal Uncle     No Known Problems Maternal Grandmother     No Known Problems Maternal Grandfather     No Known Problems Paternal Grandmother     No Known Problems Paternal Grandfather     ADD / ADHD Neg Hx     Anesthesia problems Neg Hx     Cancer Neg Hx     Clotting disorder Neg Hx     Collagen disease Neg Hx     Diabetes Neg Hx     Dislocations Neg Hx     Learning disabilities Neg Hx     Neurological problems Neg Hx     Osteoporosis Neg Hx     Rheumatologic disease Neg Hx     Scoliosis Neg Hx     Vascular Disease Neg Hx      I have reviewed and agree with the history as documented.    E-Cigarette/Vaping    E-Cigarette Use Never User      E-Cigarette/Vaping Substances    Nicotine No     THC No     CBD No     Flavoring No     Other No     Unknown No      Social History     Tobacco Use    Smoking status: Never    Smokeless tobacco: Never   Vaping Use    Vaping status: Never Used   Substance Use Topics    Alcohol use: Yes     Comment: SOCIALLY    Drug use: No       Review of Systems   Constitutional:  Negative for chills and fever.    HENT:  Positive for nosebleeds. Negative for congestion, rhinorrhea and sore throat.    Eyes:  Negative for pain and visual disturbance.   Respiratory:  Negative for cough, shortness of breath and wheezing.    Cardiovascular:  Positive for syncope. Negative for chest pain and palpitations.   Gastrointestinal:  Negative for abdominal pain, diarrhea, nausea and vomiting.   Genitourinary:  Negative for dysuria, frequency and urgency.   Musculoskeletal:  Negative for back pain, neck pain and neck stiffness.   Skin:  Negative for rash and wound.   Neurological:  Positive for syncope. Negative for dizziness, weakness, light-headedness and numbness.       Physical Exam  Physical Exam  Constitutional:       General: He is not in acute distress.     Appearance: He is well-developed. He is not diaphoretic.   HENT:      Head: Normocephalic and atraumatic. No raccoon eyes or Bueno's sign.      Right Ear: Tympanic membrane and external ear normal. No hemotympanum.      Left Ear: Tympanic membrane and external ear normal. No hemotympanum.      Nose: Nose normal. No nasal deformity.      Right Nostril: No septal hematoma.      Left Nostril: No septal hematoma.      Mouth/Throat:      Dentition: No dental tenderness.   Eyes:      Extraocular Movements:      Right eye: Normal extraocular motion.      Left eye: Normal extraocular motion.      Conjunctiva/sclera: Conjunctivae normal.      Pupils: Pupils are equal, round, and reactive to light.   Cardiovascular:      Rate and Rhythm: Normal rate and regular rhythm.      Comments: No LE edema  Pulmonary:      Effort: Pulmonary effort is normal. No accessory muscle usage or respiratory distress.      Breath sounds: No wheezing or rales.   Abdominal:      General: Abdomen is flat. There is no distension.      Tenderness: There is no abdominal tenderness.   Musculoskeletal:      Cervical back: Normal range of motion. No rigidity.      Comments: No midline C/T/L TTP. No bony  TTP/deformity or decreased ROM to b/l Ue's/LE's.    Skin:     General: Skin is warm and dry.      Capillary Refill: Capillary refill takes less than 2 seconds.      Findings: No erythema or rash.   Neurological:      Mental Status: He is alert and oriented to person, place, and time.      Motor: No abnormal muscle tone.      Coordination: Coordination normal.   Psychiatric:         Behavior: Behavior normal.         Thought Content: Thought content normal.         Judgment: Judgment normal.         Vital Signs  ED Triage Vitals   Temperature Pulse Respirations Blood Pressure SpO2   02/20/24 1213 02/20/24 0921 02/20/24 0921 02/20/24 0921 02/20/24 0921   97.9 °F (36.6 °C) 64 19 124/71 100 %      Temp Source Heart Rate Source Patient Position - Orthostatic VS BP Location FiO2 (%)   02/20/24 1213 02/20/24 0921 02/20/24 0921 02/20/24 0921 --   Oral Monitor Lying Right arm       Pain Score       --                  Vitals:    02/20/24 1200 02/20/24 1230 02/20/24 1300 02/20/24 1415   BP: 123/69 112/69 104/57 110/64   Pulse: 73 73 67 73   Patient Position - Orthostatic VS: Lying Lying Lying Sitting         Visual Acuity      ED Medications  Medications   sodium chloride 0.9 % bolus 500 mL (0 mL Intravenous Stopped 2/20/24 1032)   potassium chloride (Klor-Con M20) CR tablet 40 mEq (40 mEq Oral Given 2/20/24 1050)       Diagnostic Studies  Results Reviewed       Procedure Component Value Units Date/Time    HS Troponin I 4hr [[id number]]  (Normal) Collected: 02/20/24 1341    Lab Status: Final result Specimen: Blood from Arm, Right Updated: 02/20/24 1410     hs TnI 4hr 16 ng/L      Delta 4hr hsTnI 2 ng/L     HS Troponin I 2hr [[id number]]  (Normal) Collected: 02/20/24 1138    Lab Status: Final result Specimen: Blood from Arm, Right Updated: 02/20/24 1209     hs TnI 2hr 22 ng/L      Delta 2hr hsTnI 8 ng/L     HS Troponin 0hr (reflex protocol) [[id number]]  (Normal) Collected: 02/20/24 0933    Lab Status: Final result Specimen:  Blood from Arm, Right Updated: 02/20/24 1040     hs TnI 0hr 14 ng/L     Basic metabolic panel [[id number]]  (Abnormal) Collected: 02/20/24 0933    Lab Status: Final result Specimen: Blood from Arm, Right Updated: 02/20/24 0958     Sodium 136 mmol/L      Potassium 3.3 mmol/L      Chloride 103 mmol/L      CO2 27 mmol/L      ANION GAP 6 mmol/L      BUN 13 mg/dL      Creatinine 1.17 mg/dL      Glucose 137 mg/dL      Calcium 8.3 mg/dL      eGFR 71 ml/min/1.73sq m     Narrative:      National Kidney Disease Foundation guidelines for Chronic Kidney Disease (CKD):     Stage 1 with normal or high GFR (GFR > 90 mL/min/1.73 square meters)    Stage 2 Mild CKD (GFR = 60-89 mL/min/1.73 square meters)    Stage 3A Moderate CKD (GFR = 45-59 mL/min/1.73 square meters)    Stage 3B Moderate CKD (GFR = 30-44 mL/min/1.73 square meters)    Stage 4 Severe CKD (GFR = 15-29 mL/min/1.73 square meters)    Stage 5 End Stage CKD (GFR <15 mL/min/1.73 square meters)  Note: GFR calculation is accurate only with a steady state creatinine    CBC and differential [[id number]]  (Abnormal) Collected: 02/20/24 0933    Lab Status: Final result Specimen: Blood from Arm, Right Updated: 02/20/24 0939     WBC 13.02 Thousand/uL      RBC 5.19 Million/uL      Hemoglobin 15.2 g/dL      Hematocrit 45.9 %      MCV 88 fL      MCH 29.3 pg      MCHC 33.1 g/dL      RDW 13.0 %      MPV 8.8 fL      Platelets 278 Thousands/uL      nRBC 0 /100 WBCs      Neutrophils Relative 82 %      Immat GRANS % 0 %      Lymphocytes Relative 13 %      Monocytes Relative 4 %      Eosinophils Relative 1 %      Basophils Relative 0 %      Neutrophils Absolute 10.68 Thousands/µL      Immature Grans Absolute 0.04 Thousand/uL      Lymphocytes Absolute 1.69 Thousands/µL      Monocytes Absolute 0.50 Thousand/µL      Eosinophils Absolute 0.08 Thousand/µL      Basophils Absolute 0.03 Thousands/µL                    CT head without contrast   Final Result by Deni Will MD (02/20 1019)       No acute intracranial abnormality.                  Workstation performed: [id number]         CT spine cervical without contrast   Final Result by Deni Will MD (02/20 1021)      No cervical spine fracture or traumatic malalignment.                  Workstation performed: [id number]                    Procedures  ECG 12 Lead Documentation Only    Date/Time: 2/20/2024 9:35 AM    Performed by: Cristi Zarco PA-C  Authorized by: Cristi Zarco PA-C    Indications / Diagnosis:  Syncope  ECG reviewed by me, the ED Provider: yes    Patient location:  ED  Previous ECG:     Previous ECG:  Compared to current    Similarity:  No change    Comparison to cardiac monitor: Yes    Interpretation:     Interpretation: non-specific    Rate:     ECG rate:  76    ECG rate assessment: normal    Rhythm:     Rhythm: sinus rhythm    Ectopy:     Ectopy: none    QRS:     QRS axis:  Normal    QRS intervals:  Normal  Conduction:     Conduction: normal    ST segments:     ST segments:  Normal  T waves:     T waves: normal    Other findings:     Other findings comment:  Low voltage QRS  ECG 12 Lead Documentation Only    Date/Time: 2/20/2024 11:40 AM    Performed by: Cristi Zarco PA-C  Authorized by: Cristi Zarco PA-C    Indications / Diagnosis:  Syncope  ECG reviewed by me, the ED Provider: yes    Patient location:  ED  Previous ECG:     Previous ECG:  Compared to current    Similarity:  No change    Comparison to cardiac monitor: Yes    Interpretation:     Interpretation: non-specific    Rate:     ECG rate:  83    ECG rate assessment: normal    Rhythm:     Rhythm: sinus rhythm    Ectopy:     Ectopy: none    QRS:     QRS axis:  Normal    QRS intervals:  Normal  Conduction:     Conduction: normal    ST segments:     ST segments:  Normal  T waves:     T waves: normal    Other findings:     Other findings comment:  Low voltage QRS  ECG 12 Lead Documentation Only    Date/Time: 2/20/2024 1:40 PM    Performed by:  Cristi Zarco PA-C  Authorized by: Cristi Zarco PA-C    Indications / Diagnosis:  Syncope  ECG reviewed by me, the ED Provider: yes    Patient location:  ED  Previous ECG:     Previous ECG:  Compared to current    Similarity:  No change    Comparison to cardiac monitor: Yes    Interpretation:     Interpretation: non-specific    Rate:     ECG rate:  89    ECG rate assessment: normal    Rhythm:     Rhythm: sinus rhythm    Ectopy:     Ectopy: none    QRS:     QRS axis:  Normal    QRS intervals:  Normal  Conduction:     Conduction: normal    ST segments:     ST segments:  Normal  T waves:     T waves: normal    Other findings:     Other findings comment:  Low voltage QRS           ED Course                               SBIRT 22yo+      Flowsheet Row Most Recent Value   Initial Alcohol Screen: US AUDIT-C     1. How often do you have a drink containing alcohol? 0 Filed at: 02/20/2024 0924   2. How many drinks containing alcohol do you have on a typical day you are drinking?  0 Filed at: 02/20/2024 0924   3a. Male UNDER 65: How often do you have five or more drinks on one occasion? 0 Filed at: 02/20/2024 0924   Audit-C Score 0 Filed at: 02/20/2024 0924   LEIDY: How many times in the past year have you...    Used an illegal drug or used a prescription medication for non-medical reasons? Never Filed at: 02/20/2024 0924                      Medical Decision Making  Pt presents after fall, unclear if syncope or LOC due to head strike. Does note preceding lightheadedness. He attributes to medication he took from , although this appears to be NSAID/vitamins. Epistaxis after striking nose, since resolved. EKG shows NSR without acute ischemic changes/ectopy. CT head, c-spine obtained given head strike, no traumatic abnormality. Troponin initially sstill within non-elevated range however given >5 delta ordered. Slight increased on 2 hr troponin, although decreased by 4 hours. EKG's without dynamic changes. XR chest  without acute abnormality on my initial read. CBC reveals mild leukocytosis, nonspecific.  BMP with mild hypokalemia, repleted here.     Pt asymptomatic by time of discharge. Uncertain etiology, given preceding symptoms and absence of palpitations/chest pain, neg EKG's/trops cardiac arrhythmia/ischemia felt unlikely. Possibly side effect from medication but unlikely. Plan for d/c for follow up with PCP. Advised to schedule this follow up as soon as possible. Strict return to ED indications reviewed.     Amount and/or Complexity of Data Reviewed  Labs: ordered.  Radiology: ordered.    Risk  Prescription drug management.             Disposition  Final diagnoses:   Fall, initial encounter   Epistaxis   Hypokalemia     Time reflects when diagnosis was documented in both MDM as applicable and the Disposition within this note       Time User Action Codes Description Comment    2/20/2024  2:13 PM Cristi Zarco [W19.XXXA] Fall, initial encounter     2/20/2024  2:13 PM Cristi Zarco [R04.0] Epistaxis     2/20/2024  2:13 PM Cristi Zarco [E87.6] Hypokalemia           ED Disposition       ED Disposition   Discharge    Condition   Stable    Date/Time   Tue Feb 20, 2024 1412    Comment   Sony Martínez discharge to home/self care.                   Follow-up Information       Follow up With Specialties Details Why Contact Info Additional Information    [facility] Emergency Department Emergency Medicine  If symptoms worsen [address] [facility] [id number] [phone] [facility], [facility] [place], [place], [address]    Lizeth Justin PA-C Physician Assistant, Family Medicine Schedule an appointment as soon as possible for a visit   [address] [place] [id number] [phone]               Discharge Medication List as of 2/20/2024  2:15 PM        CONTINUE these medications which have NOT CHANGED     Details   atorvastatin (LIPITOR) 40 mg tablet Take 1 tablet (40 mg total) by mouth daily, Starting Fri 12/8/2023, Normal      dicyclomine (BENTYL) 20 mg tablet Take 1 tablet (20 mg total) by mouth every 6 (six) hours as needed (indigestion), Starting Thu 12/28/2023, Normal      famotidine (PEPCID) 20 mg tablet Take 1 tablet (20 mg total) by mouth 2 (two) times a day for 10 days, Starting Tue 12/19/2023, Until Fri 12/29/2023, Normal      fluticasone (FLONASE) 50 mcg/act nasal spray 1 spray into each nostril daily, Starting Fri 6/2/2023, Normal      omeprazole (PriLOSEC) 40 MG capsule Take 1 capsule (40 mg total) by mouth daily, Starting Thu 12/28/2023, Normal      !! tadalafil (CIALIS) 20 MG tablet Take one tablet one hour before sex, Normal      !! tadalafil (CIALIS) 5 MG tablet Take 1 tablet (5 mg total) by mouth in the morning, Starting Mon 2/19/2024, Normal      Vitamin D, Cholecalciferol, 50 MCG (2000 UT) CAPS Take 2,000 mg by mouth in the morning, Historical Med       !! - Potential duplicate medications found. Please discuss with provider.          No discharge procedures on file.    PDMP Review         Value Time User    PDMP Reviewed  Yes 1/17/2022 10:02 AM YANELI Flores            ED Provider  Electronically Signed by             Cristi Zarco PA-C  02/20/24 6823

## 2024-02-20 NOTE — DISCHARGE INSTRUCTIONS
Please refer to the attached information for strict return instructions. If symptoms worsen or new symptoms develop please return to the ER.

## 2024-02-22 ENCOUNTER — TELEPHONE (OUTPATIENT)
Dept: FAMILY MEDICINE CLINIC | Facility: CLINIC | Age: 54
End: 2024-02-22

## 2024-06-06 NOTE — CONSULTS
Consultation- Orthopedics   Sony Martínez 53 y o  male MRN: [id number]  Unit/Bed#: ED ENT-01      Chief Complaint:   right finger pain    HPI:   53 y o  right hand dominant male status post R small fingertip injury with right Complete finger amputation  Patient was at work when his glove got caught in the machine he was working in, which pulled his finger into the machine  He immediately noted a fingertip amputation and presented to the ER  This happened approx 2 hrs PTA  Patient brought fingertip that was amputated with him  He denies any pain at this time  He denies other injury  Review Of Systems:   · Skin: Normal  · Neuro: See HPI  · Musculoskeletal: See HPI  · 14 point review of systems negative except as stated above     Past Medical History:   Past Medical History:   Diagnosis Date    High cholesterol        Past Surgical History:   History reviewed  No pertinent surgical history  Family History:  Family history reviewed and non-contributory  Family History   Problem Relation Age of Onset    Hypertension Mother        Social History:  Social History     Social History    Marital status: /Civil Union     Spouse name: N/A    Number of children: N/A    Years of education: N/A     Social History Main Topics    Smoking status: Never Smoker    Smokeless tobacco: None    Alcohol use None    Drug use: Unknown    Sexual activity: Not Asked     Other Topics Concern    None     Social History Narrative    None       Allergies:   No Known Allergies        Labs:    0  Lab Value Date/Time   HCT 47 1 10/29/2018 0636   HGB 15 4 10/29/2018 0636   INR 1 00 10/29/2018 0636   WBC 6 40 10/29/2018 0636       Meds:  No current facility-administered medications for this encounter       Current Outpatient Prescriptions:     oxyCODONE-acetaminophen (PERCOCET) 5-325 mg per tablet, Take 1 tablet by mouth every 4 (four) hours as needed for moderate pain for up to 10 days Max Daily Amount: 6 tablets, Disp: 20 tablet, Rfl: 0    Blood Culture:   No results found for: BLOODCX    Wound Culture:   No results found for: WOUNDCULT    Ins and Outs:  No intake/output data recorded  Physical Exam:   /82   Pulse 100   Temp 98 8 °F (37 1 °C) (Tympanic)   Resp 20   Wt 83 9 kg (185 lb)   SpO2 97%   Gen: Alert and oriented to person, place, time  HEENT: EOMI, eyes clear, moist mucus membranes, hearing intact  Respiratory: Bilateral chest rise  No audible wheezing found  Cardiovascular: Regular Rate and Rhythm  Abdomen: soft nontender/nondistended  Musculoskeletal: right upper extremity  · RSF finger amputated  · TTP over area of injury  · Sensation intact to radial, ulna, median, musculocutaneous, axillary nerve distributions  · Motor intact to  radial, ulna, median, musculocutaneous, axillary nerve distributions  · FDP/FDS/Ext intact    RSF    p3 exposed  +FB present in wound  Flexion of dip intact  Brisk bleeding from wound noted    Avulsed tip: no bone  Within tip +FB noted  Nail plate present on nail matrix        Radiology:   I personally reviewed the films  X-rays right hand shows RSF soft tissue tip avulsion  No fx noted    Procedure: composite graft  Please see procedure note for operative dication    _*_*_*_*_*_*_*_*_*_*_*_*_*_*_*_*_*_*_*_*_*_*_*_*_*_*_*_*_*_*_*_*_*_*_*_*_*_*_*_*_*    Assessment:  53 y o  right hand dominant male with right Complete finger amputation status post bedside washout, composite graft, and splint application      Plan:   · Maintain finger in splint  · Analgesics for pain  · Tetanus updated  · Keflex x 7 days  · F/U OP  · Dispo: Ok for discharge from ortho perspective  · Instructions explained to patient and placed DC instructions in chart    John Shields,  2

## 2024-10-14 DIAGNOSIS — R10.84 GENERALIZED ABDOMINAL PAIN: ICD-10-CM

## 2024-10-14 DIAGNOSIS — N52.9 ERECTILE DYSFUNCTION, UNSPECIFIED ERECTILE DYSFUNCTION TYPE: ICD-10-CM

## 2024-10-14 RX ORDER — TADALAFIL 20 MG/1
TABLET ORAL
Qty: 30 TABLET | Refills: 1 | Status: SHIPPED | OUTPATIENT
Start: 2024-10-14

## 2024-10-14 RX ORDER — TADALAFIL 5 MG/1
5 TABLET ORAL DAILY
Qty: 90 TABLET | Refills: 1 | Status: SHIPPED | OUTPATIENT
Start: 2024-10-14

## 2024-10-15 RX ORDER — OMEPRAZOLE 40 MG/1
40 CAPSULE, DELAYED RELEASE ORAL DAILY
Qty: 90 CAPSULE | Refills: 1 | Status: SHIPPED | OUTPATIENT
Start: 2024-10-15

## 2024-10-22 ENCOUNTER — OFFICE VISIT (OUTPATIENT)
Dept: UROLOGY | Facility: CLINIC | Age: 54
End: 2024-10-22
Payer: COMMERCIAL

## 2024-10-22 VITALS
WEIGHT: 212 LBS | SYSTOLIC BLOOD PRESSURE: 134 MMHG | DIASTOLIC BLOOD PRESSURE: 80 MMHG | BODY MASS INDEX: 36.19 KG/M2 | OXYGEN SATURATION: 98 % | HEART RATE: 75 BPM | HEIGHT: 64 IN

## 2024-10-22 DIAGNOSIS — Z12.5 SCREENING FOR PROSTATE CANCER: Primary | ICD-10-CM

## 2024-10-22 DIAGNOSIS — N52.8 OTHER MALE ERECTILE DYSFUNCTION: ICD-10-CM

## 2024-10-22 PROCEDURE — 99213 OFFICE O/P EST LOW 20 MIN: CPT | Performed by: PHYSICIAN ASSISTANT

## 2024-10-22 NOTE — ASSESSMENT & PLAN NOTE
stable PSA trend, yearly PSA; periodic TRAVIS  Lab Results   Component Value Date    PSA 0.66 12/08/2023    PSA 0.8 12/02/2022    PSA 0.6 10/25/2021     Orders:    PSA, Total Screen; Future

## 2024-10-22 NOTE — ASSESSMENT & PLAN NOTE
continue tadalafil 5mg every other day for maintenance  continue tadalafil 20mg prn one hour prior to sex if needed  very effective, will continue

## 2024-10-22 NOTE — PROGRESS NOTES
"Ambulatory Visit  Name: Sony Martínez      : 1970      MRN: [id number]  Encounter Provider: Meg Dupont PA-C  Encounter Date: 10/22/2024   Encounter department: [facility] UROLOGY [place]    Assessment & Plan  Screening for prostate cancer  stable PSA trend, yearly PSA; periodic TRAVIS  Lab Results   Component Value Date    PSA 0.66 2023    PSA 0.8 2022    PSA 0.6 10/25/2021     Orders:    PSA, Total Screen; Future    Other male erectile dysfunction  continue tadalafil 5mg every other day for maintenance  continue tadalafil 20mg prn one hour prior to sex if needed  very effective, will continue         psa due 2024; then f/u 1 yr    History of Present Illness     Sony Martínez is a 54 y.o. male who presents annual followup prostate cancer screening and ED on daily tadalafil which he reports he actually takes it every other day or every third day he has some congestion at higher doses.  He feels that his very effective for spontaneous erections he does also use a 20 mg on demand dose to improve rigidity.  He is happy with his results.  No voiding bother.  No other concerns.    History obtained from : patient  Review of Systems   Constitutional: Negative.    Respiratory: Negative.     Cardiovascular: Negative.    Genitourinary:  Negative for decreased urine volume, difficulty urinating, dysuria, flank pain, frequency, hematuria and urgency.   Musculoskeletal: Negative.              Objective     /80 (BP Location: Left arm, Patient Position: Sitting, Cuff Size: Adult)   Pulse 75   Ht 5' 4\" (1.626 m)   Wt 96.2 kg (212 lb)   SpO2 98%   BMI 36.39 kg/m²   Physical Exam  Vitals and nursing note reviewed.   Constitutional:       General: He is not in acute distress.     Appearance: He is well-developed. He is not diaphoretic.   HENT:      Head: Normocephalic and atraumatic.   Pulmonary:      Effort: Pulmonary effort is normal.   Musculoskeletal:         General: Normal " range of motion.   Skin:     General: Skin is warm.   Neurological:      Mental Status: He is alert and oriented to person, place, and time.       Results  Lab Results   Component Value Date    PSA 0.66 12/08/2023    PSA 0.8 12/02/2022    PSA 0.6 10/25/2021     Lab Results   Component Value Date    CALCIUM 8.3 (L) 02/20/2024    K 3.3 (L) 02/20/2024    CO2 27 02/20/2024     02/20/2024    BUN 13 02/20/2024    CREATININE 1.17 02/20/2024     Lab Results   Component Value Date    WBC 13.02 (H) 02/20/2024    HGB 15.2 02/20/2024    HCT 45.9 02/20/2024    MCV 88 02/20/2024     02/20/2024       Office Urine Dip  No results found for this or any previous visit (from the past 1 hour(s)).]    Administrative Statements

## 2024-11-21 PROBLEM — Z12.5 SCREENING FOR PROSTATE CANCER: Status: RESOLVED | Noted: 2021-10-25 | Resolved: 2024-11-21

## 2025-08-05 DIAGNOSIS — N52.9 ERECTILE DYSFUNCTION, UNSPECIFIED ERECTILE DYSFUNCTION TYPE: ICD-10-CM

## 2025-08-06 RX ORDER — TADALAFIL 20 MG/1
TABLET ORAL
Qty: 30 TABLET | Refills: 0 | Status: SHIPPED | OUTPATIENT
Start: 2025-08-06

## 2025-08-06 RX ORDER — TADALAFIL 5 MG/1
5 TABLET ORAL DAILY
Qty: 90 TABLET | Refills: 0 | Status: SHIPPED | OUTPATIENT
Start: 2025-08-06

## 2025-08-20 ENCOUNTER — OFFICE VISIT (OUTPATIENT)
Dept: FAMILY MEDICINE CLINIC | Facility: CLINIC | Age: 55
End: 2025-08-20
Payer: COMMERCIAL

## 2025-08-20 VITALS
RESPIRATION RATE: 18 BRPM | HEIGHT: 66 IN | BODY MASS INDEX: 35.1 KG/M2 | OXYGEN SATURATION: 98 % | HEART RATE: 67 BPM | TEMPERATURE: 97 F | WEIGHT: 218.4 LBS | DIASTOLIC BLOOD PRESSURE: 80 MMHG | SYSTOLIC BLOOD PRESSURE: 131 MMHG

## 2025-08-20 DIAGNOSIS — B96.81 HELICOBACTER PYLORI GASTRITIS: ICD-10-CM

## 2025-08-20 DIAGNOSIS — Z13.6 SCREENING FOR CARDIOVASCULAR CONDITION: ICD-10-CM

## 2025-08-20 DIAGNOSIS — R73.01 IMPAIRED FASTING GLUCOSE: ICD-10-CM

## 2025-08-20 DIAGNOSIS — Z00.00 ANNUAL PHYSICAL EXAM: Primary | ICD-10-CM

## 2025-08-20 DIAGNOSIS — E66.812 CLASS 2 SEVERE OBESITY DUE TO EXCESS CALORIES WITH SERIOUS COMORBIDITY AND BODY MASS INDEX (BMI) OF 35.0 TO 35.9 IN ADULT (HCC): ICD-10-CM

## 2025-08-20 DIAGNOSIS — K29.70 HELICOBACTER PYLORI GASTRITIS: ICD-10-CM

## 2025-08-20 DIAGNOSIS — R10.84 GENERALIZED ABDOMINAL PAIN: ICD-10-CM

## 2025-08-20 DIAGNOSIS — E66.01 CLASS 2 SEVERE OBESITY DUE TO EXCESS CALORIES WITH SERIOUS COMORBIDITY AND BODY MASS INDEX (BMI) OF 35.0 TO 35.9 IN ADULT (HCC): ICD-10-CM

## 2025-08-20 DIAGNOSIS — E78.2 MIXED HYPERLIPIDEMIA: ICD-10-CM

## 2025-08-20 PROBLEM — R42 VERTIGO: Status: RESOLVED | Noted: 2022-02-04 | Resolved: 2025-08-20

## 2025-08-20 PROBLEM — G89.29 CHRONIC LEFT SHOULDER PAIN: Status: RESOLVED | Noted: 2022-12-06 | Resolved: 2025-08-20

## 2025-08-20 PROBLEM — M25.512 CHRONIC LEFT SHOULDER PAIN: Status: RESOLVED | Noted: 2022-12-06 | Resolved: 2025-08-20

## 2025-08-20 PROCEDURE — 99396 PREV VISIT EST AGE 40-64: CPT | Performed by: PHYSICIAN ASSISTANT

## 2025-08-20 PROCEDURE — 99214 OFFICE O/P EST MOD 30 MIN: CPT | Performed by: PHYSICIAN ASSISTANT

## 2025-08-20 RX ORDER — BISMUTH SUBSALICYLATE 262 MG/1
524 TABLET, CHEWABLE ORAL
Qty: 112 TABLET | Refills: 0 | Status: SHIPPED | OUTPATIENT
Start: 2025-08-20 | End: 2025-09-03

## 2025-08-20 RX ORDER — TETRACYCLINE HYDROCHLORIDE 500 MG/1
500 CAPSULE ORAL 4 TIMES DAILY
Qty: 56 CAPSULE | Refills: 0 | Status: SHIPPED | OUTPATIENT
Start: 2025-08-20 | End: 2025-09-03

## 2025-08-20 RX ORDER — METRONIDAZOLE 500 MG/1
500 TABLET ORAL 4 TIMES DAILY
Qty: 56 TABLET | Refills: 0 | Status: SHIPPED | OUTPATIENT
Start: 2025-08-20 | End: 2025-09-03

## 2025-08-20 RX ORDER — ROSUVASTATIN CALCIUM 5 MG/1
5 TABLET, COATED ORAL DAILY
Qty: 90 TABLET | Refills: 3 | Status: SHIPPED | OUTPATIENT
Start: 2025-08-20

## 2025-08-20 RX ORDER — OMEPRAZOLE 40 MG/1
40 CAPSULE, DELAYED RELEASE ORAL 2 TIMES DAILY
Qty: 28 CAPSULE | Refills: 0 | Status: SHIPPED | OUTPATIENT
Start: 2025-08-20 | End: 2025-09-03

## 2025-08-22 ENCOUNTER — NURSE TRIAGE (OUTPATIENT)
Age: 55
End: 2025-08-22

## (undated) DEVICE — IV EXTENSION TUBING 33 IN

## (undated) DEVICE — ENDOPATH 5MM CURVED SCISSORS WITH MONOPOLAR CAUTERY: Brand: ENDOPATH

## (undated) DEVICE — ALLENTOWN LAP CHOLE APP PACK: Brand: CARDINAL HEALTH

## (undated) DEVICE — JACKSON-PRATT 100CC BULB RESERVOIR: Brand: CARDINAL HEALTH

## (undated) DEVICE — CHLORAPREP HI-LITE 26ML ORANGE

## (undated) DEVICE — PDS II VLT 0 107CM AG ST3: Brand: ENDOLOOP

## (undated) DEVICE — 3000CC GUARDIAN II: Brand: GUARDIAN

## (undated) DEVICE — [HIGH FLOW INSUFFLATOR,  DO NOT USE IF PACKAGE IS DAMAGED,  KEEP DRY,  KEEP AWAY FROM SUNLIGHT,  PROTECT FROM HEAT AND RADIOACTIVE SOURCES.]: Brand: PNEUMOSURE

## (undated) DEVICE — 5 MM CURVED DISSECTORS WITH MONOPOLAR CAUTERY: Brand: ENDOPATH

## (undated) DEVICE — TROCAR: Brand: KII FIOS FIRST ENTRY

## (undated) DEVICE — IRRIG ENDO FLO TUBING

## (undated) DEVICE — ADHESIVE SKIN HIGH VISCOSITY EXOFIN 1ML

## (undated) DEVICE — SUT MONOCRYL 4-0 PS-2 27 IN Y426H

## (undated) DEVICE — SUT VICRYL 0 UR-6 27 IN J603H

## (undated) DEVICE — GLOVE SRG BIOGEL ECLIPSE 7.5

## (undated) DEVICE — NEEDLE HYPO 22G X 1-1/2 IN

## (undated) DEVICE — SCD SEQUENTIAL COMPRESSION COMFORT SLEEVE MEDIUM KNEE LENGTH: Brand: KENDALL SCD

## (undated) DEVICE — GLOVE INDICATOR PI UNDERGLOVE SZ 8 BLUE

## (undated) DEVICE — SYRINGE 20ML LL

## (undated) DEVICE — TUBING SMOKE EVAC W/FILTRATION DEVICE PLUMEPORT ACTIV

## (undated) DEVICE — TAUT INTRODUCER KIT

## (undated) DEVICE — ELECTRODE LAP J HOOK SPLIT STEM E-Z CLEAN 33CM -0021S

## (undated) DEVICE — PLUMEPEN PRO 10FT

## (undated) DEVICE — TISSUE RETRIEVAL SYSTEM: Brand: INZII RETRIEVAL SYSTEM

## (undated) DEVICE — PMI DISPOSABLE PUNCTURE CLOSURE DEVICE / SUTURE GRASPER: Brand: PMI

## (undated) DEVICE — GLOVE SRG BIOGEL 6.5

## (undated) DEVICE — LIGAMAX 5 MM ENDOSCOPIC MULTIPLE CLIP APPLIER: Brand: LIGAMAX

## (undated) DEVICE — TAUT CATH INTRODUCER 4.5 FR

## (undated) DEVICE — JP CHAN DRN SIL HUBLESS 15FR W/TRO: Brand: CARDINAL HEALTH

## (undated) DEVICE — DRAPE EQUIPMENT RF WAND

## (undated) DEVICE — SYRINGE 30ML LL

## (undated) DEVICE — SUT ETHILON 3-0 PS-1 18 IN 1663G

## (undated) DEVICE — BLUE HEAT SCOPE WARMER

## (undated) DEVICE — STOPCOCK 3-WAY

## (undated) DEVICE — GLOVE INDICATOR PI UNDERGLOVE SZ 6.5 BLUE

## (undated) DEVICE — INTENDED FOR TISSUE SEPARATION, AND OTHER PROCEDURES THAT REQUIRE A SHARP SURGICAL BLADE TO PUNCTURE OR CUT.: Brand: BARD-PARKER SAFETY BLADES SIZE 11, STERILE

## (undated) DEVICE — DRAPE C-ARM X-RAY

## (undated) DEVICE — TROCAR: Brand: KII SLEEVE